# Patient Record
Sex: FEMALE | Race: WHITE | HISPANIC OR LATINO | Employment: UNEMPLOYED | URBAN - METROPOLITAN AREA
[De-identification: names, ages, dates, MRNs, and addresses within clinical notes are randomized per-mention and may not be internally consistent; named-entity substitution may affect disease eponyms.]

---

## 2022-01-16 ENCOUNTER — HOSPITAL ENCOUNTER (EMERGENCY)
Facility: HOSPITAL | Age: 32
Discharge: HOME | End: 2022-01-17
Attending: EMERGENCY MEDICINE
Payer: MEDICAID

## 2022-01-16 DIAGNOSIS — O26.899 ABDOMINAL PAIN AFFECTING PREGNANCY: ICD-10-CM

## 2022-01-16 DIAGNOSIS — R51.9 ACUTE NONINTRACTABLE HEADACHE, UNSPECIFIED HEADACHE TYPE: ICD-10-CM

## 2022-01-16 DIAGNOSIS — R55 SYNCOPE: ICD-10-CM

## 2022-01-16 DIAGNOSIS — R11.0 NAUSEA: ICD-10-CM

## 2022-01-16 DIAGNOSIS — R10.30 LOWER ABDOMINAL PAIN: ICD-10-CM

## 2022-01-16 DIAGNOSIS — R10.9 ABDOMINAL PAIN AFFECTING PREGNANCY: ICD-10-CM

## 2022-01-16 DIAGNOSIS — N30.00 ACUTE CYSTITIS WITHOUT HEMATURIA: Primary | ICD-10-CM

## 2022-01-16 LAB
ALBUMIN SERPL BCP-MCNC: 4.2 G/DL (ref 3.5–5.2)
ALP SERPL-CCNC: 74 U/L (ref 55–135)
ALT SERPL W/O P-5'-P-CCNC: 22 U/L (ref 10–44)
ANION GAP SERPL CALC-SCNC: 12 MMOL/L (ref 8–16)
AST SERPL-CCNC: 21 U/L (ref 10–40)
BACTERIA #/AREA URNS HPF: ABNORMAL /HPF
BASOPHILS # BLD AUTO: 0.06 K/UL (ref 0–0.2)
BASOPHILS NFR BLD: 0.3 % (ref 0–1.9)
BILIRUB SERPL-MCNC: 0.3 MG/DL (ref 0.1–1)
BILIRUB UR QL STRIP: NEGATIVE
BUN SERPL-MCNC: 9 MG/DL (ref 6–20)
CALCIUM SERPL-MCNC: 9.8 MG/DL (ref 8.7–10.5)
CHLORIDE SERPL-SCNC: 105 MMOL/L (ref 95–110)
CLARITY UR: ABNORMAL
CO2 SERPL-SCNC: 19 MMOL/L (ref 23–29)
COLOR UR: YELLOW
CREAT SERPL-MCNC: 0.7 MG/DL (ref 0.5–1.4)
DIFFERENTIAL METHOD: ABNORMAL
EOSINOPHIL # BLD AUTO: 0.1 K/UL (ref 0–0.5)
EOSINOPHIL NFR BLD: 0.7 % (ref 0–8)
ERYTHROCYTE [DISTWIDTH] IN BLOOD BY AUTOMATED COUNT: 14.4 % (ref 11.5–14.5)
EST. GFR  (AFRICAN AMERICAN): >60 ML/MIN/1.73 M^2
EST. GFR  (NON AFRICAN AMERICAN): >60 ML/MIN/1.73 M^2
GLUCOSE SERPL-MCNC: 111 MG/DL (ref 70–110)
GLUCOSE UR QL STRIP: NEGATIVE
HCT VFR BLD AUTO: 35.7 % (ref 37–48.5)
HGB BLD-MCNC: 12.3 G/DL (ref 12–16)
HGB UR QL STRIP: ABNORMAL
HYALINE CASTS #/AREA URNS LPF: 1 /LPF
IMM GRANULOCYTES # BLD AUTO: 0.05 K/UL (ref 0–0.04)
IMM GRANULOCYTES NFR BLD AUTO: 0.3 % (ref 0–0.5)
KETONES UR QL STRIP: NEGATIVE
LEUKOCYTE ESTERASE UR QL STRIP: ABNORMAL
LYMPHOCYTES # BLD AUTO: 2.5 K/UL (ref 1–4.8)
LYMPHOCYTES NFR BLD: 14.2 % (ref 18–48)
MCH RBC QN AUTO: 27 PG (ref 27–31)
MCHC RBC AUTO-ENTMCNC: 34.5 G/DL (ref 32–36)
MCV RBC AUTO: 78 FL (ref 82–98)
MICROSCOPIC COMMENT: ABNORMAL
MONOCYTES # BLD AUTO: 1 K/UL (ref 0.3–1)
MONOCYTES NFR BLD: 5.8 % (ref 4–15)
NEUTROPHILS # BLD AUTO: 13.8 K/UL (ref 1.8–7.7)
NEUTROPHILS NFR BLD: 78.7 % (ref 38–73)
NITRITE UR QL STRIP: NEGATIVE
NRBC BLD-RTO: 0 /100 WBC
PH UR STRIP: 6 [PH] (ref 5–8)
PLATELET # BLD AUTO: 388 K/UL (ref 150–450)
PMV BLD AUTO: 10.9 FL (ref 9.2–12.9)
POTASSIUM SERPL-SCNC: 4.2 MMOL/L (ref 3.5–5.1)
PROT SERPL-MCNC: 7.6 G/DL (ref 6–8.4)
PROT UR QL STRIP: NEGATIVE
RBC # BLD AUTO: 4.56 M/UL (ref 4–5.4)
RBC #/AREA URNS HPF: 24 /HPF (ref 0–4)
SODIUM SERPL-SCNC: 136 MMOL/L (ref 136–145)
SP GR UR STRIP: 1.01 (ref 1–1.03)
SQUAMOUS #/AREA URNS HPF: 20 /HPF
URN SPEC COLLECT METH UR: ABNORMAL
UROBILINOGEN UR STRIP-ACNC: NEGATIVE EU/DL
WBC # BLD AUTO: 17.52 K/UL (ref 3.9–12.7)
WBC #/AREA URNS HPF: 26 /HPF (ref 0–5)

## 2022-01-16 PROCEDURE — 80053 COMPREHEN METABOLIC PANEL: CPT | Performed by: EMERGENCY MEDICINE

## 2022-01-16 PROCEDURE — 96365 THER/PROPH/DIAG IV INF INIT: CPT

## 2022-01-16 PROCEDURE — 86901 BLOOD TYPING SEROLOGIC RH(D): CPT | Performed by: EMERGENCY MEDICINE

## 2022-01-16 PROCEDURE — 85025 COMPLETE CBC W/AUTO DIFF WBC: CPT | Performed by: EMERGENCY MEDICINE

## 2022-01-16 PROCEDURE — 81000 URINALYSIS NONAUTO W/SCOPE: CPT | Performed by: EMERGENCY MEDICINE

## 2022-01-16 PROCEDURE — 99285 EMERGENCY DEPT VISIT HI MDM: CPT | Mod: 25

## 2022-01-16 PROCEDURE — 87186 SC STD MICRODIL/AGAR DIL: CPT | Performed by: EMERGENCY MEDICINE

## 2022-01-16 PROCEDURE — 87077 CULTURE AEROBIC IDENTIFY: CPT | Performed by: EMERGENCY MEDICINE

## 2022-01-16 PROCEDURE — 96361 HYDRATE IV INFUSION ADD-ON: CPT

## 2022-01-16 PROCEDURE — 25000003 PHARM REV CODE 250: Performed by: EMERGENCY MEDICINE

## 2022-01-16 PROCEDURE — 87086 URINE CULTURE/COLONY COUNT: CPT | Performed by: EMERGENCY MEDICINE

## 2022-01-16 PROCEDURE — 63600175 PHARM REV CODE 636 W HCPCS: Performed by: EMERGENCY MEDICINE

## 2022-01-16 PROCEDURE — 84702 CHORIONIC GONADOTROPIN TEST: CPT | Performed by: EMERGENCY MEDICINE

## 2022-01-16 PROCEDURE — 87088 URINE BACTERIA CULTURE: CPT | Performed by: EMERGENCY MEDICINE

## 2022-01-16 RX ORDER — ONDANSETRON 4 MG/1
4 TABLET, ORALLY DISINTEGRATING ORAL EVERY 6 HOURS PRN
Qty: 10 TABLET | Refills: 0 | Status: ON HOLD | OUTPATIENT
Start: 2022-01-16 | End: 2022-09-04 | Stop reason: HOSPADM

## 2022-01-16 RX ORDER — CEPHALEXIN 500 MG/1
500 CAPSULE ORAL EVERY 12 HOURS
Qty: 14 CAPSULE | Refills: 0 | Status: SHIPPED | OUTPATIENT
Start: 2022-01-16 | End: 2022-01-23

## 2022-01-16 RX ADMIN — CEFTRIAXONE SODIUM 1 G: 1 INJECTION, POWDER, FOR SOLUTION INTRAMUSCULAR; INTRAVENOUS at 11:01

## 2022-01-16 RX ADMIN — SODIUM CHLORIDE 1000 ML: 0.9 INJECTION, SOLUTION INTRAVENOUS at 10:01

## 2022-01-17 VITALS
OXYGEN SATURATION: 100 % | HEART RATE: 77 BPM | SYSTOLIC BLOOD PRESSURE: 108 MMHG | TEMPERATURE: 99 F | DIASTOLIC BLOOD PRESSURE: 54 MMHG | WEIGHT: 200 LBS | RESPIRATION RATE: 22 BRPM

## 2022-01-17 LAB
ABO + RH BLD: NORMAL
HCG INTACT+B SERPL-ACNC: NORMAL MIU/ML

## 2022-01-17 NOTE — DISCHARGE INSTRUCTIONS
Asegúrese de beber muchos líquidos, coco Gatorade G2 o Powerade light. Recomiendo christiana Tylenol 650 mg cada 6 a 8 horas según sea necesario para el dolor de fang o el dolor. Asegúrese de christiana shabbir vitaminas prenatales todos los días. Solicite didier eldon de seguimiento con un obstetra y ginecólogo para didier evaluación y manejo adicionales. Regrese con cualquier síntoma nuevo o que empeore.

## 2022-01-17 NOTE — ED TRIAGE NOTES
used for assessment. Patient arrived to ED with complaints of dizziness and abdominal cramping and low back pain after a syncopal episode. + LOC, denies HH. States room is spinning. States she was upset about something, was sitting on a couch when she became dizzy and blacked out. LMP 21. . Awake, alert, oriented x 4, Denies CP and SOB. No increased WOB. Ambulatory with steady gait.  Family at bedside.

## 2022-01-17 NOTE — ED PROVIDER NOTES
Encounter Date: 2022       History     Chief Complaint   Patient presents with    Loss of Consciousness     Pt arrives via  EMS with c/o a syncopal episode witnessed by family, pt struck her head on wall, no injury noted. Pt has hx of diabetes, EMS reports . Pt is awake, alert at this time.      31-year-old female  at approximately 8 weeks gestational age by dates brought to the emergency department by EMS after a syncopal episode.  Patient states she had gotten upset and then stood up abruptly and felt very lightheaded, with brief loss of consciousness and a fall.  Denies striking her head.  On arrival states she no longer feels lightheaded.  Notes for the past few weeks she has had some nausea without emesis, particularly the morning, and has been having headaches.  Denies any dysuria, frequency, urgency, vaginal bleeding or discharge.  States she has has been having some cramping lower abdominal and low back pain last few days intermittently, lasting minutes at a time without eliciting, exacerbating, or alleviating factors.  States she missed an OB appointment last week for this pregnancy.  Describes generalized, throbbing headache that is worst in the morning, with some improvement with over-the-counter medications.  No other symptoms reported.  Denies any focal numbness or weakness, sore throat, cough, congestion, chest pain, shortness of breath.         Review of patient's allergies indicates:  No Known Allergies  History reviewed. No pertinent past medical history.  No past surgical history on file.  History reviewed. No pertinent family history.     Review of Systems   Constitutional: Negative for chills, fatigue and fever.   HENT: Negative for congestion and sore throat.    Eyes: Negative for photophobia and visual disturbance.   Respiratory: Negative for cough and shortness of breath.    Cardiovascular: Negative for chest pain and palpitations.   Gastrointestinal: Positive for abdominal  pain and nausea. Negative for vomiting.   Musculoskeletal: Positive for back pain. Negative for neck pain and neck stiffness.   Neurological: Positive for syncope, light-headedness and headaches. Negative for numbness.       Physical Exam     Initial Vitals [01/16/22 2138]   BP Pulse Resp Temp SpO2   126/75 78 19 98.7 °F (37.1 °C) 99 %      MAP       --         Physical Exam    Nursing note and vitals reviewed.  Constitutional: She appears well-developed and well-nourished. No distress.   HENT:   Head: Normocephalic and atraumatic.   Eyes: Conjunctivae and EOM are normal. Pupils are equal, round, and reactive to light.   Neck: Neck supple. No tracheal deviation present.   Normal range of motion.  Cardiovascular: Normal rate and intact distal pulses.   Pulmonary/Chest: No respiratory distress.   Abdominal: Abdomen is soft. She exhibits no distension. There is no abdominal tenderness.   Musculoskeletal:         General: No tenderness or edema. Normal range of motion.      Cervical back: Normal range of motion and neck supple.     Neurological: She is alert and oriented to person, place, and time. She has normal strength. No cranial nerve deficit. GCS score is 15. GCS eye subscore is 4. GCS verbal subscore is 5. GCS motor subscore is 6.   Skin: Skin is warm and dry.         ED Course   Procedures  Labs Reviewed   CBC W/ AUTO DIFFERENTIAL - Abnormal; Notable for the following components:       Result Value    WBC 17.52 (*)     Hematocrit 35.7 (*)     MCV 78 (*)     Gran # (ANC) 13.8 (*)     Immature Grans (Abs) 0.05 (*)     Gran % 78.7 (*)     Lymph % 14.2 (*)     All other components within normal limits    Narrative:     Release to patient->Immediate   COMPREHENSIVE METABOLIC PANEL - Abnormal; Notable for the following components:    CO2 19 (*)     Glucose 111 (*)     All other components within normal limits    Narrative:     Release to patient->Immediate   URINALYSIS - Abnormal; Notable for the following components:     Appearance, UA Hazy (*)     Occult Blood UA Trace (*)     Leukocytes, UA 3+ (*)     All other components within normal limits   URINALYSIS MICROSCOPIC - Abnormal; Notable for the following components:    RBC, UA 24 (*)     WBC, UA 26 (*)     Bacteria Many (*)     All other components within normal limits   CULTURE, URINE   CULTURE, URINE   HCG, QUANTITATIVE    Narrative:     Release to patient->Immediate   GROUP & RH     EKG Readings: (Independently Interpreted)   Initial Reading: No STEMI. Previous EKG Date: No prior available for comparison. Rhythm: Normal Sinus Rhythm. Heart Rate: 77. Ectopy: No Ectopy. Conduction: Normal. ST Segments: Normal ST Segments. T Waves: Normal. Axis: Normal.         X-Rays:   Independently Interpreted Readings:   Other Readings:  Imaging interpreted by radiologist and visualized by me:    Imaging Results          US OB <14 Wks TransAbd & TransVag, Single Gestation (XPD) (Final result)  Result time 01/16/22 23:40:54   Procedure changed from US OB <14 Wks, TransAbd, Single Gestation     Final result by Tim Calles MD (01/16/22 23:40:54)                 Impression:      Single live intrauterine pregnancy with estimated gestational age 6 weeks 2 days. and an AICHA of 09/09/2022 (by [facility]).      Electronically signed by: Tim Calles  Date:    01/16/2022  Time:    23:40             Narrative:    EXAMINATION:  US OB <14 WEEKS, TRANSABDOM & TRANSVAG, SINGLE GESTATION (XPD)    CLINICAL HISTORY:  abd pain; Other specified pregnancy related conditions, unspecified trimester    TECHNIQUE:  Transabdominal sonography of the pelvis was performed, followed by transvaginal sonography to better evaluate the uterus and ovaries.    COMPARISON:  None.    FINDINGS:  Uterus: Measures 10.7 x 5.8 x 6.2 cm    Intrauterine gestation(s): Single    Mean gestational sac diameter: 1.5 cm    Yolk sac: Identified, measuring 0.1 cm    Crown-rump length (CRL): 0.46 cm    Cardiac activity: 167 bpm.  Note that  evaluation of fetal heart rate is limited due to small size of the fetal pole and patient respiration.    Subchorionic hemorrhage: None.    Right ovary: Measures 3.3 x 1.2 x 3.1 cm.  Anechoic area measuring 1.4 x 0.8 x 1.4 cm compatible with cyst.    Left ovary: Measures 3.5 x 3 x 2 cm and is unremarkable in appearance.    Miscellaneous: No Free Fluid.                                Medications   cefTRIAXone (ROCEPHIN) 1 g/50 mL D5W IVPB (1 g Intravenous New Bag 22 6440)   sodium chloride 0.9% bolus 1,000 mL (1,000 mLs Intravenous New Bag 22 6356)     Medical Decision Making:   Initial Assessment:   31-year-old female  brought to the emergency department by EMS after a syncopal episode also complaining of headache and abdominal pain and nausea  Differential Diagnosis:   Dehydration, electrolyte dyscrasias, arrhythmia, anemia, vasovagal, migraine versus tension versus cluster versus sinus headache, diverticulitis, cholecystitis, pancreatitis, appendicitis, obstruction, constipation UTI, pyelonephritis, kidney stone, gastroenteritis, pregnancy, threatened versus inevitable versus missed miscarriage, ectopic  Independently Interpreted Test(s):   I have ordered and independently interpreted X-rays - see prior notes.  I have ordered and independently interpreted EKG Reading(s) - see prior notes  Clinical Tests:   Lab Tests: Reviewed       <> Summary of Lab: Concerning for UTI  ED Management:  Patient given IV fluid, Rocephin.  On re-evaluation, vital signs remained stable, patient reports improvement in symptoms, denies any headache or nausea at this time.  Denies any abdominal pain at this time.  Informed her of results as well as plan to discharge with prescriptions for Zofran and Keflex, referral sent for OBGYN follow-up, instructed on home management, instructed to make sure she is taking her prenatal vitamins daily, given strict return precautions.  Patient and family expressed good understanding and  are comfortable with discharge at this time.                       Clinical Impression:   Final diagnoses:  [R55] Syncope  [O26.899, R10.9] Abdominal pain affecting pregnancy  [N30.00] Acute cystitis without hematuria (Primary)  [R10.30] Lower abdominal pain  [R11.0] Nausea  [R51.9] Acute nonintractable headache, unspecified headache type          ED Disposition Condition    Discharge Stable        ED Prescriptions     Medication Sig Dispense Start Date End Date Auth. Provider    ondansetron (ZOFRAN-ODT) 4 MG TbDL Take 1 tablet (4 mg total) by mouth every 6 (six) hours as needed (Nausea). 10 tablet 1/16/2022  Leobardo Byrd MD    cephALEXin (KEFLEX) 500 MG capsule Take 1 capsule (500 mg total) by mouth every 12 (twelve) hours. for 7 days 14 capsule 1/16/2022 1/23/2022 Leobardo Byrd MD        Follow-up Information     Follow up With Specialties Details Why Contact Info    Barber Moya MD Obstetrics and Gynecology Schedule an appointment as soon as possible for a visit   [address] [facility]  [address] [place] [id number]  [phone]             Leobardo Byrd MD  01/17/22 0006

## 2022-01-18 ENCOUNTER — TELEPHONE (OUTPATIENT)
Dept: OBSTETRICS AND GYNECOLOGY | Facility: CLINIC | Age: 32
End: 2022-01-18
Payer: MEDICAID

## 2022-01-18 NOTE — TELEPHONE ENCOUNTER
----- Message from Bibi Alcala sent at 1/18/2022  2:00 PM CST -----  Contact: Erika [phone]  Type:  Sooner Appointment Request    Caller is requesting a sooner appointment.      Name of Caller: Erika   When is the first available appointment? 3/16  Symptoms: Abdominal pain affecting pregnancy [O26.899, R10.9]  Would the patient rather a call back or a response via [facility]?  Call back   Best Call Back Number: [phone]  Additional Information:  pt has referral marked urgent     Khmer speaking patient

## 2022-01-19 LAB — BACTERIA UR CULT: ABNORMAL

## 2022-02-07 ENCOUNTER — HOSPITAL ENCOUNTER (EMERGENCY)
Facility: HOSPITAL | Age: 32
Discharge: HOME | End: 2022-02-07
Attending: EMERGENCY MEDICINE
Payer: MEDICAID

## 2022-02-07 VITALS
HEIGHT: 63 IN | OXYGEN SATURATION: 98 % | TEMPERATURE: 98 F | SYSTOLIC BLOOD PRESSURE: 117 MMHG | HEART RATE: 83 BPM | DIASTOLIC BLOOD PRESSURE: 62 MMHG | BODY MASS INDEX: 35.43 KG/M2 | RESPIRATION RATE: 16 BRPM

## 2022-02-07 DIAGNOSIS — O20.0 THREATENED ABORTION: Primary | ICD-10-CM

## 2022-02-07 DIAGNOSIS — N93.9 VAGINAL BLEEDING: ICD-10-CM

## 2022-02-07 DIAGNOSIS — N30.90 CYSTITIS: ICD-10-CM

## 2022-02-07 LAB
ABO + RH BLD: NORMAL
ALBUMIN SERPL BCP-MCNC: 4.1 G/DL (ref 3.5–5.2)
ALP SERPL-CCNC: 70 U/L (ref 55–135)
ALT SERPL W/O P-5'-P-CCNC: 26 U/L (ref 10–44)
ANION GAP SERPL CALC-SCNC: 11 MMOL/L (ref 8–16)
AST SERPL-CCNC: 17 U/L (ref 10–40)
B-HCG UR QL: POSITIVE
BACTERIA #/AREA URNS HPF: ABNORMAL /HPF
BASOPHILS # BLD AUTO: 0.06 K/UL (ref 0–0.2)
BASOPHILS NFR BLD: 0.4 % (ref 0–1.9)
BILIRUB SERPL-MCNC: 0.3 MG/DL (ref 0.1–1)
BILIRUB UR QL STRIP: NEGATIVE
BUN SERPL-MCNC: 8 MG/DL (ref 6–20)
CALCIUM SERPL-MCNC: 9.5 MG/DL (ref 8.7–10.5)
CHLORIDE SERPL-SCNC: 104 MMOL/L (ref 95–110)
CLARITY UR: ABNORMAL
CO2 SERPL-SCNC: 19 MMOL/L (ref 23–29)
COLOR UR: YELLOW
CREAT SERPL-MCNC: 0.8 MG/DL (ref 0.5–1.4)
CTP QC/QA: YES
DIFFERENTIAL METHOD: ABNORMAL
EOSINOPHIL # BLD AUTO: 0.1 K/UL (ref 0–0.5)
EOSINOPHIL NFR BLD: 0.7 % (ref 0–8)
ERYTHROCYTE [DISTWIDTH] IN BLOOD BY AUTOMATED COUNT: 14 % (ref 11.5–14.5)
EST. GFR  (AFRICAN AMERICAN): >60 ML/MIN/1.73 M^2
EST. GFR  (NON AFRICAN AMERICAN): >60 ML/MIN/1.73 M^2
GLUCOSE SERPL-MCNC: 97 MG/DL (ref 70–110)
GLUCOSE UR QL STRIP: NEGATIVE
HCG INTACT+B SERPL-ACNC: NORMAL MIU/ML
HCT VFR BLD AUTO: 37.4 % (ref 37–48.5)
HGB BLD-MCNC: 13.1 G/DL (ref 12–16)
HGB UR QL STRIP: ABNORMAL
IMM GRANULOCYTES # BLD AUTO: 0.04 K/UL (ref 0–0.04)
IMM GRANULOCYTES NFR BLD AUTO: 0.2 % (ref 0–0.5)
KETONES UR QL STRIP: NEGATIVE
LEUKOCYTE ESTERASE UR QL STRIP: ABNORMAL
LYMPHOCYTES # BLD AUTO: 2.9 K/UL (ref 1–4.8)
LYMPHOCYTES NFR BLD: 17.7 % (ref 18–48)
MCH RBC QN AUTO: 27.3 PG (ref 27–31)
MCHC RBC AUTO-ENTMCNC: 35 G/DL (ref 32–36)
MCV RBC AUTO: 78 FL (ref 82–98)
MICROSCOPIC COMMENT: ABNORMAL
MONOCYTES # BLD AUTO: 0.9 K/UL (ref 0.3–1)
MONOCYTES NFR BLD: 5.4 % (ref 4–15)
NEUTROPHILS # BLD AUTO: 12.4 K/UL (ref 1.8–7.7)
NEUTROPHILS NFR BLD: 75.6 % (ref 38–73)
NITRITE UR QL STRIP: NEGATIVE
NRBC BLD-RTO: 0 /100 WBC
PH UR STRIP: 7 [PH] (ref 5–8)
PLATELET # BLD AUTO: 374 K/UL (ref 150–450)
PMV BLD AUTO: 10.3 FL (ref 9.2–12.9)
POTASSIUM SERPL-SCNC: 3.6 MMOL/L (ref 3.5–5.1)
PROT SERPL-MCNC: 7.7 G/DL (ref 6–8.4)
PROT UR QL STRIP: NEGATIVE
RBC # BLD AUTO: 4.79 M/UL (ref 4–5.4)
RBC #/AREA URNS HPF: 3 /HPF (ref 0–4)
SODIUM SERPL-SCNC: 134 MMOL/L (ref 136–145)
SP GR UR STRIP: 1.01 (ref 1–1.03)
SQUAMOUS #/AREA URNS HPF: 19 /HPF
URN SPEC COLLECT METH UR: ABNORMAL
UROBILINOGEN UR STRIP-ACNC: NEGATIVE EU/DL
WBC # BLD AUTO: 16.41 K/UL (ref 3.9–12.7)
WBC #/AREA URNS HPF: 9 /HPF (ref 0–5)

## 2022-02-07 PROCEDURE — 80053 COMPREHEN METABOLIC PANEL: CPT | Performed by: NURSE PRACTITIONER

## 2022-02-07 PROCEDURE — 85025 COMPLETE CBC W/AUTO DIFF WBC: CPT | Performed by: NURSE PRACTITIONER

## 2022-02-07 PROCEDURE — 86901 BLOOD TYPING SEROLOGIC RH(D): CPT | Performed by: NURSE PRACTITIONER

## 2022-02-07 PROCEDURE — 81025 URINE PREGNANCY TEST: CPT | Performed by: NURSE PRACTITIONER

## 2022-02-07 PROCEDURE — 84702 CHORIONIC GONADOTROPIN TEST: CPT | Performed by: NURSE PRACTITIONER

## 2022-02-07 PROCEDURE — 99284 EMERGENCY DEPT VISIT MOD MDM: CPT | Mod: 25

## 2022-02-07 PROCEDURE — 81000 URINALYSIS NONAUTO W/SCOPE: CPT | Performed by: NURSE PRACTITIONER

## 2022-02-07 PROCEDURE — 25000003 PHARM REV CODE 250: Performed by: EMERGENCY MEDICINE

## 2022-02-07 RX ORDER — ONDANSETRON 4 MG/1
4 TABLET, ORALLY DISINTEGRATING ORAL EVERY 8 HOURS PRN
Qty: 12 TABLET | Refills: 0 | Status: ON HOLD | OUTPATIENT
Start: 2022-02-07 | End: 2022-09-04 | Stop reason: HOSPADM

## 2022-02-07 RX ORDER — CEPHALEXIN 250 MG/1
500 CAPSULE ORAL EVERY 12 HOURS
Qty: 28 CAPSULE | Refills: 0 | Status: SHIPPED | OUTPATIENT
Start: 2022-02-07 | End: 2022-02-14

## 2022-02-07 RX ORDER — ACETAMINOPHEN 500 MG
500 TABLET ORAL
Status: COMPLETED | OUTPATIENT
Start: 2022-02-07 | End: 2022-02-07

## 2022-02-07 RX ADMIN — ACETAMINOPHEN 500 MG: 500 TABLET ORAL at 09:02

## 2022-02-08 NOTE — ED PROVIDER NOTES
Encounter Date: 2022       History     Chief Complaint   Patient presents with    Vaginal Bleeding     Pt states she is 9 weeks  6 days mths pregnant, last menstrual cycle was now 30th, pt complains of lower abd pain and light vaginal bleeding, G2      Erika Vasquez is a 31 y.o. female who  has no past medical history on file.    The patient presents to the ED due to vaginal bleeding onset this afternoon.  Patient is 9 weeks 6 days pregnant.  She is   2 para 1. She is pending follow-up with her OBGYN in 2 days Dr. Moya.  She states that with her vaginal bleeding today she had some cramping lower abdominal pain.  She reported light vaginal bleeding.  After her ultrasound she noticed a little bit more bleeding however is not soaking through pads.  She has no other symptoms today including shortness of breath, dishcarge, fever fatigue no vomiting today but she does report nausea.  She also reports some dysuria.  She was seen almost a month ago and was found to have UTI that was pansensitive on urine culture.  No other concerns.    The history is provided by the patient ( offered at no cost and declined.).     Review of patient's allergies indicates:  No Known Allergies  No past medical history on file.  No past surgical history on file.  No family history on file.     Review of Systems   Constitutional: Negative for chills and fever.   HENT: Negative for sore throat.    Respiratory: Negative for cough and shortness of breath.    Cardiovascular: Negative for chest pain.   Gastrointestinal: Positive for nausea. Negative for vomiting.   Genitourinary: Positive for dysuria, pelvic pain and vaginal bleeding. Negative for frequency, urgency and vaginal discharge.   Musculoskeletal: Negative for back pain, neck pain and neck stiffness.   Skin: Negative for rash and wound.   Neurological: Negative for syncope and weakness.   Hematological: Does not bruise/bleed easily.    Psychiatric/Behavioral: Negative for agitation, behavioral problems and confusion.       Physical Exam     Initial Vitals [02/07/22 1656]   BP Pulse Resp Temp SpO2   133/70 89 20 98.5 °F (36.9 °C) 100 %      MAP       --         Physical Exam    Nursing note and vitals reviewed.  Constitutional: She appears well-developed and well-nourished. She is not diaphoretic. No distress.   HENT:   Head: Normocephalic and atraumatic.   Mouth/Throat: Oropharynx is clear and moist.   Eyes: EOM are normal. Pupils are equal, round, and reactive to light.   Neck: No tracheal deviation present.   Cardiovascular: Normal rate, regular rhythm, normal heart sounds and intact distal pulses.   Pulmonary/Chest: Breath sounds normal. No stridor. No respiratory distress. She has no wheezes.   Abdominal: Abdomen is soft. Bowel sounds are normal. She exhibits no distension and no mass. There is no abdominal tenderness.   Genitourinary:    Genitourinary Comments: Patient declined pelvic exam     Musculoskeletal:         General: No edema. Normal range of motion.     Neurological: She is alert and oriented to person, place, and time. No cranial nerve deficit or sensory deficit.   Skin: Skin is warm and dry. Capillary refill takes less than 2 seconds. No rash noted.   Psychiatric: She has a normal mood and affect. Her behavior is normal. Thought content normal.         ED Course   Procedures  Labs Reviewed   CBC W/ AUTO DIFFERENTIAL - Abnormal; Notable for the following components:       Result Value    WBC 16.41 (*)     MCV 78 (*)     Gran # (ANC) 12.4 (*)     Gran % 75.6 (*)     Lymph % 17.7 (*)     All other components within normal limits   COMPREHENSIVE METABOLIC PANEL - Abnormal; Notable for the following components:    Sodium 134 (*)     CO2 19 (*)     All other components within normal limits   URINALYSIS, REFLEX TO URINE CULTURE - Abnormal; Notable for the following components:    Appearance, UA Hazy (*)     Occult Blood UA 3+ (*)      Leukocytes, UA 2+ (*)     All other components within normal limits    Narrative:     Specimen Source->Urine   URINALYSIS MICROSCOPIC - Abnormal; Notable for the following components:    WBC, UA 9 (*)     All other components within normal limits    Narrative:     Specimen Source->Urine   POCT URINE PREGNANCY - Abnormal; Notable for the following components:    POC Preg Test, Ur Positive (*)     All other components within normal limits   HCG, QUANTITATIVE   HCG, QUANTITATIVE   GROUP & RH          Imaging Results          US OB <14 Wks TransAbd & TransVag, Single Gestation (XPD) (Final result)  Result time 02/07/22 19:43:10   Procedure changed from US OB <14 Wks, TransAbd, Single Gestation     Final result by Aris Cortes MD (02/07/22 19:43:10)                 Impression:      Single living intrauterine gestation, crown-rump length correlates with an estimated gestational age of 9 weeks 3 days, cardiac activity documented at 179 beats per minute.  There is a small amount of fluid in the cervical canal, correlation is advised.      Electronically signed by: Aris Cortes MD  Date:    02/07/2022  Time:    19:43             Narrative:    EXAMINATION:  US OB <14 WEEKS, TRANSABDOM & TRANSVAG, SINGLE GESTATION (XPD)    CLINICAL HISTORY:  VB; Abnormal uterine and vaginal bleeding, unspecified    TECHNIQUE:  Transabdominal sonography of the pelvis was performed, followed by transvaginal sonography to better evaluate the uterus and ovaries.    COMPARISON:  01/16/2022    FINDINGS:  There is a single living intrauterine gestation, crown-rump length correlates with an estimated gestational age of 9 weeks 3 days, cardiac activity documented at 179 beats per minute.  There are cervical nabothian cysts and a mild amount of fluid in the cervical canal.    The ovaries are not identified.  No significant free fluid in the pelvis.                                 Medications   acetaminophen tablet 500 mg (500 mg Oral Given  2/7/22 2129)     Medical Decision Making:   Initial Assessment:   31-year-old female presenting with vaginal bleeding.  Vital signs are stable.  Will plan to obtain labs ultrasound and will reassess.  Differential Diagnosis:   Differential Diagnosis includes, but is not limited to:  Pregnancy complication (threatened AB, inevitable AB, incomplete Ab, missed AB, ectopic pregnancy, placenta previa) normal menses, STD, foreign body, trauma.    ED Management:  Labs with leukocytosis,  UA consistent with cystitis, will treat with antibiotics.  TVUS shows IUP with fetal heart tones, fluid in cervical canal - patient declined pelvic exam, she has follow up with her OB in 2 days  Requested refill of zofran  NAD on reassessment. She is stable for discharge and ready to go home.   Discussed return precautions for worsening bleeding, pain, new symptoms such as fever or any other concerns.    After taking into careful account the historical factors and physical exam findings of the patient's presentation today, in conjunction with the empirical and objective data obtained on ED workup, no acute emergent medical condition has been identified. The patient appears to be low risk for an emergent medical condition and I feel it is safe and appropriate at this time for the patient to be discharged to follow-up as detailed in their discharge instructions for reevaluation and possible continued outpatient workup and management. I have discussed the specifics of the workup with the patient and the patient has verbalized understanding of the details of the workup, the diagnosis, the treatment plan, and the need for outpatient follow-up.  Although the patient has no emergent etiology today this does not preclude the development of an emergent condition so in addition, I have advised the patient that they can return to the ED and/or activate EMS at any time with worsening of their symptoms, change of their symptoms, or with any other  medical complaint.  The patient remained comfortable and stable during their visit in the ED.  Discharge and follow-up instructions discussed with the patient who expressed understanding and willingness to comply with my recommendations.                        Clinical Impression:   Final diagnoses:  [N93.9] Vaginal bleeding  [O20.0] Threatened  (Primary)  [N30.90] Cystitis          ED Disposition Condition    Discharge Stable        ED Prescriptions     Medication Sig Dispense Start Date End Date Auth. Provider    cephALEXin (KEFLEX) 250 MG capsule Take 2 capsules (500 mg total) by mouth every 12 (twelve) hours. for 7 days 28 capsule 2022 Bari Toussaint Jr., MD    ondansetron (ZOFRAN-ODT) 4 MG TbDL Take 1 tablet (4 mg total) by mouth every 8 (eight) hours as needed (nausea). 12 tablet 2022  Bari Toussaint Jr., MD        Follow-up Information     Follow up With Specialties Details Why Contact Info    Barber Moya MD Obstetrics and Gynecology In 2 days  [address] [facility]  [address] [place] [id number] [phone]            Portions of this note were dictated using voice recognition software and may contain dictation related errors in spelling/grammar/syntax not found on text review       Bari Toussaint Jr., MD  22 [id number]

## 2022-02-10 ENCOUNTER — LAB VISIT (OUTPATIENT)
Dept: LAB | Facility: HOSPITAL | Age: 32
End: 2022-02-10
Attending: OBSTETRICS & GYNECOLOGY
Payer: MEDICAID

## 2022-02-10 ENCOUNTER — OFFICE VISIT (OUTPATIENT)
Dept: OBSTETRICS AND GYNECOLOGY | Facility: CLINIC | Age: 32
End: 2022-02-10
Payer: MEDICAID

## 2022-02-10 VITALS
WEIGHT: 228.81 LBS | SYSTOLIC BLOOD PRESSURE: 122 MMHG | BODY MASS INDEX: 40.54 KG/M2 | DIASTOLIC BLOOD PRESSURE: 70 MMHG

## 2022-02-10 DIAGNOSIS — O09.299 HX OF PREECLAMPSIA, PRIOR PREGNANCY, CURRENTLY PREGNANT: ICD-10-CM

## 2022-02-10 DIAGNOSIS — O26.899 ABDOMINAL PAIN AFFECTING PREGNANCY: ICD-10-CM

## 2022-02-10 DIAGNOSIS — Z98.891 PREVIOUS CESAREAN SECTION: ICD-10-CM

## 2022-02-10 DIAGNOSIS — N91.2 AMENORRHEA: Primary | ICD-10-CM

## 2022-02-10 DIAGNOSIS — R10.9 ABDOMINAL PAIN AFFECTING PREGNANCY: ICD-10-CM

## 2022-02-10 DIAGNOSIS — N91.2 AMENORRHEA: ICD-10-CM

## 2022-02-10 LAB
ABO + RH BLD: NORMAL
ANION GAP SERPL CALC-SCNC: 10 MMOL/L (ref 8–16)
BASOPHILS # BLD AUTO: 0.05 K/UL (ref 0–0.2)
BASOPHILS NFR BLD: 0.4 % (ref 0–1.9)
BLD GP AB SCN CELLS X3 SERPL QL: NORMAL
BUN SERPL-MCNC: 6 MG/DL (ref 6–20)
CALCIUM SERPL-MCNC: 9.5 MG/DL (ref 8.7–10.5)
CHLORIDE SERPL-SCNC: 104 MMOL/L (ref 95–110)
CO2 SERPL-SCNC: 21 MMOL/L (ref 23–29)
CREAT SERPL-MCNC: 0.7 MG/DL (ref 0.5–1.4)
DIFFERENTIAL METHOD: ABNORMAL
EOSINOPHIL # BLD AUTO: 0.1 K/UL (ref 0–0.5)
EOSINOPHIL NFR BLD: 1.1 % (ref 0–8)
ERYTHROCYTE [DISTWIDTH] IN BLOOD BY AUTOMATED COUNT: 13.8 % (ref 11.5–14.5)
EST. GFR  (AFRICAN AMERICAN): >60 ML/MIN/1.73 M^2
EST. GFR  (NON AFRICAN AMERICAN): >60 ML/MIN/1.73 M^2
ESTIMATED AVG GLUCOSE: 108 MG/DL (ref 68–131)
GLUCOSE SERPL-MCNC: 100 MG/DL (ref 70–110)
HBA1C MFR BLD: 5.4 % (ref 4–5.6)
HCT VFR BLD AUTO: 37.1 % (ref 37–48.5)
HGB BLD-MCNC: 12.8 G/DL (ref 12–16)
IMM GRANULOCYTES # BLD AUTO: 0.06 K/UL (ref 0–0.04)
IMM GRANULOCYTES NFR BLD AUTO: 0.5 % (ref 0–0.5)
LYMPHOCYTES # BLD AUTO: 2.5 K/UL (ref 1–4.8)
LYMPHOCYTES NFR BLD: 20.2 % (ref 18–48)
MCH RBC QN AUTO: 27.1 PG (ref 27–31)
MCHC RBC AUTO-ENTMCNC: 34.5 G/DL (ref 32–36)
MCV RBC AUTO: 79 FL (ref 82–98)
MONOCYTES # BLD AUTO: 0.7 K/UL (ref 0.3–1)
MONOCYTES NFR BLD: 5.3 % (ref 4–15)
NEUTROPHILS # BLD AUTO: 8.9 K/UL (ref 1.8–7.7)
NEUTROPHILS NFR BLD: 72.5 % (ref 38–73)
NRBC BLD-RTO: 0 /100 WBC
PLATELET # BLD AUTO: 391 K/UL (ref 150–450)
PMV BLD AUTO: 10.6 FL (ref 9.2–12.9)
POTASSIUM SERPL-SCNC: 3.6 MMOL/L (ref 3.5–5.1)
RBC # BLD AUTO: 4.72 M/UL (ref 4–5.4)
SODIUM SERPL-SCNC: 135 MMOL/L (ref 136–145)
WBC # BLD AUTO: 12.31 K/UL (ref 3.9–12.7)

## 2022-02-10 PROCEDURE — 87086 URINE CULTURE/COLONY COUNT: CPT | Performed by: OBSTETRICS & GYNECOLOGY

## 2022-02-10 PROCEDURE — 87624 HPV HI-RISK TYP POOLED RSLT: CPT | Mod: 91 | Performed by: OBSTETRICS & GYNECOLOGY

## 2022-02-10 PROCEDURE — 99204 PR OFFICE/OUTPT VISIT, NEW, LEVL IV, 45-59 MIN: ICD-10-PCS | Mod: S$PBB,TH,, | Performed by: OBSTETRICS & GYNECOLOGY

## 2022-02-10 PROCEDURE — 87491 CHLMYD TRACH DNA AMP PROBE: CPT | Mod: 59 | Performed by: OBSTETRICS & GYNECOLOGY

## 2022-02-10 PROCEDURE — 36415 COLL VENOUS BLD VENIPUNCTURE: CPT | Performed by: OBSTETRICS & GYNECOLOGY

## 2022-02-10 PROCEDURE — 3074F PR MOST RECENT SYSTOLIC BLOOD PRESSURE < 130 MM HG: ICD-10-PCS | Mod: CPTII,,, | Performed by: OBSTETRICS & GYNECOLOGY

## 2022-02-10 PROCEDURE — 3074F SYST BP LT 130 MM HG: CPT | Mod: CPTII,,, | Performed by: OBSTETRICS & GYNECOLOGY

## 2022-02-10 PROCEDURE — 83036 HEMOGLOBIN GLYCOSYLATED A1C: CPT | Performed by: OBSTETRICS & GYNECOLOGY

## 2022-02-10 PROCEDURE — 99999 PR PBB SHADOW E&M-EST. PATIENT-LVL III: ICD-10-PCS | Mod: PBBFAC,,, | Performed by: OBSTETRICS & GYNECOLOGY

## 2022-02-10 PROCEDURE — 87389 HIV-1 AG W/HIV-1&-2 AB AG IA: CPT | Performed by: OBSTETRICS & GYNECOLOGY

## 2022-02-10 PROCEDURE — 99213 OFFICE O/P EST LOW 20 MIN: CPT | Mod: PBBFAC,TH,PO | Performed by: OBSTETRICS & GYNECOLOGY

## 2022-02-10 PROCEDURE — 85025 COMPLETE CBC W/AUTO DIFF WBC: CPT | Performed by: OBSTETRICS & GYNECOLOGY

## 2022-02-10 PROCEDURE — 80074 ACUTE HEPATITIS PANEL: CPT | Performed by: OBSTETRICS & GYNECOLOGY

## 2022-02-10 PROCEDURE — 3008F BODY MASS INDEX DOCD: CPT | Mod: CPTII,,, | Performed by: OBSTETRICS & GYNECOLOGY

## 2022-02-10 PROCEDURE — 1160F RVW MEDS BY RX/DR IN RCRD: CPT | Mod: CPTII,,, | Performed by: OBSTETRICS & GYNECOLOGY

## 2022-02-10 PROCEDURE — 86901 BLOOD TYPING SEROLOGIC RH(D): CPT | Performed by: OBSTETRICS & GYNECOLOGY

## 2022-02-10 PROCEDURE — 81220 CFTR GENE COM VARIANTS: CPT | Performed by: OBSTETRICS & GYNECOLOGY

## 2022-02-10 PROCEDURE — 87624 HPV HI-RISK TYP POOLED RSLT: CPT | Performed by: OBSTETRICS & GYNECOLOGY

## 2022-02-10 PROCEDURE — 3078F DIAST BP <80 MM HG: CPT | Mod: CPTII,,, | Performed by: OBSTETRICS & GYNECOLOGY

## 2022-02-10 PROCEDURE — 88175 CYTOPATH C/V AUTO FLUID REDO: CPT | Performed by: OBSTETRICS & GYNECOLOGY

## 2022-02-10 PROCEDURE — 83020 HEMOGLOBIN ELECTROPHORESIS: CPT | Performed by: OBSTETRICS & GYNECOLOGY

## 2022-02-10 PROCEDURE — 87591 N.GONORRHOEAE DNA AMP PROB: CPT | Mod: 59 | Performed by: OBSTETRICS & GYNECOLOGY

## 2022-02-10 PROCEDURE — 99999 PR PBB SHADOW E&M-EST. PATIENT-LVL III: CPT | Mod: PBBFAC,,, | Performed by: OBSTETRICS & GYNECOLOGY

## 2022-02-10 PROCEDURE — 3008F PR BODY MASS INDEX (BMI) DOCUMENTED: ICD-10-PCS | Mod: CPTII,,, | Performed by: OBSTETRICS & GYNECOLOGY

## 2022-02-10 PROCEDURE — 87801 DETECT AGNT MULT DNA AMPLI: CPT | Performed by: OBSTETRICS & GYNECOLOGY

## 2022-02-10 PROCEDURE — 80048 BASIC METABOLIC PNL TOTAL CA: CPT | Performed by: OBSTETRICS & GYNECOLOGY

## 2022-02-10 PROCEDURE — 1159F MED LIST DOCD IN RCRD: CPT | Mod: CPTII,,, | Performed by: OBSTETRICS & GYNECOLOGY

## 2022-02-10 PROCEDURE — 86592 SYPHILIS TEST NON-TREP QUAL: CPT | Performed by: OBSTETRICS & GYNECOLOGY

## 2022-02-10 PROCEDURE — 99204 OFFICE O/P NEW MOD 45 MIN: CPT | Mod: S$PBB,TH,, | Performed by: OBSTETRICS & GYNECOLOGY

## 2022-02-10 PROCEDURE — 83020 HEMOGLOBIN ELECTROPHORESIS: CPT | Mod: 91 | Performed by: OBSTETRICS & GYNECOLOGY

## 2022-02-10 PROCEDURE — 87625 HPV TYPES 16 & 18 ONLY: CPT | Performed by: OBSTETRICS & GYNECOLOGY

## 2022-02-10 PROCEDURE — 3078F PR MOST RECENT DIASTOLIC BLOOD PRESSURE < 80 MM HG: ICD-10-PCS | Mod: CPTII,,, | Performed by: OBSTETRICS & GYNECOLOGY

## 2022-02-10 PROCEDURE — 87481 CANDIDA DNA AMP PROBE: CPT | Mod: 59 | Performed by: OBSTETRICS & GYNECOLOGY

## 2022-02-10 PROCEDURE — 86900 BLOOD TYPING SEROLOGIC ABO: CPT | Performed by: OBSTETRICS & GYNECOLOGY

## 2022-02-10 PROCEDURE — 86762 RUBELLA ANTIBODY: CPT | Performed by: OBSTETRICS & GYNECOLOGY

## 2022-02-10 PROCEDURE — 1159F PR MEDICATION LIST DOCUMENTED IN MEDICAL RECORD: ICD-10-PCS | Mod: CPTII,,, | Performed by: OBSTETRICS & GYNECOLOGY

## 2022-02-10 PROCEDURE — 1160F PR REVIEW ALL MEDS BY PRESCRIBER/CLIN PHARMACIST DOCUMENTED: ICD-10-PCS | Mod: CPTII,,, | Performed by: OBSTETRICS & GYNECOLOGY

## 2022-02-10 RX ORDER — ASPIRIN 81 MG/1
81 TABLET ORAL DAILY
Qty: 150 TABLET | Refills: 2 | Status: SHIPPED | OUTPATIENT
Start: 2022-02-10 | End: 2022-06-07 | Stop reason: SDUPTHER

## 2022-02-10 RX ORDER — TERCONAZOLE 4 MG/G
1 CREAM VAGINAL NIGHTLY
Qty: 1 EACH | Refills: 0 | Status: SHIPPED | OUTPATIENT
Start: 2022-02-10 | End: 2022-02-17

## 2022-02-10 RX ORDER — PROMETHAZINE HYDROCHLORIDE 25 MG/1
25 TABLET ORAL EVERY 6 HOURS PRN
Qty: 25 TABLET | Refills: 0 | Status: ON HOLD | OUTPATIENT
Start: 2022-02-10 | End: 2022-09-04 | Stop reason: HOSPADM

## 2022-02-10 RX ORDER — PYRIDOXINE HCL (VITAMIN B6) 100 MG
100 TABLET ORAL
Qty: 60 TABLET | Refills: 3 | COMMUNITY
Start: 2022-02-10 | End: 2022-03-29 | Stop reason: SDUPTHER

## 2022-02-10 NOTE — PROGRESS NOTES
OBSTETRICS /GYNECOLOGY     CC: Absence of menses / positive UPT at home     Erika Vasquez is a 31 y.o. female  presents with complaint of absence of menstruation.    She denies nausea/vomIting/abdominal pain/bleeding.  UPT is positive.     History reviewed. No pertinent past medical history.  Past Surgical History:   Procedure Laterality Date    GALLBLADDER SURGERY       Social History     Socioeconomic History    Marital status: Unknown   Tobacco Use    Smoking status: Never Smoker    Smokeless tobacco: Never Used   Substance and Sexual Activity    Alcohol use: Not Currently    Drug use: Never    Sexual activity: Yes     Partners: Male     Family History   Problem Relation Age of Onset    Hypertension Father     Diabetes Father      OB History    Para Term  AB Living   2 1 1     1   SAB IAB Ectopic Multiple Live Births           1      # Outcome Date GA Lbr Ken/2nd Weight Sex Delivery Anes PTL Lv   2 Current            1 Term  38w3d    CS-Unspec   BOBBY      Complications: Pre-eclampsia       /70   Wt 103.8 kg (228 lb 13.4 oz)   LMP 2021 (Approximate)   BMI 40.54 kg/m²     ROS:  GENERAL: Denies weight gain or weight loss. Feeling well overall.   SKIN: Denies rash or lesions.   HEAD: Denies head injury or headache.   NODES: Denies enlarged lymph nodes.   CHEST: Denies chest pain or shortness of breath.   CARDIOVASCULAR: Denies palpitations or left sided chest pain.   ABDOMEN: No abdominal pain, constipation, diarrhea, nausea, vomiting or rectal bleeding.   URINARY: No frequency, dysuria, hematuria, or burning on urination.  REPRODUCTIVE: See HPI.   BREASTS: The patient performs breast self-examination and denies pain, lumps, or nipple discharge.   HEMATOLOGIC: No easy bruisability or excessive bleeding.   MUSCULOSKELETAL: Denies joint pain or swelling.   NEUROLOGIC: Denies syncope or weakness.   PSYCHIATRIC: Denies depression, anxiety or mood swings.    PE:    APPEARANCE: Well nourished, well developed, in no acute distress.  AFFECT: WNL, alert and oriented x 3.  SKIN: No acne or hirsutism.  NECK: Neck symmetric without masses or thyromegaly.  NODES: No inguinal, cervical, axillary or femoral lymph node enlargement.  CHEST: Good respiratory effort.   ABDOMEN: Soft. No tenderness or masses. No hepatosplenomegaly. No hernias.  BREASTS: Symmetrical, no skin changes or visible lesions. No palpable masses, nipple discharge bilaterally.  PELVIC: Normal external female genitalia without lesions. Normal hair distribution. Adequate perineal body, normal urethral meatus. Vagina moist and well rugated without lesions or discharge. Cervix pink, without lesions, discharge or tenderness. No significant cystocele or rectocele. Bimanual exam shows uterus is 10 weeks, regular, mobile and nontender. Adnexa without masses or tenderness.  EXTREMITIES: No edema.          ASSESSMENT and PLAN:    1-Pregnancy Examination test , positive    3-Hx Preeclampsia previous pregnancy  3-Hx C/S x1   4-Obesity     Prenatal Labs  Dating US  GC/CT  Affirm   PAP    Rx for Terazol      Patient was counseled today on proper weight gain based on the [place] [employer]'s recommendations based on her pre-pregnancy weight. Discussed foods to avoid in pregnancy (i.e. sushi, fish that are high in mercury, deli meat, and unpasteurized cheeses). Discussed prenatal vitamin options (i.e. stool softener, DHA). Contingency screen offered - patient desires.  Discussed: Common complaints of pregnancy, HIV and other routine prenatal tests, Tobacco abuse, risk factors identified by prenatal history, Anticipated course of prenatal careNutrition and weight gain counseling,Toxoplasmosis precautions (Cats/Raw Meat) Exercise, Alcohol, Illicit/Recreational Drugs, Seat belt use, Childbirth classes/Hospital facilities.The counseling session lasted approximately 25 minutes, and all her questions were answered.    Discussed  nonpharmacological pain relief methods for labor, techniques and benefits of effective breastfeeding position and latch, and basic breastfeeding management. Encouraged patient to attend [facility]s Prenatal Breastfeeding Class and to download [facility] My[facility]  mobile aydee if she has not already done so. Patient verbalizes understanding.    Will review US and prenatal labs       Follow up in  4 weeks       I spent a total of 30 minutes on the day of the visit.This includes face to face time and non-face to face time preparing to see the patient (eg, review of tests), Obtaining and/or reviewing separately obtained history, Documenting clinical information in the electronic or other health record, Independently interpreting resultsand communicating results to the patient/family/caregiver, or Care coordination.      Barber Moya M.D.   OB/GYN    2/10/2022

## 2022-02-11 LAB
BACTERIA UR CULT: NORMAL
BACTERIA UR CULT: NORMAL
HAV IGM SERPL QL IA: NEGATIVE
HBV CORE IGM SERPL QL IA: NEGATIVE
HBV SURFACE AG SERPL QL IA: NEGATIVE
HCV AB SERPL QL IA: NEGATIVE
HIV 1+2 AB+HIV1 P24 AG SERPL QL IA: NEGATIVE
RPR SER QL: NORMAL
RUBV IGG SER-ACNC: 50.7 IU/ML
RUBV IGG SER-IMP: REACTIVE

## 2022-02-13 LAB
C TRACH DNA SPEC QL NAA+PROBE: NOT DETECTED
N GONORRHOEA DNA SPEC QL NAA+PROBE: NOT DETECTED

## 2022-02-14 ENCOUNTER — TELEPHONE (OUTPATIENT)
Dept: OBSTETRICS AND GYNECOLOGY | Facility: CLINIC | Age: 32
End: 2022-02-14
Payer: MEDICAID

## 2022-02-14 NOTE — TELEPHONE ENCOUNTER
----- Message from Barber Moya MD sent at 2/14/2022  8:45 AM CST -----  Gonorrhea / Chlamydia cultures are negative       Barber Moya M.D.   OB/GYN

## 2022-02-15 LAB
BACTERIAL VAGINOSIS DNA: POSITIVE
CANDIDA GLABRATA DNA: NEGATIVE
CANDIDA KRUSEI DNA: NEGATIVE
CANDIDA RRNA VAG QL PROBE: POSITIVE
HGB FRACT BLD ELPH PH6.0-IMP: NORMAL
T VAGINALIS RRNA GENITAL QL PROBE: NEGATIVE

## 2022-02-16 ENCOUNTER — TELEPHONE (OUTPATIENT)
Dept: OBSTETRICS AND GYNECOLOGY | Facility: CLINIC | Age: 32
End: 2022-02-16
Payer: MEDICAID

## 2022-02-16 RX ORDER — METRONIDAZOLE 500 MG/1
500 TABLET ORAL EVERY 12 HOURS
Qty: 14 TABLET | Refills: 0 | Status: SHIPPED | OUTPATIENT
Start: 2022-02-16 | End: 2022-02-23

## 2022-02-17 LAB
CLINICAL INFO: ABNORMAL
CYTO CVX: ABNORMAL
CYTOLOGIST CVX/VAG CYTO: ABNORMAL
CYTOLOGIST CVX/VAG CYTO: ABNORMAL
CYTOLOGY CMNT CVX/VAG CYTO-IMP: ABNORMAL
CYTOLOGY PAP THIN PREP EXPLANATION: ABNORMAL
DATE OF PREVIOUS PAP: ABNORMAL
DATE PREVIOUS BX: NO
GEN CATEG CVX/VAG CYTO-IMP: ABNORMAL
HPV E6+E7 MRNA CVX QL NAA+PROBE: DETECTED
HPV I/H RISK 4 DNA CVX QL NAA+PROBE: NORMAL
HPV16 DNA CVX QL NAA+PROBE: NOT DETECTED
HPV18+45 E6+E7 MRNA CVX QL NAA+PROBE: NOT DETECTED
LMP START DATE: ABNORMAL
MICROORGANISM CVX/VAG CYTO: ABNORMAL
PATHOLOGIST CVX/VAG CYTO: ABNORMAL
SERVICE CMNT-IMP: ABNORMAL
SPECIMEN SOURCE CVX/VAG CYTO: ABNORMAL
STAT OF ADQ CVX/VAG CYTO-IMP: ABNORMAL

## 2022-02-17 NOTE — TELEPHONE ENCOUNTER
Affirm resulted  Positive for BV and Yeast .  Rx for flagyl  sent to pharmacy on file  Please inform patient    Barber Moya M.D.   OB/GYN

## 2022-02-18 ENCOUNTER — TELEPHONE (OUTPATIENT)
Dept: OBSTETRICS AND GYNECOLOGY | Facility: CLINIC | Age: 32
End: 2022-02-18
Payer: MEDICAID

## 2022-02-18 LAB
HGB A2 MFR BLD HPLC: 3.2 % (ref 2.2–3.2)
HGB FRACT BLD ELPH-IMP: NORMAL
HGB FRACT BLD ELPH-IMP: NORMAL

## 2022-02-18 NOTE — TELEPHONE ENCOUNTER
PAP reviewed    ASCUS    HOV is positive     Needs colposcopy     Thanks     Barber Moya M.D.   OB/GYN    2/18/2022

## 2022-02-24 LAB
CFTR MUT ANL BLD/T: NEGATIVE
CFTR MUT ANL BLD/T: NORMAL
CFTR MUT TESTED BLD/T: NORMAL
GENETICIST REVIEW: NORMAL
REF LAB TEST METHOD: NORMAL

## 2022-03-06 ENCOUNTER — HOSPITAL ENCOUNTER (INPATIENT)
Facility: HOSPITAL | Age: 32
LOS: 3 days | Discharge: HOME | DRG: 833 | End: 2022-03-09
Attending: EMERGENCY MEDICINE | Admitting: OBSTETRICS & GYNECOLOGY
Payer: MEDICAID

## 2022-03-06 DIAGNOSIS — N12 PYELONEPHRITIS: Primary | ICD-10-CM

## 2022-03-06 DIAGNOSIS — Z3A.14 14 WEEKS GESTATION OF PREGNANCY: ICD-10-CM

## 2022-03-06 LAB
ALBUMIN SERPL BCP-MCNC: 3.9 G/DL (ref 3.5–5.2)
ALP SERPL-CCNC: 75 U/L (ref 55–135)
ALT SERPL W/O P-5'-P-CCNC: 9 U/L (ref 10–44)
ANION GAP SERPL CALC-SCNC: 13 MMOL/L (ref 8–16)
AST SERPL-CCNC: 13 U/L (ref 10–40)
BASOPHILS # BLD AUTO: 0.04 K/UL (ref 0–0.2)
BASOPHILS NFR BLD: 0.2 % (ref 0–1.9)
BILIRUB SERPL-MCNC: 0.3 MG/DL (ref 0.1–1)
BILIRUB UR QL STRIP: NEGATIVE
BUN SERPL-MCNC: 4 MG/DL (ref 6–20)
CALCIUM SERPL-MCNC: 10 MG/DL (ref 8.7–10.5)
CHLORIDE SERPL-SCNC: 103 MMOL/L (ref 95–110)
CLARITY UR: ABNORMAL
CO2 SERPL-SCNC: 20 MMOL/L (ref 23–29)
COLOR UR: YELLOW
CREAT SERPL-MCNC: 0.7 MG/DL (ref 0.5–1.4)
CTP QC/QA: YES
DIFFERENTIAL METHOD: ABNORMAL
EOSINOPHIL # BLD AUTO: 0.1 K/UL (ref 0–0.5)
EOSINOPHIL NFR BLD: 0.7 % (ref 0–8)
ERYTHROCYTE [DISTWIDTH] IN BLOOD BY AUTOMATED COUNT: 13.9 % (ref 11.5–14.5)
EST. GFR  (AFRICAN AMERICAN): >60 ML/MIN/1.73 M^2
EST. GFR  (NON AFRICAN AMERICAN): >60 ML/MIN/1.73 M^2
GLUCOSE SERPL-MCNC: 101 MG/DL (ref 70–110)
GLUCOSE UR QL STRIP: NEGATIVE
HCT VFR BLD AUTO: 37.1 % (ref 37–48.5)
HGB BLD-MCNC: 12.7 G/DL (ref 12–16)
HGB UR QL STRIP: ABNORMAL
IMM GRANULOCYTES # BLD AUTO: 0.06 K/UL (ref 0–0.04)
IMM GRANULOCYTES NFR BLD AUTO: 0.4 % (ref 0–0.5)
KETONES UR QL STRIP: NEGATIVE
LEUKOCYTE ESTERASE UR QL STRIP: ABNORMAL
LYMPHOCYTES # BLD AUTO: 2.2 K/UL (ref 1–4.8)
LYMPHOCYTES NFR BLD: 13.5 % (ref 18–48)
MCH RBC QN AUTO: 26.8 PG (ref 27–31)
MCHC RBC AUTO-ENTMCNC: 34.2 G/DL (ref 32–36)
MCV RBC AUTO: 78 FL (ref 82–98)
MICROSCOPIC COMMENT: ABNORMAL
MONOCYTES # BLD AUTO: 1.2 K/UL (ref 0.3–1)
MONOCYTES NFR BLD: 7.5 % (ref 4–15)
NEUTROPHILS # BLD AUTO: 12.6 K/UL (ref 1.8–7.7)
NEUTROPHILS NFR BLD: 77.7 % (ref 38–73)
NITRITE UR QL STRIP: NEGATIVE
NON-SQ EPI CELLS #/AREA URNS HPF: 1 /HPF
NRBC BLD-RTO: 0 /100 WBC
PH UR STRIP: 7 [PH] (ref 5–8)
PLATELET # BLD AUTO: 346 K/UL (ref 150–450)
PMV BLD AUTO: 10.4 FL (ref 9.2–12.9)
POTASSIUM SERPL-SCNC: 3.8 MMOL/L (ref 3.5–5.1)
PROT SERPL-MCNC: 7.7 G/DL (ref 6–8.4)
PROT UR QL STRIP: NEGATIVE
RBC # BLD AUTO: 4.73 M/UL (ref 4–5.4)
RBC #/AREA URNS HPF: 4 /HPF (ref 0–4)
SARS-COV-2 RDRP RESP QL NAA+PROBE: NEGATIVE
SODIUM SERPL-SCNC: 136 MMOL/L (ref 136–145)
SP GR UR STRIP: 1.01 (ref 1–1.03)
SQUAMOUS #/AREA URNS HPF: 4 /HPF
URN SPEC COLLECT METH UR: ABNORMAL
UROBILINOGEN UR STRIP-ACNC: NEGATIVE EU/DL
WBC # BLD AUTO: 16.22 K/UL (ref 3.9–12.7)
WBC #/AREA URNS HPF: 83 /HPF (ref 0–5)

## 2022-03-06 PROCEDURE — 96361 HYDRATE IV INFUSION ADD-ON: CPT

## 2022-03-06 PROCEDURE — 12000002 HC ACUTE/MED SURGE SEMI-PRIVATE ROOM

## 2022-03-06 PROCEDURE — 25000003 PHARM REV CODE 250: Performed by: NURSE PRACTITIONER

## 2022-03-06 PROCEDURE — G0378 HOSPITAL OBSERVATION PER HR: HCPCS

## 2022-03-06 PROCEDURE — 87077 CULTURE AEROBIC IDENTIFY: CPT | Performed by: NURSE PRACTITIONER

## 2022-03-06 PROCEDURE — 85025 COMPLETE CBC W/AUTO DIFF WBC: CPT | Performed by: NURSE PRACTITIONER

## 2022-03-06 PROCEDURE — 87086 URINE CULTURE/COLONY COUNT: CPT | Performed by: NURSE PRACTITIONER

## 2022-03-06 PROCEDURE — 87088 URINE BACTERIA CULTURE: CPT | Performed by: NURSE PRACTITIONER

## 2022-03-06 PROCEDURE — 87186 SC STD MICRODIL/AGAR DIL: CPT | Performed by: NURSE PRACTITIONER

## 2022-03-06 PROCEDURE — U0002 COVID-19 LAB TEST NON-CDC: HCPCS | Performed by: NURSE PRACTITIONER

## 2022-03-06 PROCEDURE — 63600175 PHARM REV CODE 636 W HCPCS: Performed by: NURSE PRACTITIONER

## 2022-03-06 PROCEDURE — 80053 COMPREHEN METABOLIC PANEL: CPT | Performed by: NURSE PRACTITIONER

## 2022-03-06 PROCEDURE — 81000 URINALYSIS NONAUTO W/SCOPE: CPT | Performed by: NURSE PRACTITIONER

## 2022-03-06 PROCEDURE — 96375 TX/PRO/DX INJ NEW DRUG ADDON: CPT

## 2022-03-06 PROCEDURE — 96365 THER/PROPH/DIAG IV INF INIT: CPT

## 2022-03-06 RX ORDER — ACETAMINOPHEN 325 MG/1
650 TABLET ORAL EVERY 8 HOURS PRN
Status: DISCONTINUED | OUTPATIENT
Start: 2022-03-06 | End: 2022-03-07

## 2022-03-06 RX ORDER — TALC
6 POWDER (GRAM) TOPICAL NIGHTLY PRN
Status: DISCONTINUED | OUTPATIENT
Start: 2022-03-06 | End: 2022-03-06

## 2022-03-06 RX ORDER — SODIUM CHLORIDE 0.9 % (FLUSH) 0.9 %
10 SYRINGE (ML) INJECTION
Status: DISCONTINUED | OUTPATIENT
Start: 2022-03-06 | End: 2022-03-09 | Stop reason: HOSPADM

## 2022-03-06 RX ORDER — ONDANSETRON 2 MG/ML
4 INJECTION INTRAMUSCULAR; INTRAVENOUS EVERY 8 HOURS PRN
Status: DISCONTINUED | OUTPATIENT
Start: 2022-03-06 | End: 2022-03-09 | Stop reason: HOSPADM

## 2022-03-06 RX ORDER — ONDANSETRON 2 MG/ML
4 INJECTION INTRAMUSCULAR; INTRAVENOUS
Status: COMPLETED | OUTPATIENT
Start: 2022-03-06 | End: 2022-03-06

## 2022-03-06 RX ADMIN — SODIUM CHLORIDE 1000 ML: 0.9 INJECTION, SOLUTION INTRAVENOUS at 08:03

## 2022-03-06 RX ADMIN — ACETAMINOPHEN 650 MG: 325 TABLET ORAL at 08:03

## 2022-03-06 RX ADMIN — ONDANSETRON 4 MG: 2 INJECTION INTRAMUSCULAR; INTRAVENOUS at 08:03

## 2022-03-06 RX ADMIN — CEFTRIAXONE SODIUM 2 G: 2 INJECTION, POWDER, FOR SOLUTION INTRAMUSCULAR; INTRAVENOUS at 06:03

## 2022-03-06 NOTE — Clinical Note
Diagnosis: Pyelonephritis [[id number]]   Future Attending Provider: TATUM LAKE [[id number]]   Admitting Provider:: TATUM LAKE [[id number]]

## 2022-03-06 NOTE — ED NOTES
Patient arrives for evaluation of dysuria and pyuria x 1 week with pain to suprapubic area and radiation of pain into right flank intermittently - mild nausea without emesis

## 2022-03-06 NOTE — ED PROVIDER NOTES
Encounter Date: 3/6/2022    SCRIBE #1 NOTE: I, Beckaadrian Crane and am scribing for, and in the presence of, Peg Bradford NP.       History     Chief Complaint   Patient presents with    Urinary Tract Infection     Pain, frequency and burning with urination, pain radiates to her right flank area. X 1 week. Fever x 2 days. No hematuria noted. No vomiting but is nauseated. Patient was given perscription for UTI but stated that the pharmacy did not give it to her.      Erika Vasquez is a 31 y.o. female who  has no past medical history on file.    The patient presents to the ED due to flank pain.  Patient presents 13-14 weeks pregnant and reports symptoms started 1 week ago, are located to her right flank, and have been present constantly. Described as severe pain. Associated symptoms include dysuria, frequency, subjective fevers, chills, shaking, and nausea. Patient denies rash, neck stiffness, chest pain, shortness of breath, vomiting, vaginal bleeding, or discharge. Patient has a past medical history of UTI's and notes she was last hospitalized 1 year ago. Her OBGYN is Dr. Moya.      The history is provided by the patient. A  was used.     Review of patient's allergies indicates:  No Known Allergies  No past medical history on file.  Past Surgical History:   Procedure Laterality Date    GALLBLADDER SURGERY       Family History   Problem Relation Age of Onset    Hypertension Father     Diabetes Father      Social History     Tobacco Use    Smoking status: Never Smoker    Smokeless tobacco: Never Used   Substance Use Topics    Alcohol use: Not Currently    Drug use: Never     Review of Systems   Constitutional: Positive for chills and fever.        Shaking    HENT: Negative for sore throat.    Respiratory: Negative for shortness of breath.    Cardiovascular: Negative for chest pain.   Gastrointestinal: Positive for nausea. Negative for vomiting.   Genitourinary:  Positive for dysuria, flank pain and frequency. Negative for vaginal bleeding and vaginal discharge.   Musculoskeletal: Negative for back pain and neck stiffness.   Skin: Negative for rash.   Neurological: Negative for weakness.   Hematological: Does not bruise/bleed easily.       Physical Exam     Initial Vitals [03/06/22 1621]   BP Pulse Resp Temp SpO2   127/79 110 20 98 °F (36.7 °C) 99 %      MAP       --         Physical Exam    Nursing note and vitals reviewed.  Constitutional: She appears well-developed and well-nourished. She is not diaphoretic.  Non-toxic appearance. She appears ill. No distress.   HENT:   Head: Normocephalic and atraumatic.   Mouth/Throat: Oropharynx is clear and moist.   Eyes: EOM are normal. Pupils are equal, round, and reactive to light.   Neck: Neck supple. No tracheal deviation present.   No erythema or edema.   Normal range of motion.  Cardiovascular: Normal rate, regular rhythm, normal heart sounds and intact distal pulses.   Pulmonary/Chest: Breath sounds normal. No stridor. No respiratory distress. She has no wheezes.   Abdominal: Abdomen is soft. Bowel sounds are normal. She exhibits no distension and no mass. There is no abdominal tenderness.   Musculoskeletal:         General: No edema. Normal range of motion.      Cervical back: Normal range of motion and neck supple.      Comments: Right CVA tenderness.     Neurological: She is alert and oriented to person, place, and time. She has normal strength. No cranial nerve deficit or sensory deficit.   Skin: Skin is warm and dry. Capillary refill takes less than 2 seconds. No pallor.   Psychiatric: She has a normal mood and affect. Her behavior is normal. Thought content normal.         ED Course   Procedures  Labs Reviewed   URINALYSIS, REFLEX TO URINE CULTURE - Abnormal; Notable for the following components:       Result Value    Appearance, UA Hazy (*)     Occult Blood UA Trace (*)     Leukocytes, UA 3+ (*)     All other components  within normal limits    Narrative:     Specimen Source->Urine   COMPREHENSIVE METABOLIC PANEL - Abnormal; Notable for the following components:    CO2 20 (*)     BUN 4 (*)     ALT 9 (*)     All other components within normal limits   CBC W/ AUTO DIFFERENTIAL - Abnormal; Notable for the following components:    WBC 16.22 (*)     MCV 78 (*)     MCH 26.8 (*)     Gran # (ANC) 12.6 (*)     Immature Grans (Abs) 0.06 (*)     Mono # 1.2 (*)     Gran % 77.7 (*)     Lymph % 13.5 (*)     All other components within normal limits   URINALYSIS MICROSCOPIC - Abnormal; Notable for the following components:    WBC, UA 83 (*)     Non-Squam Epith 1 (*)     All other components within normal limits    Narrative:     Specimen Source->Urine   CULTURE, URINE   SARS-COV-2 RDRP GENE          Imaging Results          US Kidney (Final result)  Result time 03/06/22 19:05:29    Final result by Aris Cortes MD (03/06/22 19:05:29)                 Impression:      No acute sonographic abnormalities of the kidneys or bladder.    Possible hepatic steatosis, correlation with LFTs as warranted.      Electronically signed by: Aris Cortes MD  Date:    03/06/2022  Time:    19:05             Narrative:    EXAMINATION:  US KIDNEY    CLINICAL HISTORY:  right flank pain, ? kidney stone. Pt is 13 weeks pregnant;    TECHNIQUE:  Ultrasound of the kidneys was performed including color flow and Doppler evaluation of the kidneys.    COMPARISON:  None.    FINDINGS:  Right kidney: The right kidney measures 12.7 cm. No cortical thinning. No loss of corticomedullary distinction. Resistive index measures 0.62.  No mass. No renal stone. No hydronephrosis.    Left kidney: The left kidney measures 12.8 cm. No cortical thinning. No loss of corticomedullary distinction. Resistive index measures 0.5.  No mass. No renal stone. No hydronephrosis.    The hepatic parenchyma is somewhat echogenic, possibly reflecting steatosis.                                  Medications   sodium chloride 0.9% flush 10 mL (has no administration in time range)   cefTRIAXone (ROCEPHIN) 1 g/50 mL D5W IVPB (has no administration in time range)   ondansetron injection 4 mg (has no administration in time range)   acetaminophen tablet 650 mg (650 mg Oral Given 3/6/22 2031)   cefTRIAXone (ROCEPHIN) 2 g/50 mL D5W IVPB (0 g Intravenous Stopped 3/6/22 1910)   sodium chloride 0.9% bolus 1,000 mL (0 mLs Intravenous Stopped 3/6/22 2129)   ondansetron injection 4 mg (4 mg Intravenous Given 3/6/22 2031)     Medical Decision Making:   Initial Assessment:   The patient presents to the ED due to flank pain. Physical exam noted for no neck rigidity, no neck erythema, no neck decreased range of motion, no neck edema, and right CVA tenderness.    Differential Diagnosis:   Ddx includes but is not limited to kidney stone, UTI, renal colic, renal mass, renal cyst, considered AAA however lower likelihood at this time.     Clinical Tests:   Lab Tests: Ordered and Reviewed  Radiological Study: Ordered and Reviewed          Scribe Attestation:   Scribe #1: I performed the above scribed service and the documentation accurately describes the services I performed. I attest to the accuracy of the note.        ED Course as of 03/06/22 2208   Sun Mar 06, 2022   1935 Spoke with Dr Sen concerning admission. She states she will place orders on the pt at this time.  [DT]   1945 I have spoken with the pt concerning the need for admission and the plan. Verbalized understanding and is stable for admission.  [DT]      ED Course User Index  [DT] Peg Bradford NP             Clinical Impression:   Final diagnoses:  [N12] Pyelonephritis (Primary)          ED Disposition Condition    Observation             I, Peg Bradford NP, personally performed the services described in this documentation.All medical record entries made by the scribe were at my direction and in my presence.I have reviewed the chart and agree that the  record reflects my personal performance and is accurate and complete.        Peg Bradford, MAIRA  03/06/22 1937       Peg Bradford, MAIRA  03/06/22 1833

## 2022-03-07 LAB
ALBUMIN SERPL BCP-MCNC: 3.3 G/DL (ref 3.5–5.2)
ALP SERPL-CCNC: 68 U/L (ref 55–135)
ALT SERPL W/O P-5'-P-CCNC: 11 U/L (ref 10–44)
ANION GAP SERPL CALC-SCNC: 10 MMOL/L (ref 8–16)
AST SERPL-CCNC: 10 U/L (ref 10–40)
BASOPHILS # BLD AUTO: 0.04 K/UL (ref 0–0.2)
BASOPHILS NFR BLD: 0.3 % (ref 0–1.9)
BILIRUB SERPL-MCNC: 0.4 MG/DL (ref 0.1–1)
BUN SERPL-MCNC: 6 MG/DL (ref 6–20)
CALCIUM SERPL-MCNC: 8.9 MG/DL (ref 8.7–10.5)
CHLORIDE SERPL-SCNC: 106 MMOL/L (ref 95–110)
CO2 SERPL-SCNC: 22 MMOL/L (ref 23–29)
CREAT SERPL-MCNC: 0.7 MG/DL (ref 0.5–1.4)
DIFFERENTIAL METHOD: ABNORMAL
EOSINOPHIL # BLD AUTO: 0.1 K/UL (ref 0–0.5)
EOSINOPHIL NFR BLD: 1 % (ref 0–8)
ERYTHROCYTE [DISTWIDTH] IN BLOOD BY AUTOMATED COUNT: 14 % (ref 11.5–14.5)
EST. GFR  (AFRICAN AMERICAN): >60 ML/MIN/1.73 M^2
EST. GFR  (NON AFRICAN AMERICAN): >60 ML/MIN/1.73 M^2
GLUCOSE SERPL-MCNC: 81 MG/DL (ref 70–110)
HCT VFR BLD AUTO: 33.2 % (ref 37–48.5)
HGB BLD-MCNC: 11.2 G/DL (ref 12–16)
IMM GRANULOCYTES # BLD AUTO: 0.05 K/UL (ref 0–0.04)
IMM GRANULOCYTES NFR BLD AUTO: 0.4 % (ref 0–0.5)
LYMPHOCYTES # BLD AUTO: 2.4 K/UL (ref 1–4.8)
LYMPHOCYTES NFR BLD: 17.7 % (ref 18–48)
MCH RBC QN AUTO: 27.1 PG (ref 27–31)
MCHC RBC AUTO-ENTMCNC: 33.7 G/DL (ref 32–36)
MCV RBC AUTO: 80 FL (ref 82–98)
MONOCYTES # BLD AUTO: 1.1 K/UL (ref 0.3–1)
MONOCYTES NFR BLD: 8 % (ref 4–15)
NEUTROPHILS # BLD AUTO: 9.9 K/UL (ref 1.8–7.7)
NEUTROPHILS NFR BLD: 72.6 % (ref 38–73)
NRBC BLD-RTO: 0 /100 WBC
PLATELET # BLD AUTO: 289 K/UL (ref 150–450)
PMV BLD AUTO: 10.9 FL (ref 9.2–12.9)
POTASSIUM SERPL-SCNC: 3.9 MMOL/L (ref 3.5–5.1)
PROT SERPL-MCNC: 6.7 G/DL (ref 6–8.4)
RBC # BLD AUTO: 4.14 M/UL (ref 4–5.4)
SODIUM SERPL-SCNC: 138 MMOL/L (ref 136–145)
WBC # BLD AUTO: 13.64 K/UL (ref 3.9–12.7)

## 2022-03-07 PROCEDURE — 99222 1ST HOSP IP/OBS MODERATE 55: CPT | Mod: ,,, | Performed by: OBSTETRICS & GYNECOLOGY

## 2022-03-07 PROCEDURE — 99222 PR INITIAL HOSPITAL CARE,LEVL II: ICD-10-PCS | Mod: ,,, | Performed by: OBSTETRICS & GYNECOLOGY

## 2022-03-07 PROCEDURE — 25000003 PHARM REV CODE 250: Performed by: NURSE PRACTITIONER

## 2022-03-07 PROCEDURE — 85025 COMPLETE CBC W/AUTO DIFF WBC: CPT | Performed by: NURSE PRACTITIONER

## 2022-03-07 PROCEDURE — 63600175 PHARM REV CODE 636 W HCPCS: Performed by: NURSE PRACTITIONER

## 2022-03-07 PROCEDURE — 80053 COMPREHEN METABOLIC PANEL: CPT | Performed by: NURSE PRACTITIONER

## 2022-03-07 PROCEDURE — 11000001 HC ACUTE MED/SURG PRIVATE ROOM

## 2022-03-07 PROCEDURE — 99285 EMERGENCY DEPT VISIT HI MDM: CPT | Mod: 25

## 2022-03-07 PROCEDURE — 25000003 PHARM REV CODE 250: Performed by: OBSTETRICS & GYNECOLOGY

## 2022-03-07 PROCEDURE — 96365 THER/PROPH/DIAG IV INF INIT: CPT | Mod: 59

## 2022-03-07 PROCEDURE — 94761 N-INVAS EAR/PLS OXIMETRY MLT: CPT

## 2022-03-07 RX ORDER — ACETAMINOPHEN 325 MG/1
650 TABLET ORAL EVERY 8 HOURS PRN
Status: DISCONTINUED | OUTPATIENT
Start: 2022-03-07 | End: 2022-03-09 | Stop reason: HOSPADM

## 2022-03-07 RX ORDER — PROMETHAZINE HYDROCHLORIDE 25 MG/ML
12.5 INJECTION, SOLUTION INTRAMUSCULAR; INTRAVENOUS EVERY 6 HOURS PRN
Status: DISCONTINUED | OUTPATIENT
Start: 2022-03-07 | End: 2022-03-09 | Stop reason: HOSPADM

## 2022-03-07 RX ORDER — OXYCODONE AND ACETAMINOPHEN 5; 325 MG/1; MG/1
1 TABLET ORAL EVERY 6 HOURS PRN
Status: DISCONTINUED | OUTPATIENT
Start: 2022-03-07 | End: 2022-03-09 | Stop reason: HOSPADM

## 2022-03-07 RX ORDER — PRENATAL WITH FERROUS FUM AND FOLIC ACID 3080; 920; 120; 400; 22; 1.84; 3; 20; 10; 1; 12; 200; 27; 25; 2 [IU]/1; [IU]/1; MG/1; [IU]/1; MG/1; MG/1; MG/1; MG/1; MG/1; MG/1; UG/1; MG/1; MG/1; MG/1; MG/1
1 TABLET ORAL DAILY
Status: DISCONTINUED | OUTPATIENT
Start: 2022-03-08 | End: 2022-03-09 | Stop reason: HOSPADM

## 2022-03-07 RX ORDER — HYDROMORPHONE HYDROCHLORIDE 1 MG/ML
1 INJECTION, SOLUTION INTRAMUSCULAR; INTRAVENOUS; SUBCUTANEOUS EVERY 6 HOURS PRN
Status: DISCONTINUED | OUTPATIENT
Start: 2022-03-07 | End: 2022-03-09 | Stop reason: HOSPADM

## 2022-03-07 RX ADMIN — CEFTRIAXONE 1 G: 1 INJECTION, SOLUTION INTRAVENOUS at 08:03

## 2022-03-07 RX ADMIN — OXYCODONE HYDROCHLORIDE AND ACETAMINOPHEN 1 TABLET: 5; 325 TABLET ORAL at 03:03

## 2022-03-07 RX ADMIN — OXYCODONE HYDROCHLORIDE AND ACETAMINOPHEN 1 TABLET: 5; 325 TABLET ORAL at 10:03

## 2022-03-07 RX ADMIN — ACETAMINOPHEN 650 MG: 325 TABLET ORAL at 08:03

## 2022-03-07 NOTE — ED NOTES
Pt lying R lateral recumbent, resting w/ eyes closed and mild snorous respirations. Pt is in no respiratory distress, no nasal flaring and no use of accessory muscles. Skin is warm, dry and pink. VS. Will not disturb. Will continue to monitor closely.

## 2022-03-07 NOTE — ED NOTES
Call placed to Dr. Moya. Informed pt is C/O 10/10 R flank pain and is diagnosed with Pyelonephritis. Verbal orders for pain medication and am labs noted. Dr. Moya also states to request that pt be placed on mother baby for 3rd floor. Informed charge nurse. Informed pt that I did speak to Dr. Moya and that pain medication has been ordered. Translation per Magali PCT. Pt verbalizes understanding.

## 2022-03-07 NOTE — ED NOTES
Patient complained of HA, medicaiton administered. Patient changed into gown. Bed rails up x2, bed in low position, call light within reach.

## 2022-03-07 NOTE — ED NOTES
used for communication for pt. Pt states that 6 days ago she started having right flank pain and on Wednesday started having intermittent fevers. Pt also complains of nausea. Pt is alert and oriented x4. Continuous cardaic monitor applied to pt and bed in lowest, locked position with side rails up and call light within reach. Will continue to monitor.

## 2022-03-07 NOTE — ED NOTES
Pt aaox3 with iv antbiotics infusing. Pt talking on the cell phone. C/O 5/10 discomfort to R flank area.

## 2022-03-07 NOTE — ED NOTES
Pt lying L lateral recumbent, A & O x 3, NAD. Respirations are even and unlabored. Skin is warm, dry and pink. VSS. Will not disturb. Will continue to monitor closely.

## 2022-03-07 NOTE — ED NOTES
Pt resting w/ eyes closed and in no obvious distress. Respirations are even and unlabored. Skin is warm, dry and pink. VSS. = rise and fall of chest wall noted. Will not disturb. Will continue to monitor closely.

## 2022-03-07 NOTE — ED NOTES
Rec'd report from JASMIN Weber RN. Pt is in semi-fowlers position in bed w/o complaint at this time. Pt denies pain/discomfort. Pt is A & O x 3, denies SOB, respiratory distress and respirations are even and unlabored. Skin is warm and dry w/ pink mucosa. VS. Bed is locked and in the low position w/ the side rails up and locked for safety. Call bell @ BS. Will continue to monitor closely.

## 2022-03-08 LAB
BASOPHILS # BLD AUTO: 0.03 K/UL (ref 0–0.2)
BASOPHILS NFR BLD: 0.2 % (ref 0–1.9)
DIFFERENTIAL METHOD: ABNORMAL
EOSINOPHIL # BLD AUTO: 0.1 K/UL (ref 0–0.5)
EOSINOPHIL NFR BLD: 0.9 % (ref 0–8)
ERYTHROCYTE [DISTWIDTH] IN BLOOD BY AUTOMATED COUNT: 13.7 % (ref 11.5–14.5)
HCT VFR BLD AUTO: 32.2 % (ref 37–48.5)
HGB BLD-MCNC: 11 G/DL (ref 12–16)
IMM GRANULOCYTES # BLD AUTO: 0.05 K/UL (ref 0–0.04)
IMM GRANULOCYTES NFR BLD AUTO: 0.4 % (ref 0–0.5)
LYMPHOCYTES # BLD AUTO: 2.1 K/UL (ref 1–4.8)
LYMPHOCYTES NFR BLD: 16.5 % (ref 18–48)
MCH RBC QN AUTO: 27 PG (ref 27–31)
MCHC RBC AUTO-ENTMCNC: 34.2 G/DL (ref 32–36)
MCV RBC AUTO: 79 FL (ref 82–98)
MONOCYTES # BLD AUTO: 1.2 K/UL (ref 0.3–1)
MONOCYTES NFR BLD: 9.1 % (ref 4–15)
NEUTROPHILS # BLD AUTO: 9.2 K/UL (ref 1.8–7.7)
NEUTROPHILS NFR BLD: 72.9 % (ref 38–73)
NRBC BLD-RTO: 0 /100 WBC
PLATELET # BLD AUTO: 284 K/UL (ref 150–450)
PMV BLD AUTO: 10.4 FL (ref 9.2–12.9)
RBC # BLD AUTO: 4.08 M/UL (ref 4–5.4)
WBC # BLD AUTO: 12.7 K/UL (ref 3.9–12.7)

## 2022-03-08 PROCEDURE — 99232 PR SUBSEQUENT HOSPITAL CARE,LEVL II: ICD-10-PCS | Mod: ,,, | Performed by: OBSTETRICS & GYNECOLOGY

## 2022-03-08 PROCEDURE — 99232 SBSQ HOSP IP/OBS MODERATE 35: CPT | Mod: ,,, | Performed by: OBSTETRICS & GYNECOLOGY

## 2022-03-08 PROCEDURE — 25000003 PHARM REV CODE 250: Performed by: OBSTETRICS & GYNECOLOGY

## 2022-03-08 PROCEDURE — 11000001 HC ACUTE MED/SURG PRIVATE ROOM

## 2022-03-08 PROCEDURE — 63600175 PHARM REV CODE 636 W HCPCS: Performed by: NURSE PRACTITIONER

## 2022-03-08 PROCEDURE — 85025 COMPLETE CBC W/AUTO DIFF WBC: CPT | Performed by: OBSTETRICS & GYNECOLOGY

## 2022-03-08 PROCEDURE — 94761 N-INVAS EAR/PLS OXIMETRY MLT: CPT

## 2022-03-08 PROCEDURE — 36415 COLL VENOUS BLD VENIPUNCTURE: CPT | Performed by: OBSTETRICS & GYNECOLOGY

## 2022-03-08 RX ADMIN — OXYCODONE HYDROCHLORIDE AND ACETAMINOPHEN 1 TABLET: 5; 325 TABLET ORAL at 11:03

## 2022-03-08 RX ADMIN — PRENATAL VIT W/ FE FUMARATE-FA TAB 27-0.8 MG 1 TABLET: 27-0.8 TAB at 09:03

## 2022-03-08 RX ADMIN — CEFTRIAXONE 1 G: 1 INJECTION, SOLUTION INTRAVENOUS at 09:03

## 2022-03-08 RX ADMIN — OXYCODONE HYDROCHLORIDE AND ACETAMINOPHEN 1 TABLET: 5; 325 TABLET ORAL at 10:03

## 2022-03-08 NOTE — H&P
HISTORY AND PHYSICAL  ANTEPARTUM                                                       Subjective:       Erika Vasquez is a 31 y.o.  female with IUP at 13w3d measured by ultrasound  who is being admitted for acute pyelonephritis   Back pain, dysuria , fever  On ED evaluation was found with (+) right CVA tenderness   Also elevated WBC   Started on IV antibiotics on admission ( ceftriaxone  1 gra IV q 24 hs)  Admitted for treatment with IV antibiotics and pain control   Denies vaginal bleeding , reports nausea and vomiting         PMHx: No past medical history on file.     PSHx:         Past Surgical History:   Procedure Laterality Date    GALLBLADDER SURGERY             All: Review of patient's allergies indicates:  No Known Allergies     Meds:   Prescriptions Prior to Admission           Medications Prior to Admission   Medication Sig Dispense Refill Last Dose    aspirin (ECOTRIN) 81 MG EC tablet Take 1 tablet (81 mg total) by mouth once daily. 150 tablet 2      ondansetron (ZOFRAN-ODT) 4 MG TbDL Take 1 tablet (4 mg total) by mouth every 6 (six) hours as needed (Nausea). 10 tablet 0      ondansetron (ZOFRAN-ODT) 4 MG TbDL Take 1 tablet (4 mg total) by mouth every 8 (eight) hours as needed (nausea). 12 tablet 0      prenatal vit,giorgi 74-iron-folic 27 mg iron- 1 mg Tab Take 1 tablet by mouth once daily. 90 tablet 3      promethazine (PHENERGAN) 25 MG tablet Take 1 tablet (25 mg total) by mouth every 6 (six) hours as needed for Nausea. 25 tablet 0      pyridoxine, vitamin B6, (B-6) 100 MG Tab Take 1 tablet (100 mg total) by mouth 3 (three) times daily before meals. 60 tablet 3              SH:   Social History               Socioeconomic History    Marital status: Unknown   Tobacco Use    Smoking status: Never Smoker    Smokeless tobacco: Never Used   Substance and Sexual Activity    Alcohol use: Not Currently    Drug use: Never    Sexual activity: Yes       Partners: Male             FH:         Family History   Problem Relation Age of Onset    Hypertension Father      Diabetes Father           OBHx:                    OB History    Para Term  AB Living   2 1 1 0 0 1   SAB IAB Ectopic Multiple Live Births      0 0 0 0 1          # Outcome Date GA Lbr Ken/2nd Weight Sex Delivery Anes PTL Lv   2 Current                     1 Term   38w3d       CS-Unspec     BOBBY      Complications: Pre-eclampsia         Objective:       /60 (BP Location: Left arm, Patient Position: Lying)   Pulse 85   Temp 98.1 °F (36.7 °C) (Oral)   Resp 16   LMP 2021 (Approximate)   SpO2 100%      Vitals          Vitals:     22 1657 22 1757 22   BP: (!) 101/59 (!) 115/57   112/60   BP Location: Right arm Left arm   Left arm   Patient Position: Sitting Lying   Lying   Pulse:   85   85   Resp:   16   16   Temp:   98.2 °F (36.8 °C)   98.1 °F (36.7 °C)   TempSrc:   Oral   Oral   SpO2:   97% 100% 100%                   General:   alert, appears stated age, cooperative and no distress    Lungs:   clear to auscultation bilaterally    Heart:   regular rate and rhythm, S1, S2 normal, no murmur, click, rub or gallop    Abdomen:  soft, non-tender; bowel sounds normal; no masses,  no organomegaly    Extremities negative edema, negative erythema                                                                      Lab Review  Blood Type O POS         Lab Results   Component Value Date     WBC 13.64 (H) 2022     HGB 11.2 (L) 2022     HCT 33.2 (L) 2022     MCV 80 (L) 2022      2022      Renal US 3/6/22)\  FINDINGS:  Right kidney: The right kidney measures 12.7 cm. No cortical thinning. No loss of corticomedullary distinction. Resistive index measures 0.62.  No mass. No renal stone. No hydronephrosis.     Left kidney: The left kidney measures 12.8 cm. No cortical thinning. No loss of corticomedullary distinction. Resistive index  measures 0.5.  No mass. No renal stone. No hydronephrosis.     The hepatic parenchyma is somewhat echogenic, possibly reflecting steatosis.     Impression:     No acute sonographic abnormalities of the kidneys or bladder.     Urine culture preliminary:  Gram Negative rods     Sensitivities pending      Assessment:        13w3d weeks gestation presents for acute pyelonephritis               Plan:       Risks, benefits, alternatives and possible complications have been discussed in detail with the patient.   - Consents signed and to chart  - Admit to  antefloor  Ceftriaxone 1 grm IV q 24 hs  Tylenol PRN fever  Percocet PRN pain   PNV vitamins q day   CBC in the  am                Barber Moya M.D.   OB/GYN    3/7/2022

## 2022-03-08 NOTE — H&P
HISTORY AND PHYSICAL  ANTEPARTUM          Subjective:       Erika Vasquez is a 31 y.o.  female with IUP at 13w3d measured by ultrasound  who is being admitted for acute pyelonephritis   Back pain, dysuria , fever  On ED evaluation was found with (+) right CVA tenderness   Also elevated WBC   Started on IV antibiotics on admission ( ceftriaxone  1 gra IV q 24 hs)  Admitted for treatment with IV antibiotics and pain control   Denies vaginal bleeding , reports nausea and vomiting       PMHx: No past medical history on file.    PSHx:   Past Surgical History:   Procedure Laterality Date    GALLBLADDER SURGERY         All: Review of patient's allergies indicates:  No Known Allergies    Meds:   Medications Prior to Admission   Medication Sig Dispense Refill Last Dose    aspirin (ECOTRIN) 81 MG EC tablet Take 1 tablet (81 mg total) by mouth once daily. 150 tablet 2     ondansetron (ZOFRAN-ODT) 4 MG TbDL Take 1 tablet (4 mg total) by mouth every 6 (six) hours as needed (Nausea). 10 tablet 0     ondansetron (ZOFRAN-ODT) 4 MG TbDL Take 1 tablet (4 mg total) by mouth every 8 (eight) hours as needed (nausea). 12 tablet 0     prenatal vit,giorgi 74-iron-folic 27 mg iron- 1 mg Tab Take 1 tablet by mouth once daily. 90 tablet 3     promethazine (PHENERGAN) 25 MG tablet Take 1 tablet (25 mg total) by mouth every 6 (six) hours as needed for Nausea. 25 tablet 0     pyridoxine, vitamin B6, (B-6) 100 MG Tab Take 1 tablet (100 mg total) by mouth 3 (three) times daily before meals. 60 tablet 3        SH:   Social History     Socioeconomic History    Marital status: Unknown   Tobacco Use    Smoking status: Never Smoker    Smokeless tobacco: Never Used   Substance and Sexual Activity    Alcohol use: Not Currently    Drug use: Never    Sexual activity: Yes     Partners: Male       FH:   Family History   Problem Relation Age of Onset    Hypertension Father     Diabetes Father        OBHx:   OB History     Para Term  AB Living   2 1 1 0 0 1   SAB IAB Ectopic Multiple Live Births   0 0 0 0 1      # Outcome Date GA Lbr Ken/2nd Weight Sex Delivery Anes PTL Lv   2 Current            1 Term  38w3d    CS-Unspec   BOBBY      Complications: Pre-eclampsia       Objective:       /60 (BP Location: Left arm, Patient Position: Lying)   Pulse 85   Temp 98.1 °F (36.7 °C) (Oral)   Resp 16   LMP 2021 (Approximate)   SpO2 100%     Vitals:    22 1657 22 1757 22   BP: (!) 101/59 (!) 115/57  112/60   BP Location: Right arm Left arm  Left arm   Patient Position: Sitting Lying  Lying   Pulse:  85  85   Resp:  16  16   Temp:  98.2 °F (36.8 °C)  98.1 °F (36.7 °C)   TempSrc:  Oral  Oral   SpO2:  97% 100% 100%       General:   alert, appears stated age, cooperative and no distress   Lungs:   clear to auscultation bilaterally   Heart:   regular rate and rhythm, S1, S2 normal, no murmur, click, rub or gallop   Abdomen:  soft, non-tender; bowel sounds normal; no masses,  no organomegaly   Extremities negative edema, negative erythema                                              Lab Review  Blood Type O POS   Lab Results   Component Value Date    WBC 13.64 (H) 2022    HGB 11.2 (L) 2022    HCT 33.2 (L) 2022    MCV 80 (L) 2022     2022     Renal US 3/6/22)\  FINDINGS:  Right kidney: The right kidney measures 12.7 cm. No cortical thinning. No loss of corticomedullary distinction. Resistive index measures 0.62.  No mass. No renal stone. No hydronephrosis.     Left kidney: The left kidney measures 12.8 cm. No cortical thinning. No loss of corticomedullary distinction. Resistive index measures 0.5.  No mass. No renal stone. No hydronephrosis.     The hepatic parenchyma is somewhat echogenic, possibly reflecting steatosis.     Impression:     No acute sonographic abnormalities of the kidneys or bladder.    Urine culture preliminary:  Gram Negative  rods     Sensitivities pending     Assessment:       13w3d weeks gestation presents for acute pyelonephritis            Plan:      Risks, benefits, alternatives and possible complications have been discussed in detail with the patient.   - Consents signed and to chart  - Admit to  antefloor  Ceftriaxone 1 grm IV q 24 hs  Tylenol PRN fever  Percocet PRN pain   PNV vitamins q day   CBC in the               Barber Moya M.D.   OB/GYN    3/7/2022

## 2022-03-08 NOTE — PROGRESS NOTES
OBSTERICS & GYNECOLOGY   ANTEPARTUM    Admit Date: 3/6/2022   LOS: 3days     Reason for Admission:  14 weeks gestation of pregnancy    SUBJECTIVE:     Erika Vasquez is a 31 y.o. female at 13w4d who is here for Acute pyelonephritis    -  Pain significantly improved   Tolerating PO< no nausea or vomiting  no fever since admission       Scheduled Meds:   cefTRIAXone (ROCEPHIN) IVPB  1 g Intravenous Q24H    prenatal vitamin  1 tablet Oral Daily     Continuous Infusions:  PRN Meds:acetaminophen, HYDROmorphone, ondansetron, oxyCODONE-acetaminophen, promethazine, sodium chloride 0.9%    Review of patient's allergies indicates:  No Known Allergies    OBJECTIVE:     Vital Signs (Most Recent)  Temp: 98.1 °F (36.7 °C) (03/09/22 0800)  Pulse: 78 (03/09/22 0800)  Resp: 18 (03/09/22 0800)  BP: 120/72 (03/09/22 0800)  SpO2: 97 % (03/09/22 0502)    Temperature Range Min/Max (Last 24H):  Temp:  [98.1 °F (36.7 °C)-98.8 °F (37.1 °C)]     Vital Signs Range (Last 24H):  Temp:  [98.1 °F (36.7 °C)-98.8 °F (37.1 °C)]   Pulse:  [65-78]   Resp:  [16-20]   BP: ()/(54-72)   SpO2:  [97 %-100 %]     I & O (Last 24H):No intake or output data in the 24 hours ending 03/09/22 1335    Physical Exam:  General: well developed, well nourished, no distress  Lungs:  clear to auscultation bilaterally and normal respiratory effort  Heart: regular rate and rhythm, S1, S2 normal, no murmur, click, rub or gallop  Abdomen: soft, non-tender non-distented; bowel sounds normal; no masses,  no organomegaly Positive   NO CVA tenderness noted today     Extremities: no cyanosis or edema, or clubbing              Laboratory:  CBC:   Recent Labs   Lab 03/08/22  0120   WBC 12.70   RBC 4.08   HGB 11.0*   HCT 32.2*      MCV 79*   MCH 27.0   MCHC 34.2     Urine culture+ E coli , sensitive to cephalosporins      ASSESSMENT/PLAN:     Active Hospital Problems    Diagnosis  POA    *14 weeks gestation of pregnancy [Z3A.14]  Not Applicable       Resolved Hospital Problems   No resolved problems to display.       Assessment:   31 y.o.female  at 13w4d HD#3 for acute pyelonephritis     condition improving   No fever [id number]  WBC is normal     Plan:  Discharge patient home today on PO  Keflex for 10 days   Will need Macrodantin until until delivery   Prescriptions sent to pharmacy     Follow up in one week         Barber Moya M.D.   OB/GYN    3/9/2022

## 2022-03-08 NOTE — PROGRESS NOTES
MD ordered for fetal heart tones to be check once a day while patient is admitted. HEMANT Vergara from L&D at the bedside with portable doppler and states FHTs are 160 bpm at this time. Will continue to monitor.

## 2022-03-08 NOTE — PLAN OF CARE
Plan of care reviewed with patient. Patient verbalized understanding. Pt awake and oriented x3. Tolerating regular diet. Pain management discussed and maintained; patient reports pain controlled with ordered medications. Bed in low position, side rails up x2, call light within reach, patient safety maintained.  VSS. NAD noted. Will continue to monitor.

## 2022-03-08 NOTE — PLAN OF CARE
SW completed discharge planning assessment with patient telephonically. Pt was alert and oriented during time of call. Pt resides in the home with family and family will provide assistance as needed following discharge. Pt reported difficulties with transportation and at time of assessment reported she is unsure of transportation at discharge. Pt is not having difficulties affording medication and no needs for community resources were reported. Pt was encouraged to call SW with any questions or concerns. Pt verbalized understanding. SW will continue to follow pt's transitions of care.          03/08/22 1241   Discharge Assessment   Assessment Type Discharge Planning Assessment   Confirmed/corrected address, phone number and insurance Yes   Confirmed Demographics Correct on Facesheet   Source of Information patient; utilized  (Sheri ID# [id number])   When was your last doctors appointment? 02/10/22   Communicated AICHA with patient/caregiver Date not available/Unable to determine   Reason For Admission Pyelonephritis   Lives With   (pt reported living with family)   Facility Arrived From: Home   Do you expect to return to your current living situation? Yes   Do you have help at home or someone to help you manage your care at home? Yes   Who are your caregiver(s) and their phone number(s)?   (Pt reported family will provide assistance if needed)   Prior to hospitilization cognitive status: Alert/Oriented   Current cognitive status: Alert/Oriented   Walking or Climbing Stairs Difficulty none   Dressing/Bathing Difficulty none   Home Accessibility wheelchair accessible   Home Layout Able to live on 1st floor   Equipment Currently Used at Home none   Do you take prescription medications? Yes   Do you have prescription coverage? Yes   Coverage [employer] [facility] ( Medicaid)   Do you have any problems affording any of your prescribed medications? No  (Pt reported obtaining RX affordably at [facility]  pharmacy)   Is the patient taking medications as prescribed? yes   Are you on dialysis? No   Do you take coumadin? No   Discharge Plan A Home with family   Discharge Barriers Identified None

## 2022-03-08 NOTE — PROGRESS NOTES
OBSTERICS & GYNECOLOGY   ANTEPARTUM    Admit Date: 3/6/2022   LOS: 1 day     Reason for Admission:  <principal problem not specified>    SUBJECTIVE:     Erika RivasHodan is a 31 y.o. female at 13w4d who is here for Acute pyelonephritis    -  Pain is improving but still needs PO pain meds   Tolerating PO< no nausea or vomiting  Refers no fever since admission       Scheduled Meds:   cefTRIAXone (ROCEPHIN) IVPB  1 g Intravenous Q24H    prenatal vitamin  1 tablet Oral Daily     Continuous Infusions:  PRN Meds:acetaminophen, HYDROmorphone, ondansetron, oxyCODONE-acetaminophen, promethazine, sodium chloride 0.9%    Review of patient's allergies indicates:  No Known Allergies    OBJECTIVE:     Vital Signs (Most Recent)  Temp: 98.3 °F (36.8 °C) (22)  Pulse: 96 (22)  Resp: 17 (22)  BP: 111/67 (22)  SpO2: 99 % (22)    Temperature Range Min/Max (Last 24H):  Temp:  [97.8 °F (36.6 °C)-98.3 °F (36.8 °C)]     Vital Signs Range (Last 24H):  Temp:  [97.8 °F (36.6 °C)-98.3 °F (36.8 °C)]   Pulse:  [81-98]   Resp:  [16-20]   BP: ()/(53-67)   SpO2:  [97 %-100 %]     I & O (Last 24H):No intake or output data in the 24 hours ending 22 1152    Physical Exam:  General: well developed, well nourished, no distress  Lungs:  clear to auscultation bilaterally and normal respiratory effort  Heart: regular rate and rhythm, S1, S2 normal, no murmur, click, rub or gallop  Abdomen: soft, non-tender non-distented; bowel sounds normal; no masses,  no organomegaly Positive   CVA tenderness on the right side     Extremities: no cyanosis or edema, or clubbing              Laboratory:  CBC:   Recent Labs   Lab 22  0120   WBC 12.70   RBC 4.08   HGB 11.0*   HCT 32.2*      MCV 79*   MCH 27.0   MCHC 34.2         ASSESSMENT/PLAN:     There are no hospital problems to display for this patient.      Assessment:   31 y.o.female  at 13w4d HD#2 for acute  pyelonephritis     condition improving   No fever last 24 hs  WBC is trending down       Plan:  Continue IV antibiotics  (cefriaxone 1 gr IV /24h)   Pain control PNV   Fetal heart tones q day           Barber Moya M.D.   OB/GYN    3/8/2022

## 2022-03-09 VITALS
BODY MASS INDEX: 40.55 KG/M2 | HEIGHT: 63 IN | DIASTOLIC BLOOD PRESSURE: 72 MMHG | OXYGEN SATURATION: 97 % | RESPIRATION RATE: 18 BRPM | TEMPERATURE: 98 F | HEART RATE: 78 BPM | SYSTOLIC BLOOD PRESSURE: 120 MMHG

## 2022-03-09 PROBLEM — Z3A.14 14 WEEKS GESTATION OF PREGNANCY: Status: ACTIVE | Noted: 2022-03-09

## 2022-03-09 LAB — BACTERIA UR CULT: ABNORMAL

## 2022-03-09 PROCEDURE — 63600175 PHARM REV CODE 636 W HCPCS: Performed by: NURSE PRACTITIONER

## 2022-03-09 PROCEDURE — 99232 SBSQ HOSP IP/OBS MODERATE 35: CPT | Mod: ,,, | Performed by: OBSTETRICS & GYNECOLOGY

## 2022-03-09 PROCEDURE — 99232 PR SUBSEQUENT HOSPITAL CARE,LEVL II: ICD-10-PCS | Mod: ,,, | Performed by: OBSTETRICS & GYNECOLOGY

## 2022-03-09 PROCEDURE — 25000003 PHARM REV CODE 250: Performed by: OBSTETRICS & GYNECOLOGY

## 2022-03-09 RX ORDER — CEPHALEXIN 250 MG/1
250 CAPSULE ORAL 4 TIMES DAILY
Qty: 28 CAPSULE | Refills: 0 | Status: SHIPPED | OUTPATIENT
Start: 2022-03-09 | End: 2022-03-16

## 2022-03-09 RX ADMIN — PRENATAL VIT W/ FE FUMARATE-FA TAB 27-0.8 MG 1 TABLET: 27-0.8 TAB at 09:03

## 2022-03-09 RX ADMIN — CEFTRIAXONE 1 G: 1 INJECTION, SOLUTION INTRAVENOUS at 09:03

## 2022-03-09 NOTE — PROGRESS NOTES
[facility]  Discharge Summary  Obstetrics - Antepartum      Admit Date: 3/6/2022    Discharge Date and Time:  03/09/2022 1:41 PM    Discharge Attending Physician: No att. providers found     Discharge Provider: Barber Moya    Reason for Admission: Pyelonephritis    Procedures Performed: * No surgery found *    Hospital Course   Admitted for IV antibiotics and pain control  Renal US normal   Heart tones verified  Daily   Discharged from hospital once pain improved, after 48 hs afebrile  On PO abx for 10 drays    FU @ the clinic in 1 week     Consults: none    Significant Diagnostic Studies: Labs:   CMP No results for input(s): NA, K, CL, CO2, GLU, BUN, CREATININE, CALCIUM, PROT, ALBUMIN, BILITOT, ALKPHOS, AST, ALT, ANIONGAP, ESTGFRAFRICA, EGFRNONAA in the last 48 hours. and CBC   Recent Labs   Lab 03/08/22  0120   WBC 12.70   HGB 11.0*   HCT 32.2*          Final Diagnoses:   Principal Problem: 14 weeks gestation of pregnancy   Secondary Diagnoses:   Active Hospital Problems    Diagnosis  POA    *14 weeks gestation of pregnancy [Z3A.14]  Not Applicable      Resolved Hospital Problems   No resolved problems to display.       Discharged Condition: good    Disposition: Home or Self Care    Follow Up/Patient Instructions:     Medications:  Reconciled Home Medications:      Medication List      START taking these medications    cephALEXin 250 MG capsule  Commonly known as: KEFLEX  [place] 1 cápsula (250 mg en total) por vía oral 4 (cuatro) veces al día por 7 días  (Take 1 capsule (250 mg total) by mouth 4 (four) times daily. for 7 days)        ASK your doctor about these medications    aspirin 81 MG EC tablet  Commonly known as: ECOTRIN  Take 1 tablet (81 mg total) by mouth once daily.     * ondansetron 4 MG Tbdl  Commonly known as: ZOFRAN-ODT  Take 1 tablet (4 mg total) by mouth every 6 (six) hours as needed (Nausea).     * ondansetron 4 MG Tbdl  Commonly known as: ZOFRAN-ODT  Take 1 tablet (4 mg  total) by mouth every 8 (eight) hours as needed (nausea).     prenatal vit,giorgi 74-iron-folic 27 mg iron- 1 mg Tab  Take 1 tablet by mouth once daily.     promethazine 25 MG tablet  Commonly known as: PHENERGAN  Take 1 tablet (25 mg total) by mouth every 6 (six) hours as needed for Nausea.     pyridoxine (vitamin B6) 100 MG Tab  Commonly known as: B-6  Take 1 tablet (100 mg total) by mouth 3 (three) times daily before meals.         * This list has 2 medication(s) that are the same as other medications prescribed for you. Read the directions carefully, and ask your doctor or other care provider to review them with you.              Discharge Procedure Orders   Diet Adult Regular     Notify your health care provider if you experience any of the following:  temperature >100.4     Notify your health care provider if you experience any of the following:  persistent nausea and vomiting or diarrhea     Notify your health care provider if you experience any of the following:  severe uncontrolled pain     Activity as tolerated      Follow-up Information     [facility] Emergency Dept Follow up in 1 week(s).    Specialty: Emergency Medicine  Contact information:  [address]  [facility] [address] [phone]                       Total time spent discharging patient: 25 Minutes  Barber Moya M.D.   OB/GYN    3/9/2022

## 2022-03-09 NOTE — NURSING
Discharge instructions given to patient verbally via Language Line , Lavinia # [id number],and in writing. Verbalized understanding.  Prescriptions given with explanation for use. Verbalized understanding.

## 2022-03-09 NOTE — NURSING
Attempted to get fetal heart tones via Doppler. Unable to find. Called HEMANT Seymour, from L&D- also unable to find. Dr. Moya entered room at this time and asked for ultrasound machine. Fetus observed by Dr. Moya moving within uterus. NAD observed.

## 2022-03-09 NOTE — DISCHARGE SUMMARY
[facility]  Discharge Summary  Obstetrics - Antepartum        Admit Date: 3/6/2022     Discharge Date and Time:  03/09/2022 1:41 PM     Discharge Attending Physician: No att. providers found      Discharge Provider: Barber Moya     Reason for Admission: Pyelonephritis     Procedures Performed: * No surgery found *     Hospital Course   Admitted for IV antibiotics and pain control  Renal US normal   Heart tones verified  Daily   Discharged from hospital once pain improved, after 48 hs afebrile  On PO abx for 10 drays     FU @ the clinic in 1 week      Consults: none     Significant Diagnostic Studies: Labs:   CMP No results for input(s): NA, K, CL, CO2, GLU, BUN, CREATININE, CALCIUM, PROT, ALBUMIN, BILITOT, ALKPHOS, AST, ALT, ANIONGAP, ESTGFRAFRICA, EGFRNONAA in the last 48 hours. and CBC       Recent Labs   Lab 03/08/22  0120   WBC 12.70   HGB 11.0*   HCT 32.2*            Final Diagnoses:              Principal Problem: 14 weeks gestation of pregnancy              Secondary Diagnoses:         Active Hospital Problems     Diagnosis   POA    *14 weeks gestation of pregnancy [Z3A.14]   Not Applicable       Resolved Hospital Problems   No resolved problems to display.         Discharged Condition: good     Disposition: Home or Self Care     Follow Up/Patient Instructions:      Medications:  Reconciled Home Medications:       Medication List       START taking these medications    cephALEXin 250 MG capsule  Commonly known as: KEFLEX  [place] 1 cápsula (250 mg en total) por vía oral 4 (cuatro) veces al día por 7 días  (Take 1 capsule (250 mg total) by mouth 4 (four) times daily. for 7 days)          ASK your doctor about these medications    aspirin 81 MG EC tablet  Commonly known as: ECOTRIN  Take 1 tablet (81 mg total) by mouth once daily.      * ondansetron 4 MG Tbdl  Commonly known as: ZOFRAN-ODT  Take 1 tablet (4 mg total) by mouth every 6 (six) hours as needed (Nausea).      * ondansetron 4 MG  Tbdl  Commonly known as: ZOFRAN-ODT  Take 1 tablet (4 mg total) by mouth every 8 (eight) hours as needed (nausea).      prenatal vit,giorgi 74-iron-folic 27 mg iron- 1 mg Tab  Take 1 tablet by mouth once daily.      promethazine 25 MG tablet  Commonly known as: PHENERGAN  Take 1 tablet (25 mg total) by mouth every 6 (six) hours as needed for Nausea.      pyridoxine (vitamin B6) 100 MG Tab  Commonly known as: B-6  Take 1 tablet (100 mg total) by mouth 3 (three) times daily before meals.           * This list has 2 medication(s) that are the same as other medications prescribed for you. Read the directions carefully, and ask your doctor or other care provider to review them with you.                 Discharge Procedure Orders   Diet Adult Regular      Notify your health care provider if you experience any of the following:  temperature >100.4      Notify your health care provider if you experience any of the following:  persistent nausea and vomiting or diarrhea      Notify your health care provider if you experience any of the following:  severe uncontrolled pain      Activity as tolerated             Follow-up Information           [facility] - Emergency Dept Follow up in 1 week(s).    Specialty: Emergency Medicine  Contact information:  [address]  [facility] [address] [phone]                              Total time spent discharging patient: 25 Minutes  Barber Moya M.D.   OB/GYN    3/9/2022

## 2022-03-31 RX ORDER — PYRIDOXINE HCL (VITAMIN B6) 100 MG
100 TABLET ORAL
Qty: 60 TABLET | Refills: 3 | COMMUNITY
Start: 2022-03-31 | End: 2022-06-07 | Stop reason: SDUPTHER

## 2022-04-14 ENCOUNTER — ROUTINE PRENATAL (OUTPATIENT)
Dept: OBSTETRICS AND GYNECOLOGY | Facility: CLINIC | Age: 32
End: 2022-04-14
Payer: MEDICAID

## 2022-04-14 VITALS
DIASTOLIC BLOOD PRESSURE: 68 MMHG | SYSTOLIC BLOOD PRESSURE: 112 MMHG | BODY MASS INDEX: 40.23 KG/M2 | WEIGHT: 227.06 LBS

## 2022-04-14 DIAGNOSIS — Z3A.18 18 WEEKS GESTATION OF PREGNANCY: ICD-10-CM

## 2022-04-14 DIAGNOSIS — B37.9 YEAST INFECTION: Primary | ICD-10-CM

## 2022-04-14 DIAGNOSIS — Z87.448 HX OF ACUTE PYELONEPHRITIS: ICD-10-CM

## 2022-04-14 DIAGNOSIS — O09.299 HX OF PREECLAMPSIA, PRIOR PREGNANCY, CURRENTLY PREGNANT: ICD-10-CM

## 2022-04-14 PROCEDURE — 99999 PR PBB SHADOW E&M-EST. PATIENT-LVL III: CPT | Mod: PBBFAC,,, | Performed by: OBSTETRICS & GYNECOLOGY

## 2022-04-14 PROCEDURE — 99214 OFFICE O/P EST MOD 30 MIN: CPT | Mod: S$PBB,TH,, | Performed by: OBSTETRICS & GYNECOLOGY

## 2022-04-14 PROCEDURE — 99999 PR PBB SHADOW E&M-EST. PATIENT-LVL III: ICD-10-PCS | Mod: PBBFAC,,, | Performed by: OBSTETRICS & GYNECOLOGY

## 2022-04-14 PROCEDURE — 99213 OFFICE O/P EST LOW 20 MIN: CPT | Mod: PBBFAC,TH,PO | Performed by: OBSTETRICS & GYNECOLOGY

## 2022-04-14 PROCEDURE — 99214 PR OFFICE/OUTPT VISIT, EST, LEVL IV, 30-39 MIN: ICD-10-PCS | Mod: S$PBB,TH,, | Performed by: OBSTETRICS & GYNECOLOGY

## 2022-04-14 RX ORDER — FLUCONAZOLE 150 MG
TABLET ORAL
Qty: 2 TABLET | Refills: 0 | Status: ON HOLD | OUTPATIENT
Start: 2022-04-14 | End: 2022-09-04 | Stop reason: HOSPADM

## 2022-04-14 RX ORDER — TERCONAZOLE 4 MG/G
1 CREAM VAGINAL NIGHTLY
Qty: 1 EACH | Refills: 0 | Status: SHIPPED | OUTPATIENT
Start: 2022-04-14 | End: 2022-04-21

## 2022-04-14 RX ORDER — NITROFURANTOIN 25; 75 MG/1; MG/1
100 CAPSULE ORAL 2 TIMES DAILY
Qty: 14 CAPSULE | Refills: 0 | Status: SHIPPED | OUTPATIENT
Start: 2022-04-14 | End: 2022-04-14

## 2022-04-14 RX ORDER — NITROFURANTOIN (MACROCRYSTALS) 100 MG/1
100 CAPSULE ORAL NIGHTLY
Qty: 60 CAPSULE | Refills: 3 | Status: SHIPPED | OUTPATIENT
Start: 2022-04-14 | End: 2022-06-07 | Stop reason: SDUPTHER

## 2022-04-14 NOTE — PROGRESS NOTES
No complaints today   No nausea or vomiting   Denies vaginal bleeding or cramping     /68   Wt 103 kg (227 lb 1.2 oz)   LMP 12/03/2021 (Approximate)   BMI 40.23 kg/m²     Prenatal labs reviewed  Aneuploidy screen  : offered  Quadscreen = )    Anatomy US at 20 weeks   Consents=  Diabetes screen=  TdaP=  GBBS @34-35W=   Growth US @ 36W  BC=       General Pregnancy  recommendations given  PNV + H2O intake    FU in 2 (-)  4  (x)  weeks     Discussed nonpharmacological pain relief methods for labor, techniques and benefits of effective breastfeeding position and latch, and basic breastfeeding management. Encouraged patient to attend [facility]s Prenatal Breastfeeding Class and to download [facility] My Mychart  mobile aydee if she has not already done so. Patient verbalizes understanding.     I spent a total of 30 minutes on the day of the visit.This includes face to face time and non-face to face time preparing to see the patient (eg, review of tests), Obtaining and/or reviewing separately obtained history, Documenting clinical information in the electronic or other health record, Independently interpreting resultsand communicating results to the patient/family/caregiver, or Care coordination.          Barber Moya M.D.   OB/GYN

## 2022-04-16 ENCOUNTER — LAB VISIT (OUTPATIENT)
Dept: LAB | Facility: HOSPITAL | Age: 32
End: 2022-04-16
Attending: OBSTETRICS & GYNECOLOGY
Payer: MEDICAID

## 2022-04-16 DIAGNOSIS — B37.9 YEAST INFECTION: ICD-10-CM

## 2022-04-16 DIAGNOSIS — Z3A.18 18 WEEKS GESTATION OF PREGNANCY: ICD-10-CM

## 2022-04-16 PROCEDURE — 81511 FTL CGEN ABNOR FOUR ANAL: CPT | Performed by: OBSTETRICS & GYNECOLOGY

## 2022-04-16 PROCEDURE — 36415 COLL VENOUS BLD VENIPUNCTURE: CPT | Performed by: OBSTETRICS & GYNECOLOGY

## 2022-04-22 ENCOUNTER — PROCEDURE VISIT (OUTPATIENT)
Dept: OBSTETRICS AND GYNECOLOGY | Facility: CLINIC | Age: 32
End: 2022-04-22
Payer: MEDICAID

## 2022-04-22 DIAGNOSIS — N91.2 AMENORRHEA: ICD-10-CM

## 2022-04-22 PROCEDURE — 76811 US OB/GYN PROCEDURE (VIEWPOINT): ICD-10-PCS | Mod: 26,S$PBB,, | Performed by: OBSTETRICS & GYNECOLOGY

## 2022-04-22 PROCEDURE — 76811 OB US DETAILED SNGL FETUS: CPT | Mod: PBBFAC,PO | Performed by: OBSTETRICS & GYNECOLOGY

## 2022-04-27 LAB
# FETUSES US: NORMAL
2ND TRIMESTER 4 SCREEN PNL SERPL: NEGATIVE
2ND TRIMESTER 4 SCREEN SERPL-IMP: NORMAL
AFP MOM SERPL: 1.03
AFP SERPL-MCNC: 39.4 NG/ML
AGE AT DELIVERY: 31
B-HCG MOM SERPL: 0.69
B-HCG SERPL-ACNC: 13.3 IU/ML
FET TS 21 RISK FROM MAT AGE: NORMAL
GA (DAYS): 1 D
GA (WEEKS): 19 WK
GA METHOD: NORMAL
IDDM PATIENT QL: NORMAL
INHIBIN A MOM SERPL: 0.8
INHIBIN A SERPL-MCNC: 105.5 PG/ML
SMOKING STATUS FTND: NORMAL
TS 18 RISK FETUS: NORMAL
TS 21 RISK FETUS: NORMAL
U ESTRIOL MOM SERPL: 1.07
U ESTRIOL SERPL-MCNC: 2.02 NG/ML

## 2022-04-29 NOTE — PROGRESS NOTES
Quad Screen resulted:  SCREEN NEGATIVE FOR DOWN SYNDROME, NEURAL TUBE DEFECTS   AND TRISOMY 18     Barber Moya M.D.   OB/GYN

## 2022-05-16 ENCOUNTER — HOSPITAL ENCOUNTER (OUTPATIENT)
Facility: HOSPITAL | Age: 32
Discharge: HOME | End: 2022-05-16
Attending: OBSTETRICS & GYNECOLOGY | Admitting: OBSTETRICS & GYNECOLOGY
Payer: MEDICAID

## 2022-05-16 VITALS
SYSTOLIC BLOOD PRESSURE: 132 MMHG | HEIGHT: 63 IN | WEIGHT: 227 LBS | TEMPERATURE: 98 F | HEART RATE: 74 BPM | BODY MASS INDEX: 40.22 KG/M2 | OXYGEN SATURATION: 98 % | DIASTOLIC BLOOD PRESSURE: 73 MMHG | RESPIRATION RATE: 20 BRPM

## 2022-05-16 DIAGNOSIS — R55 SYNCOPE: ICD-10-CM

## 2022-05-16 DIAGNOSIS — M54.6 ACUTE RIGHT-SIDED THORACIC BACK PAIN: Primary | ICD-10-CM
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
## 2022-05-16 LAB
ALBUMIN SERPL BCP-MCNC: 3.2 G/DL (ref 3.5–5.2)
ALP SERPL-CCNC: 63 U/L (ref 55–135)
ALT SERPL W/O P-5'-P-CCNC: 11 U/L (ref 10–44)
ANION GAP SERPL CALC-SCNC: 11 MMOL/L (ref 8–16)
AST SERPL-CCNC: 19 U/L (ref 10–40)
BASOPHILS # BLD AUTO: 0.05 K/UL (ref 0–0.2)
BASOPHILS NFR BLD: 0.4 % (ref 0–1.9)
BILIRUB SERPL-MCNC: 0.3 MG/DL (ref 0.1–1)
BILIRUB UR QL STRIP: NEGATIVE
BUN SERPL-MCNC: 5 MG/DL (ref 6–20)
CALCIUM SERPL-MCNC: 9.5 MG/DL (ref 8.7–10.5)
CHLORIDE SERPL-SCNC: 107 MMOL/L (ref 95–110)
CLARITY UR: CLEAR
CO2 SERPL-SCNC: 21 MMOL/L (ref 23–29)
COLOR UR: COLORLESS
CREAT SERPL-MCNC: 0.7 MG/DL (ref 0.5–1.4)
DIFFERENTIAL METHOD: ABNORMAL
EOSINOPHIL # BLD AUTO: 0.2 K/UL (ref 0–0.5)
EOSINOPHIL NFR BLD: 1.6 % (ref 0–8)
ERYTHROCYTE [DISTWIDTH] IN BLOOD BY AUTOMATED COUNT: 13.3 % (ref 11.5–14.5)
EST. GFR  (AFRICAN AMERICAN): >60 ML/MIN/1.73 M^2
EST. GFR  (NON AFRICAN AMERICAN): >60 ML/MIN/1.73 M^2
GLUCOSE SERPL-MCNC: 98 MG/DL (ref 70–110)
GLUCOSE UR QL STRIP: NEGATIVE
HCT VFR BLD AUTO: 33 % (ref 37–48.5)
HGB BLD-MCNC: 11.2 G/DL (ref 12–16)
HGB UR QL STRIP: NEGATIVE
IMM GRANULOCYTES # BLD AUTO: 0.05 K/UL (ref 0–0.04)
IMM GRANULOCYTES NFR BLD AUTO: 0.4 % (ref 0–0.5)
KETONES UR QL STRIP: NEGATIVE
LEUKOCYTE ESTERASE UR QL STRIP: NEGATIVE
LYMPHOCYTES # BLD AUTO: 3.3 K/UL (ref 1–4.8)
LYMPHOCYTES NFR BLD: 23.8 % (ref 18–48)
MCH RBC QN AUTO: 27.1 PG (ref 27–31)
MCHC RBC AUTO-ENTMCNC: 33.9 G/DL (ref 32–36)
MCV RBC AUTO: 80 FL (ref 82–98)
MONOCYTES # BLD AUTO: 0.9 K/UL (ref 0.3–1)
MONOCYTES NFR BLD: 6.7 % (ref 4–15)
NEUTROPHILS # BLD AUTO: 9.4 K/UL (ref 1.8–7.7)
NEUTROPHILS NFR BLD: 67.1 % (ref 38–73)
NITRITE UR QL STRIP: NEGATIVE
NRBC BLD-RTO: 0 /100 WBC
PH UR STRIP: 7 [PH] (ref 5–8)
PLATELET # BLD AUTO: 302 K/UL (ref 150–450)
PMV BLD AUTO: 11.1 FL (ref 9.2–12.9)
POCT GLUCOSE: 100 MG/DL (ref 70–110)
POTASSIUM SERPL-SCNC: 3.6 MMOL/L (ref 3.5–5.1)
PROT SERPL-MCNC: 7.1 G/DL (ref 6–8.4)
PROT UR QL STRIP: NEGATIVE
RBC # BLD AUTO: 4.13 M/UL (ref 4–5.4)
SODIUM SERPL-SCNC: 139 MMOL/L (ref 136–145)
SP GR UR STRIP: 1.01 (ref 1–1.03)
TROPONIN I SERPL DL<=0.01 NG/ML-MCNC: <0.006 NG/ML (ref 0–0.03)
URN SPEC COLLECT METH UR: ABNORMAL
UROBILINOGEN UR STRIP-ACNC: NEGATIVE EU/DL
WBC # BLD AUTO: 13.93 K/UL (ref 3.9–12.7)

## 2022-05-16 PROCEDURE — 93005 ELECTROCARDIOGRAM TRACING: CPT

## 2022-05-16 PROCEDURE — 93010 EKG 12-LEAD: ICD-10-PCS | Mod: ,,, | Performed by: INTERNAL MEDICINE

## 2022-05-16 PROCEDURE — 81003 URINALYSIS AUTO W/O SCOPE: CPT | Performed by: EMERGENCY MEDICINE

## 2022-05-16 PROCEDURE — 80053 COMPREHEN METABOLIC PANEL: CPT | Performed by: EMERGENCY MEDICINE

## 2022-05-16 PROCEDURE — 82962 GLUCOSE BLOOD TEST: CPT

## 2022-05-16 PROCEDURE — 99285 EMERGENCY DEPT VISIT HI MDM: CPT | Mod: 25,27

## 2022-05-16 PROCEDURE — 93010 ELECTROCARDIOGRAM REPORT: CPT | Mod: ,,, | Performed by: INTERNAL MEDICINE

## 2022-05-16 PROCEDURE — 25000003 PHARM REV CODE 250: Performed by: EMERGENCY MEDICINE

## 2022-05-16 PROCEDURE — 84484 ASSAY OF TROPONIN QUANT: CPT | Performed by: EMERGENCY MEDICINE

## 2022-05-16 PROCEDURE — 63600175 PHARM REV CODE 636 W HCPCS: Performed by: EMERGENCY MEDICINE

## 2022-05-16 PROCEDURE — 85025 COMPLETE CBC W/AUTO DIFF WBC: CPT | Performed by: EMERGENCY MEDICINE

## 2022-05-16 PROCEDURE — 96374 THER/PROPH/DIAG INJ IV PUSH: CPT

## 2022-05-16 PROCEDURE — 99211 OFF/OP EST MAY X REQ PHY/QHP: CPT

## 2022-05-16 RX ORDER — ONDANSETRON 8 MG/1
8 TABLET, ORALLY DISINTEGRATING ORAL EVERY 8 HOURS PRN
Status: DISCONTINUED | OUTPATIENT
Start: 2022-05-16 | End: 2022-05-16 | Stop reason: HOSPADM

## 2022-05-16 RX ORDER — METOCLOPRAMIDE HYDROCHLORIDE 5 MG/ML
10 INJECTION INTRAMUSCULAR; INTRAVENOUS
Status: COMPLETED | OUTPATIENT
Start: 2022-05-16 | End: 2022-05-16

## 2022-05-16 RX ORDER — ACETAMINOPHEN 325 MG/1
650 TABLET ORAL
Status: COMPLETED | OUTPATIENT
Start: 2022-05-16 | End: 2022-05-16

## 2022-05-16 RX ORDER — ACETAMINOPHEN 500 MG
500 TABLET ORAL EVERY 6 HOURS PRN
Status: DISCONTINUED | OUTPATIENT
Start: 2022-05-16 | End: 2022-05-16 | Stop reason: HOSPADM

## 2022-05-16 RX ORDER — SODIUM CHLORIDE, SODIUM LACTATE, POTASSIUM CHLORIDE, CALCIUM CHLORIDE 600; 310; 30; 20 MG/100ML; MG/100ML; MG/100ML; MG/100ML
INJECTION, SOLUTION INTRAVENOUS CONTINUOUS
Status: DISCONTINUED | OUTPATIENT
Start: 2022-05-16 | End: 2022-05-16 | Stop reason: HOSPADM

## 2022-05-16 RX ORDER — PROCHLORPERAZINE EDISYLATE 5 MG/ML
5 INJECTION INTRAMUSCULAR; INTRAVENOUS EVERY 6 HOURS PRN
Status: DISCONTINUED | OUTPATIENT
Start: 2022-05-16 | End: 2022-05-16 | Stop reason: HOSPADM

## 2022-05-16 RX ADMIN — ACETAMINOPHEN 650 MG: 325 TABLET ORAL at 12:05

## 2022-05-16 RX ADMIN — METOCLOPRAMIDE 10 MG: 5 INJECTION, SOLUTION INTRAMUSCULAR; INTRAVENOUS at 12:05

## 2022-05-16 RX ADMIN — SODIUM CHLORIDE 1000 ML: 0.9 INJECTION, SOLUTION INTRAVENOUS at 12:05

## 2022-05-16 NOTE — ED NOTES
#[id number].  Pt states she got nervous, fainted, and landed on her back.  Complaining of right sided back pain radiating to abdomen.  Pt 24 weeks pregnant.

## 2022-05-16 NOTE — ED NOTES
Patient identifiers for Erika Vasquez checked and correct.  LOC: The patient is awake, alert and aware of environment, the patient is oriented x 3 and speaking appropriately.  APPEARANCE: Patient resting quietly and in no acute distress, patient is clean and well groomed, patient's clothing are properly fastened.  SKIN: The skin is warm and dry, patient has normal skin turgor and moist mucus membranes, skin intact, no breakdown or brusing noted.  MUSKULOSKELETAL: Patient moving all extremities well, no obvious swelling or deformities noted.  RESPIRATORY: Airway is open and patent, respirations are spontaneous, patient has a normal effort and rate.  CARDIAC: Patient has a normal rate and rhythm.  NEUROLOGIC: PERRL, facial expression is symmetrical, bilateral hand grasp equal and even, normal sensation in all extremities when touched with a finger.

## 2022-05-16 NOTE — NURSING
Patient D/C home per MD Ena order. Tele removed, IV access removed and intact. VS stable, patient in no active distress, and no complaints at this time. Patient given AVS and explained to to return to ED if pt experiences vaginal bleeding, leaking and/or abdominal pain. Patient verbalized complete understanding of all discharge instructions and follow up care. Pt and  ambulating off unit.

## 2022-05-16 NOTE — NURSING
Dr. Sutherland to place orders. Pt to be monitored for 1 hour. If UA clear and fetal strip reassuring, pt can be d/c home.    UA obtained in ED. WNL.

## 2022-05-16 NOTE — ED PROVIDER NOTES
Encounter Date: 2022       History     Chief Complaint   Patient presents with    Loss of Consciousness     Patient presents to the ED via  EMS. Patient states she was walking outside to see her brother getting arrested when she had a syncopal episode. Patient states she fell on her back and was witnessed to having hit her head. Patient complains of having pain to the back, right flank area, and left elbow pain. Patient states being pregnant approximately 23 weeks under the care of Dr. Moya.  AB0.     Fall     32-year-old female  approximately 23 weeks gestational age presents emergency department by EMS after a syncopal event.  Patient states she got very anxious and lightheaded and subsequently had a syncopal event, falling backwards.  States the right side of her body, where she fell hurts.  Denies any headache.  Notes some mild nausea.  Describes an aching and throbbing pain to the right side of her back and shoulder.  Pain is constant, worse with certain movements and positions and without alleviating factors.  Denies any vomiting.  Does note some right-sided abdominal pain as well.  Denies any vaginal bleeding or discharge, dysuria, frequency, urgency, or fever.  No other symptoms reported at this time.         Review of patient's allergies indicates:  No Known Allergies  No past medical history on file.  Past Surgical History:   Procedure Laterality Date    GALLBLADDER SURGERY       Family History   Problem Relation Age of Onset    Hypertension Father     Diabetes Father      Social History     Tobacco Use    Smoking status: Never Smoker    Smokeless tobacco: Never Used   Substance Use Topics    Alcohol use: Not Currently    Drug use: Never     Review of Systems   Constitutional: Negative for chills, fatigue and fever.   HENT: Negative for congestion and sore throat.    Eyes: Negative for photophobia and visual disturbance.   Respiratory: Negative for cough and shortness of breath.     Cardiovascular: Negative for palpitations and leg swelling.   Gastrointestinal: Positive for abdominal pain and nausea. Negative for vomiting.   Musculoskeletal: Positive for back pain. Negative for neck pain and neck stiffness.   Neurological: Positive for syncope and light-headedness. Negative for numbness and headaches.       Physical Exam     Initial Vitals [05/16/22 0014]   BP Pulse Resp Temp SpO2   116/76 86 20 98.3 °F (36.8 °C) 97 %      MAP       --         Physical Exam    Nursing note and vitals reviewed.  Constitutional: She appears well-developed and well-nourished. No distress.   HENT:   Head: Normocephalic and atraumatic.   Eyes: Conjunctivae and EOM are normal. Pupils are equal, round, and reactive to light.   Neck: Neck supple. No tracheal deviation present.   Normal range of motion.  Cardiovascular: Normal rate and intact distal pulses.   Pulmonary/Chest: No respiratory distress.   Abdominal: Abdomen is soft. There is abdominal tenderness (Right sided).   Uterus gravid, c/w dates, nontender  There is no rebound and no guarding.   Musculoskeletal:      Cervical back: Normal range of motion and neck supple.           ED Course   Procedures  Labs Reviewed   CBC W/ AUTO DIFFERENTIAL - Abnormal; Notable for the following components:       Result Value    WBC 13.93 (*)     Hemoglobin 11.2 (*)     Hematocrit 33.0 (*)     MCV 80 (*)     Gran # (ANC) 9.4 (*)     Immature Grans (Abs) 0.05 (*)     All other components within normal limits   COMPREHENSIVE METABOLIC PANEL - Abnormal; Notable for the following components:    CO2 21 (*)     BUN 5 (*)     Albumin 3.2 (*)     All other components within normal limits   URINALYSIS - Abnormal; Notable for the following components:    Color, UA Colorless (*)     All other components within normal limits   TROPONIN I   POCT GLUCOSE   POCT GLUCOSE MONITORING CONTINUOUS     EKG Readings: (Independently Interpreted)   Initial Reading: No STEMI. Previous EKG Date: No  prior available for comparison. Rhythm: Normal Sinus Rhythm. Heart Rate: 69. Ectopy: No Ectopy. Conduction: Normal. ST Segments: Normal ST Segments. Axis: Normal.         X-Rays:     Medications   sodium chloride 0.9% bolus 1,000 mL (1,000 mLs Intravenous New Bag 5/16/22 0036)   metoclopramide HCl injection 10 mg (10 mg Intravenous Given 5/16/22 0056)   acetaminophen tablet 650 mg (650 mg Oral Given 5/16/22 0057)     Medical Decision Making:   Initial Assessment:   32-year-old female presents to the emergency department by EMS after a syncopal episode  Differential Diagnosis:   Dehydration, electrolyte dyscrasias, arrhythmia, anemia, fetal injury  Independently Interpreted Test(s):   I have ordered and independently interpreted EKG Reading(s) - see prior notes  Clinical Tests:   Lab Tests: Reviewed       <> Summary of Lab: Benign  ED Management:  Patient given some IV fluid, antiemetic, Tylenol.  Tolerating p.o. without difficulty.  Reports improvement in symptoms.  Vital signs stable.  Informed her of plan to admit to L and D for further evaluation and management.  Given what appears to be a mild closed head injury, I do not feel the risk of CT is worth the benefit of discovering a possible ICH.  Patient has no headache, nor does she have any overt signs of trauma to her head.  Most of her pain is to her back.  Patient is comfortable with plan at this time.                       Clinical Impression:   Final diagnoses:  [R55] Syncope  [M54.6] Acute right-sided thoracic back pain (Primary)  [W19.XXXA] Fall, initial encounter          ED Disposition Condition    Send to L&D               Leobardo Byrd MD  05/16/22 4166

## 2022-05-16 NOTE — NURSING
32 year old G 2 P 1 at 23.3 wks with c/o pain radiating from her right back to the right side of abdomen. Pt states it is constant and has decreased from 8/10 to a current 4/10.  History/complications of previous c/s in [facility] and pre- with first pregnancy. Denies vaginal bleeding, Denies leaking fluid. Fetus: fetal heart tones 150s, + fetal movements. Contractions none noted. Abdomen soft/nontender. Vital signs WNL Cervix: not assessed .Educated on electronic fetal monitoring and assessments. Questions answered. Will update Dr. Sutherland.

## 2022-05-16 NOTE — DISCHARGE INSTRUCTIONS
Por favor, asegúrese de beber muchos líquidos. Recomiendo christiana Tylenol 650 mg cada 8 horas según sea necesario para el dolor. Llame a [facility] por la mañana para didier eldon de seguimiento para didier evaluación y manejo adicionales. Regrese con cualquier síntoma nuevo o que empeore.    Follow Labor Precautions:  Call MD or return to Labor and Delivery if...    Labor (after 36 weeks)  - Painful contractions every 5 minutes for 2 hours that do not go away with 2 bottles of water, 2 tylenol, and rest.      Labor (before 36 weeks)  - More than 4 contractions in 1 hour   -First try 2 bottles of water, rest, and 2 tylenol.... If the contractions do not go away call doctors office or after hours clinic first and/or come to hospital for evaluation.      Water Breaks  - Gush or leaking of fluid from vagina, or if unsure.     Vaginal bleeding  - Bright red bleeding like a period, soaking a pad in 1 hour.    Decreased or No fetal movement  - You should feel 10 movements within two hours.  -If you are not feeling the baby move.... Drink a glass of orange juice or apple juice  - If you DO NOT feel 10 movements within two hours, please  call the MD or come to the hospital.    Unable to keep fluids or food down for more than 24 hours    Blurred vision, spots before your eyes, dizziness, headache that does not get better with Tylenol (Acetaminophen), bad swelling, chest pain, or trouble breathing.    Drink 10-12 glasses of water daily  Take medications as prescribed  Keep all follow-up appointments

## 2022-05-25 ENCOUNTER — ROUTINE PRENATAL (OUTPATIENT)
Dept: OBSTETRICS AND GYNECOLOGY | Facility: CLINIC | Age: 32
End: 2022-05-25
Payer: MEDICAID

## 2022-05-25 VITALS — WEIGHT: 229.5 LBS | BODY MASS INDEX: 40.66 KG/M2 | SYSTOLIC BLOOD PRESSURE: 110 MMHG | DIASTOLIC BLOOD PRESSURE: 60 MMHG

## 2022-05-25 DIAGNOSIS — Z3A.28 28 WEEKS GESTATION OF PREGNANCY: Primary | ICD-10-CM

## 2022-05-25 DIAGNOSIS — Z34.83 ENCOUNTER FOR SUPERVISION OF OTHER NORMAL PREGNANCY IN THIRD TRIMESTER: ICD-10-CM

## 2022-05-25 PROCEDURE — 99213 PR OFFICE/OUTPT VISIT, EST, LEVL III, 20-29 MIN: ICD-10-PCS | Mod: S$PBB,TH,, | Performed by: OBSTETRICS & GYNECOLOGY

## 2022-05-25 PROCEDURE — 99213 OFFICE O/P EST LOW 20 MIN: CPT | Mod: PBBFAC,TH,PO | Performed by: OBSTETRICS & GYNECOLOGY

## 2022-05-25 PROCEDURE — 99213 OFFICE O/P EST LOW 20 MIN: CPT | Mod: S$PBB,TH,, | Performed by: OBSTETRICS & GYNECOLOGY

## 2022-05-25 PROCEDURE — 99999 PR PBB SHADOW E&M-EST. PATIENT-LVL III: ICD-10-PCS | Mod: PBBFAC,,, | Performed by: OBSTETRICS & GYNECOLOGY

## 2022-05-25 PROCEDURE — 99999 PR PBB SHADOW E&M-EST. PATIENT-LVL III: CPT | Mod: PBBFAC,,, | Performed by: OBSTETRICS & GYNECOLOGY

## 2022-05-25 NOTE — PROGRESS NOTES
No complaints today   No nausea or vomiting   Denies vaginal bleeding or cramping     /60   Wt 104.1 kg (229 lb 8 oz)   LMP 12/03/2021 (Approximate)   BMI 40.66 kg/m²     Prenatal labs reviewed  Aneuploidy screen  : offered  Quadscreen = )    Anatomy US at 20 weeks   Consents=  Diabetes screen=  TdaP=  GBBS @34-35W=   Growth US @ 36W  BC=       General Pregnancy  recommendations given  PNV + H2O intake    FU in 2 (-)  4  (x)  weeks     Discussed nonpharmacological pain relief methods for labor, techniques and benefits of effective breastfeeding position and latch, and basic breastfeeding management. Encouraged patient to attend [facility] Prenatal Breastfeeding [facility] and to download [facility]  mobile aydee if she has not already done so. Patient verbalizes understanding.     I spent a total of 30 minutes on the day of the visit.This includes face to face time and non-face to face time preparing to see the patient (eg, review of tests), Obtaining and/or reviewing separately obtained history, Documenting clinical information in the electronic or other health record, Independently interpreting resultsand communicating results to the patient/family/caregiver, or Care coordination.          Barber Moya M.D.   OB/GYN

## 2022-05-27 ENCOUNTER — PATIENT MESSAGE (OUTPATIENT)
Dept: ADMINISTRATIVE | Facility: OTHER | Age: 32
End: 2022-05-27
Payer: MEDICAID

## 2022-06-22 ENCOUNTER — LAB VISIT (OUTPATIENT)
Dept: LAB | Facility: HOSPITAL | Age: 32
End: 2022-06-22
Attending: OBSTETRICS & GYNECOLOGY
Payer: MEDICAID

## 2022-06-22 ENCOUNTER — CLINICAL SUPPORT (OUTPATIENT)
Dept: OBSTETRICS AND GYNECOLOGY | Facility: CLINIC | Age: 32
End: 2022-06-22
Payer: MEDICAID

## 2022-06-22 ENCOUNTER — ROUTINE PRENATAL (OUTPATIENT)
Dept: OBSTETRICS AND GYNECOLOGY | Facility: CLINIC | Age: 32
End: 2022-06-22
Payer: MEDICAID

## 2022-06-22 VITALS
BODY MASS INDEX: 41.48 KG/M2 | DIASTOLIC BLOOD PRESSURE: 52 MMHG | SYSTOLIC BLOOD PRESSURE: 110 MMHG | WEIGHT: 234.13 LBS

## 2022-06-22 DIAGNOSIS — Z34.83 ENCOUNTER FOR SUPERVISION OF OTHER NORMAL PREGNANCY IN THIRD TRIMESTER: ICD-10-CM

## 2022-06-22 DIAGNOSIS — Z23 NEED FOR TDAP VACCINATION: Primary | ICD-10-CM

## 2022-06-22 DIAGNOSIS — Z3A.28 28 WEEKS GESTATION OF PREGNANCY: ICD-10-CM

## 2022-06-22 DIAGNOSIS — Z3A.28 28 WEEKS GESTATION OF PREGNANCY: Primary | ICD-10-CM

## 2022-06-22 LAB — GLUCOSE SERPL-MCNC: 164 MG/DL (ref 70–140)

## 2022-06-22 PROCEDURE — 82950 GLUCOSE TEST: CPT | Performed by: OBSTETRICS & GYNECOLOGY

## 2022-06-22 PROCEDURE — 99213 PR OFFICE/OUTPT VISIT, EST, LEVL III, 20-29 MIN: ICD-10-PCS | Mod: S$PBB,TH,, | Performed by: OBSTETRICS & GYNECOLOGY

## 2022-06-22 PROCEDURE — 99213 OFFICE O/P EST LOW 20 MIN: CPT | Mod: S$PBB,TH,, | Performed by: OBSTETRICS & GYNECOLOGY

## 2022-06-22 PROCEDURE — 36415 COLL VENOUS BLD VENIPUNCTURE: CPT | Performed by: OBSTETRICS & GYNECOLOGY

## 2022-06-22 PROCEDURE — 99999 PR PBB SHADOW E&M-EST. PATIENT-LVL III: CPT | Mod: PBBFAC,,, | Performed by: OBSTETRICS & GYNECOLOGY

## 2022-06-22 PROCEDURE — 90715 TDAP VACCINE 7 YRS/> IM: CPT | Mod: PBBFAC,PO

## 2022-06-22 PROCEDURE — 99213 OFFICE O/P EST LOW 20 MIN: CPT | Mod: PBBFAC,TH,PO | Performed by: OBSTETRICS & GYNECOLOGY

## 2022-06-22 PROCEDURE — 99999 PR PBB SHADOW E&M-EST. PATIENT-LVL III: ICD-10-PCS | Mod: PBBFAC,,, | Performed by: OBSTETRICS & GYNECOLOGY

## 2022-06-22 NOTE — PROGRESS NOTES
No complaints today   No nausea or vomiting   Denies vaginal bleeding or cramping     BP (!) 110/52   Wt 106.2 kg (234 lb 2.1 oz)   LMP 12/03/2021 (Approximate)   BMI 41.48 kg/m²     Prenatal labs reviewed  Aneuploidy screen  : offered  Quadscreen = )    Anatomy US at 20 weeks   Consents=signed   Diabetes screen=today  TdaP=today  GBBS @34-35W=   Growth US @ 36W  BC=       General Pregnancy  recommendations given  PNV + H2O intake    FU in 2 (-)  4  (x)  weeks     Discussed nonpharmacological pain relief methods for labor, techniques and benefits of effective breastfeeding position and latch, and basic breastfeeding management. Encouraged patient to attend [facility] Prenatal [facility]feeding [facility] and to download [facility]  mobile aydee if she has not already done so. Patient verbalizes understanding.     I spent a total of 30 minutes on the day of the visit.This includes face to face time and non-face to face time preparing to see the patient (eg, review of tests), Obtaining and/or reviewing separately obtained history, Documenting clinical information in the electronic or other health record, Independently interpreting resultsand communicating results to the patient/family/caregiver, or Care coordination.          Barber Moya M.D.   OB/GYN

## 2022-06-22 NOTE — PROGRESS NOTES
Patient received Tdap vaccine in left deltoid. Allergies reviewed, no contraindications, patient tolerated well. Patient instructed to wait 15 minutes after receiving injection. All questions answered and patient verbalized understanding.

## 2022-07-15 ENCOUNTER — PATIENT MESSAGE (OUTPATIENT)
Dept: ADMINISTRATIVE | Facility: OTHER | Age: 32
End: 2022-07-15
Payer: MEDICAID

## 2022-07-15 ENCOUNTER — ROUTINE PRENATAL (OUTPATIENT)
Dept: OBSTETRICS AND GYNECOLOGY | Facility: CLINIC | Age: 32
End: 2022-07-15
Payer: MEDICAID

## 2022-07-15 ENCOUNTER — LAB VISIT (OUTPATIENT)
Dept: LAB | Facility: HOSPITAL | Age: 32
End: 2022-07-15
Attending: OBSTETRICS & GYNECOLOGY
Payer: MEDICAID

## 2022-07-15 VITALS
WEIGHT: 239.19 LBS | SYSTOLIC BLOOD PRESSURE: 102 MMHG | DIASTOLIC BLOOD PRESSURE: 69 MMHG | BODY MASS INDEX: 42.38 KG/M2

## 2022-07-15 DIAGNOSIS — Z3A.29 29 WEEKS GESTATION OF PREGNANCY: ICD-10-CM

## 2022-07-15 DIAGNOSIS — Z98.891 HISTORY OF CESAREAN SECTION: ICD-10-CM

## 2022-07-15 DIAGNOSIS — Z34.83 ENCOUNTER FOR SUPERVISION OF OTHER NORMAL PREGNANCY IN THIRD TRIMESTER: ICD-10-CM

## 2022-07-15 PROBLEM — Z34.93 ENCOUNTER FOR SUPERVISION OF NORMAL PREGNANCY IN THIRD TRIMESTER: Status: ACTIVE | Noted: 2022-07-15

## 2022-07-15 LAB
GLUCOSE SERPL-MCNC: 150 MG/DL
GLUCOSE SERPL-MCNC: 193 MG/DL
GLUCOSE SERPL-MCNC: 81 MG/DL
GLUCOSE SERPL-MCNC: 90 MG/DL (ref 70–110)

## 2022-07-15 PROCEDURE — 99213 OFFICE O/P EST LOW 20 MIN: CPT | Mod: TH,S$PBB,, | Performed by: STUDENT IN AN ORGANIZED HEALTH CARE EDUCATION/TRAINING PROGRAM

## 2022-07-15 PROCEDURE — 99212 OFFICE O/P EST SF 10 MIN: CPT | Mod: PBBFAC,TH,PO | Performed by: STUDENT IN AN ORGANIZED HEALTH CARE EDUCATION/TRAINING PROGRAM

## 2022-07-15 PROCEDURE — 99999 PR PBB SHADOW E&M-EST. PATIENT-LVL II: CPT | Mod: PBBFAC,,, | Performed by: STUDENT IN AN ORGANIZED HEALTH CARE EDUCATION/TRAINING PROGRAM

## 2022-07-15 PROCEDURE — 99213 PR OFFICE/OUTPT VISIT, EST, LEVL III, 20-29 MIN: ICD-10-PCS | Mod: TH,S$PBB,, | Performed by: STUDENT IN AN ORGANIZED HEALTH CARE EDUCATION/TRAINING PROGRAM

## 2022-07-15 PROCEDURE — 36415 COLL VENOUS BLD VENIPUNCTURE: CPT | Performed by: OBSTETRICS & GYNECOLOGY

## 2022-07-15 PROCEDURE — 82951 GLUCOSE TOLERANCE TEST (GTT): CPT | Performed by: OBSTETRICS & GYNECOLOGY

## 2022-07-15 PROCEDURE — 99999 PR PBB SHADOW E&M-EST. PATIENT-LVL II: ICD-10-PCS | Mod: PBBFAC,,, | Performed by: STUDENT IN AN ORGANIZED HEALTH CARE EDUCATION/TRAINING PROGRAM

## 2022-07-15 NOTE — PROGRESS NOTES
Chief Complaint   Patient presents with    Routine Prenatal Visit       32 y.o., at 32w0d by Estimated Date of Delivery: 22    Complaints today: Moya patient. Patient doing well. No obstetric complaints.     ROS  OBSTETRICS:   Contractions: n   Bleeding: n   Loss of fluid: n   Fetal movement: y  GASTRO:   Nausea: n   Vomiting: n      OB History    Para Term  AB Living   2 1 1     1   SAB IAB Ectopic Multiple Live Births           1      # Outcome Date GA Lbr Ken/2nd Weight Sex Delivery Anes PTL Lv   2 Current            1 Term  38w3d    CS-Unspec   BOBBY      Complications: Pre-eclampsia       Dating reviewed  Allergies and problem list reviewed and updated  Medical and surgical history reviewed  Prenatal labs reviewed and updated    PHYSICAL EXAM  /69   Wt 108.5 kg (239 lb 3.2 oz)   LMP 2021 (Approximate)   BMI 42.38 kg/m²     GENERAL: No acute distress  NEURO: Alert and oriented x3  PSYCH: Normal mood and affect  PULMONARY: Non-labored respiration  ABDomen: Soft, gravid, nontender    ASSESSMENT AND PLAN    g2 Problems (from 02/10/22 to present)     Problem Noted Resolved    Encounter for supervision of normal pregnancy in third trimester 7/15/2022 by Ivelisse Hayes MD No          PNC Up to date     labor precautions given  Follow-up: 2 weeks

## 2022-07-26 ENCOUNTER — TELEPHONE (OUTPATIENT)
Dept: OBSTETRICS AND GYNECOLOGY | Facility: CLINIC | Age: 32
End: 2022-07-26
Payer: MEDICAID

## 2022-07-26 DIAGNOSIS — Z3A.35 35 WEEKS GESTATION OF PREGNANCY: Primary | ICD-10-CM

## 2022-07-28 ENCOUNTER — TELEPHONE (OUTPATIENT)
Dept: OBSTETRICS AND GYNECOLOGY | Facility: CLINIC | Age: 32
End: 2022-07-28
Payer: MEDICAID

## 2022-07-28 NOTE — TELEPHONE ENCOUNTER
----- Message from Sanjuanita Boston sent at 7/28/2022  1:57 PM CDT -----  Contact: [phone]/ Self  Type:  Patient Returning Call    Who Called:Pt   Who Left Message for Patient:Unknown   Does the patient know what this is regarding?:reschedule OB appt   Would the patient rather a call back or a response via [facility]? Call back   Best Call Back Number:[phone]  Additional Information: Indonesian speaking pt

## 2022-07-29 ENCOUNTER — ROUTINE PRENATAL (OUTPATIENT)
Dept: OBSTETRICS AND GYNECOLOGY | Facility: CLINIC | Age: 32
End: 2022-07-29
Payer: MEDICAID

## 2022-07-29 VITALS
DIASTOLIC BLOOD PRESSURE: 56 MMHG | SYSTOLIC BLOOD PRESSURE: 122 MMHG | BODY MASS INDEX: 43.09 KG/M2 | WEIGHT: 243.19 LBS

## 2022-07-29 DIAGNOSIS — Z34.83 ENCOUNTER FOR SUPERVISION OF OTHER NORMAL PREGNANCY IN THIRD TRIMESTER: ICD-10-CM

## 2022-07-29 DIAGNOSIS — Z98.891 HISTORY OF CESAREAN SECTION: ICD-10-CM

## 2022-07-29 DIAGNOSIS — Z3A.34 34 WEEKS GESTATION OF PREGNANCY: Primary | ICD-10-CM

## 2022-07-29 PROCEDURE — 99999 PR PBB SHADOW E&M-EST. PATIENT-LVL III: ICD-10-PCS | Mod: PBBFAC,,, | Performed by: OBSTETRICS & GYNECOLOGY

## 2022-07-29 PROCEDURE — 99999 PR PBB SHADOW E&M-EST. PATIENT-LVL III: CPT | Mod: PBBFAC,,, | Performed by: OBSTETRICS & GYNECOLOGY

## 2022-07-29 PROCEDURE — 99214 PR OFFICE/OUTPT VISIT, EST, LEVL IV, 30-39 MIN: ICD-10-PCS | Mod: S$PBB,TH,, | Performed by: OBSTETRICS & GYNECOLOGY

## 2022-07-29 PROCEDURE — 99213 OFFICE O/P EST LOW 20 MIN: CPT | Mod: PBBFAC,TH,PO | Performed by: OBSTETRICS & GYNECOLOGY

## 2022-07-29 PROCEDURE — 99214 OFFICE O/P EST MOD 30 MIN: CPT | Mod: S$PBB,TH,, | Performed by: OBSTETRICS & GYNECOLOGY

## 2022-07-30 NOTE — PROGRESS NOTES
No complaints today   No nausea or vomiting   Denies vaginal bleeding or cramping     BP (!) 122/56   Wt 110.3 kg (243 lb 2.7 oz)   LMP 12/03/2021 (Approximate)   BMI 43.09 kg/m²     Prenatal labs reviewed  Aneuploidy screen  : offered  Quadscreen = )    Anatomy US at 20 weeks   Consents=signed   Diabetes screen=today  TdaP=given  GBBS @34-35W=   Growth US @ 36W  BC=       General Pregnancy  recommendations given  PNV + H2O intake    FU in 2 (-)  4  (x)  weeks     Discussed nonpharmacological pain relief methods for labor, techniques and benefits of effective breastfeeding position and latch, and basic breastfeeding management. Encouraged patient to attend [facility] Pren[facility]feeding [facility] and to download [facility] My [employer]  mobile aydee if she has not already done so. Patient verbalizes understanding.     I spent a total of 30 minutes on the day of the visit.This includes face to face time and non-face to face time preparing to see the patient (eg, review of tests), Obtaining and/or reviewing separately obtained history, Documenting clinical information in the electronic or other health record, Independently interpreting resultsand communicating results to the patient/family/caregiver, or Care coordination.          Barber Moya M.D.   OB/GYN

## 2022-08-05 ENCOUNTER — TELEPHONE (OUTPATIENT)
Dept: OBSTETRICS AND GYNECOLOGY | Facility: CLINIC | Age: 32
End: 2022-08-05
Payer: MEDICAID

## 2022-08-05 DIAGNOSIS — Z3A.35 35 WEEKS GESTATION OF PREGNANCY: Primary | ICD-10-CM

## 2022-08-09 ENCOUNTER — PROCEDURE VISIT (OUTPATIENT)
Dept: OBSTETRICS AND GYNECOLOGY | Facility: CLINIC | Age: 32
End: 2022-08-09
Payer: MEDICAID

## 2022-08-09 DIAGNOSIS — Z36.2 ENCOUNTER FOR FOLLOW-UP ULTRASOUND OF FETAL ANATOMY: ICD-10-CM

## 2022-08-09 DIAGNOSIS — O99.210 MATERNAL MORBID OBESITY, ANTEPARTUM: ICD-10-CM

## 2022-08-09 DIAGNOSIS — E66.01 MATERNAL MORBID OBESITY, ANTEPARTUM: ICD-10-CM

## 2022-08-09 DIAGNOSIS — Z36.4 ANTENATAL SCREENING FOR FETAL GROWTH RETARDATION USING ULTRASONICS: ICD-10-CM

## 2022-08-09 DIAGNOSIS — Z3A.35 35 WEEKS GESTATION OF PREGNANCY: ICD-10-CM

## 2022-08-09 PROCEDURE — 76816 OB US FOLLOW-UP PER FETUS: CPT | Mod: PBBFAC,PO | Performed by: OBSTETRICS & GYNECOLOGY

## 2022-08-09 PROCEDURE — 76816 OB US FOLLOW-UP PER FETUS: CPT | Mod: 26,S$PBB,, | Performed by: OBSTETRICS & GYNECOLOGY

## 2022-08-09 PROCEDURE — 76819 FETAL BIOPHYS PROFIL W/O NST: CPT | Mod: PBBFAC,PO | Performed by: OBSTETRICS & GYNECOLOGY

## 2022-08-09 PROCEDURE — 76819 PR US, OB, FETAL BIOPHYSICAL, W/O NST: ICD-10-PCS | Mod: 26,S$PBB,, | Performed by: OBSTETRICS & GYNECOLOGY

## 2022-08-09 PROCEDURE — 76816 PR  US,PREGNANT UTERUS,F/U,TRANSABD APP: ICD-10-PCS | Mod: 26,S$PBB,, | Performed by: OBSTETRICS & GYNECOLOGY

## 2022-08-09 PROCEDURE — 76819 FETAL BIOPHYS PROFIL W/O NST: CPT | Mod: 26,S$PBB,, | Performed by: OBSTETRICS & GYNECOLOGY

## 2022-08-19 ENCOUNTER — ROUTINE PRENATAL (OUTPATIENT)
Dept: OBSTETRICS AND GYNECOLOGY | Facility: CLINIC | Age: 32
End: 2022-08-19
Payer: MEDICAID

## 2022-08-19 VITALS
WEIGHT: 247.38 LBS | BODY MASS INDEX: 43.83 KG/M2 | SYSTOLIC BLOOD PRESSURE: 130 MMHG | DIASTOLIC BLOOD PRESSURE: 79 MMHG

## 2022-08-19 DIAGNOSIS — Z3A.39 39 WEEKS GESTATION OF PREGNANCY: Primary | ICD-10-CM

## 2022-08-19 DIAGNOSIS — Z98.891 HISTORY OF CESAREAN SECTION: ICD-10-CM

## 2022-08-19 DIAGNOSIS — E66.01 MATERNAL MORBID OBESITY, ANTEPARTUM: ICD-10-CM

## 2022-08-19 DIAGNOSIS — O99.210 MATERNAL MORBID OBESITY, ANTEPARTUM: ICD-10-CM

## 2022-08-19 PROCEDURE — 99999 PR PBB SHADOW E&M-EST. PATIENT-LVL II: CPT | Mod: PBBFAC,,, | Performed by: OBSTETRICS & GYNECOLOGY

## 2022-08-19 PROCEDURE — 99214 PR OFFICE/OUTPT VISIT, EST, LEVL IV, 30-39 MIN: ICD-10-PCS | Mod: S$PBB,TH,, | Performed by: OBSTETRICS & GYNECOLOGY

## 2022-08-19 PROCEDURE — 99999 PR PBB SHADOW E&M-EST. PATIENT-LVL II: ICD-10-PCS | Mod: PBBFAC,,, | Performed by: OBSTETRICS & GYNECOLOGY

## 2022-08-19 PROCEDURE — 99212 OFFICE O/P EST SF 10 MIN: CPT | Mod: PBBFAC,TH,PO | Performed by: OBSTETRICS & GYNECOLOGY

## 2022-08-19 PROCEDURE — 99214 OFFICE O/P EST MOD 30 MIN: CPT | Mod: S$PBB,TH,, | Performed by: OBSTETRICS & GYNECOLOGY

## 2022-08-19 PROCEDURE — 87081 CULTURE SCREEN ONLY: CPT | Performed by: OBSTETRICS & GYNECOLOGY

## 2022-08-19 RX ORDER — SODIUM CITRATE AND CITRIC ACID MONOHYDRATE 334; 500 MG/5ML; MG/5ML
30 SOLUTION ORAL
Status: CANCELLED | OUTPATIENT
Start: 2022-08-19

## 2022-08-19 RX ORDER — SODIUM CHLORIDE, SODIUM LACTATE, POTASSIUM CHLORIDE, CALCIUM CHLORIDE 600; 310; 30; 20 MG/100ML; MG/100ML; MG/100ML; MG/100ML
INJECTION, SOLUTION INTRAVENOUS CONTINUOUS
Status: CANCELLED | OUTPATIENT
Start: 2022-08-19

## 2022-08-19 RX ORDER — MISOPROSTOL 200 UG/1
800 TABLET ORAL
Status: CANCELLED | OUTPATIENT
Start: 2022-08-19

## 2022-08-19 RX ORDER — METHYLERGONOVINE MALEATE 0.2 MG/ML
200 INJECTION INTRAVENOUS
Status: CANCELLED | OUTPATIENT
Start: 2022-08-19

## 2022-08-19 RX ORDER — OXYTOCIN/RINGER'S LACTATE 30/500 ML
95 PLASTIC BAG, INJECTION (ML) INTRAVENOUS ONCE
Status: CANCELLED | OUTPATIENT
Start: 2022-08-19 | End: 2022-08-19

## 2022-08-19 RX ORDER — FAMOTIDINE 10 MG/ML
20 INJECTION INTRAVENOUS
Status: CANCELLED | OUTPATIENT
Start: 2022-08-19

## 2022-08-19 RX ORDER — MUPIROCIN 20 MG/G
OINTMENT TOPICAL
Status: CANCELLED | OUTPATIENT
Start: 2022-08-19

## 2022-08-19 RX ORDER — OXYTOCIN/RINGER'S LACTATE 30/500 ML
334 PLASTIC BAG, INJECTION (ML) INTRAVENOUS ONCE
Status: CANCELLED | OUTPATIENT
Start: 2022-08-19 | End: 2022-08-19

## 2022-08-19 RX ORDER — CARBOPROST TROMETHAMINE 250 UG/ML
250 INJECTION, SOLUTION INTRAMUSCULAR
Status: CANCELLED | OUTPATIENT
Start: 2022-08-19

## 2022-08-19 RX ORDER — CEFAZOLIN SODIUM 2 G/50ML
2 SOLUTION INTRAVENOUS
Status: CANCELLED | OUTPATIENT
Start: 2022-08-19

## 2022-08-19 NOTE — PROGRESS NOTES
No complaints today   No nausea or vomiting   Denies vaginal bleeding or cramping     /79   Wt 112.2 kg (247 lb 5.7 oz)   LMP 12/03/2021 (Approximate)   BMI 43.83 kg/m²     Prenatal labs reviewed  Aneuploidy screen  : offered  Quadscreen = )    Anatomy US at 20 weeks   Consents=signed   Diabetes screen=today  TdaP=given  GBBS @34-35W=   Growth US @ 36W  BC=       General Pregnancy  recommendations given  PNV + H2O intake    FU in 1w    Repeat C/S   9/2/ 1000am   Discussed nonpharmacological pain relief methods for labor, techniques and benefits of effective breastfeeding position and latch, and basic breastfeeding management. Encouraged patient to attend [facility] P[facility]feeding [facility] and to download [facility] [employer]  mobile aydee if she has not already done so. Patient verbalizes understanding.     I spent a total of 30 minutes on the day of the visit.This includes face to face time and non-face to face time preparing to see the patient (eg, review of tests), Obtaining and/or reviewing separately obtained history, Documenting clinical information in the electronic or other health record, Independently interpreting resultsand communicating results to the patient/family/caregiver, or Care coordination.          Barber Moya M.D.   OB/GYN

## 2022-08-20 ENCOUNTER — HOSPITAL ENCOUNTER (OUTPATIENT)
Facility: HOSPITAL | Age: 32
Discharge: HOME | End: 2022-08-20
Attending: OBSTETRICS & GYNECOLOGY | Admitting: OBSTETRICS & GYNECOLOGY
Payer: MEDICAID

## 2022-08-20 VITALS
RESPIRATION RATE: 18 BRPM | HEART RATE: 74 BPM | OXYGEN SATURATION: 100 % | DIASTOLIC BLOOD PRESSURE: 70 MMHG | WEIGHT: 242 LBS | TEMPERATURE: 98 F | BODY MASS INDEX: 42.88 KG/M2 | SYSTOLIC BLOOD PRESSURE: 132 MMHG | HEIGHT: 63 IN

## 2022-08-20 LAB — SARS-COV-2 RDRP RESP QL NAA+PROBE: NEGATIVE

## 2022-08-20 PROCEDURE — U0002 COVID-19 LAB TEST NON-CDC: HCPCS | Performed by: OBSTETRICS & GYNECOLOGY

## 2022-08-21 NOTE — NURSING
- patient arrived to unit with complaints of abdominal pain after intercourse, started last week, SVE cervix closed  -abdominal pain has subsided with oral hydration, patient discharged to home, encouraged to rest and follow-up with OB Monday.   -discharge instructions given with use of video . Written instruction provided as well.

## 2022-08-21 NOTE — DISCHARGE INSTRUCTIONS
Seguimiento con OBGYN el lunes. Reposo en cama hasta la proxima eldon con el obstetra. Abstenerse de tener relaciones sexuales. Regrese si el dolor aumenta.

## 2022-08-22 LAB — BACTERIA SPEC AEROBE CULT: NORMAL

## 2022-08-26 ENCOUNTER — ROUTINE PRENATAL (OUTPATIENT)
Dept: OBSTETRICS AND GYNECOLOGY | Facility: CLINIC | Age: 32
End: 2022-08-26
Payer: MEDICAID

## 2022-08-26 ENCOUNTER — LAB VISIT (OUTPATIENT)
Dept: LAB | Facility: HOSPITAL | Age: 32
End: 2022-08-26
Attending: OBSTETRICS & GYNECOLOGY
Payer: MEDICAID

## 2022-08-26 VITALS — DIASTOLIC BLOOD PRESSURE: 80 MMHG | WEIGHT: 248 LBS | BODY MASS INDEX: 43.95 KG/M2 | SYSTOLIC BLOOD PRESSURE: 127 MMHG

## 2022-08-26 DIAGNOSIS — Z3A.35 35 WEEKS GESTATION OF PREGNANCY: ICD-10-CM

## 2022-08-26 DIAGNOSIS — Z3A.38 38 WEEKS GESTATION OF PREGNANCY: Primary | ICD-10-CM

## 2022-08-26 DIAGNOSIS — Z34.83 ENCOUNTER FOR SUPERVISION OF OTHER NORMAL PREGNANCY IN THIRD TRIMESTER: ICD-10-CM

## 2022-08-26 DIAGNOSIS — Z98.891 HISTORY OF CESAREAN SECTION: ICD-10-CM

## 2022-08-26 LAB
BASOPHILS # BLD AUTO: 0.03 K/UL (ref 0–0.2)
BASOPHILS NFR BLD: 0.3 % (ref 0–1.9)
DIFFERENTIAL METHOD: ABNORMAL
EOSINOPHIL # BLD AUTO: 0.2 K/UL (ref 0–0.5)
EOSINOPHIL NFR BLD: 1.6 % (ref 0–8)
ERYTHROCYTE [DISTWIDTH] IN BLOOD BY AUTOMATED COUNT: 14.6 % (ref 11.5–14.5)
HCT VFR BLD AUTO: 33.3 % (ref 37–48.5)
HGB BLD-MCNC: 11.7 G/DL (ref 12–16)
IMM GRANULOCYTES # BLD AUTO: 0.06 K/UL (ref 0–0.04)
IMM GRANULOCYTES NFR BLD AUTO: 0.5 % (ref 0–0.5)
LYMPHOCYTES # BLD AUTO: 2.3 K/UL (ref 1–4.8)
LYMPHOCYTES NFR BLD: 19.5 % (ref 18–48)
MCH RBC QN AUTO: 27.2 PG (ref 27–31)
MCHC RBC AUTO-ENTMCNC: 35.1 G/DL (ref 32–36)
MCV RBC AUTO: 77 FL (ref 82–98)
MONOCYTES # BLD AUTO: 0.8 K/UL (ref 0.3–1)
MONOCYTES NFR BLD: 7.1 % (ref 4–15)
NEUTROPHILS # BLD AUTO: 8.5 K/UL (ref 1.8–7.7)
NEUTROPHILS NFR BLD: 71 % (ref 38–73)
NRBC BLD-RTO: 0 /100 WBC
PLATELET # BLD AUTO: 218 K/UL (ref 150–450)
PMV BLD AUTO: 12.8 FL (ref 9.2–12.9)
RBC # BLD AUTO: 4.3 M/UL (ref 4–5.4)
WBC # BLD AUTO: 11.89 K/UL (ref 3.9–12.7)

## 2022-08-26 PROCEDURE — 36415 COLL VENOUS BLD VENIPUNCTURE: CPT | Performed by: OBSTETRICS & GYNECOLOGY

## 2022-08-26 PROCEDURE — 85025 COMPLETE CBC W/AUTO DIFF WBC: CPT | Performed by: OBSTETRICS & GYNECOLOGY

## 2022-08-26 PROCEDURE — 86592 SYPHILIS TEST NON-TREP QUAL: CPT | Performed by: OBSTETRICS & GYNECOLOGY

## 2022-08-26 PROCEDURE — 87389 HIV-1 AG W/HIV-1&-2 AB AG IA: CPT | Performed by: OBSTETRICS & GYNECOLOGY

## 2022-08-26 PROCEDURE — 80074 ACUTE HEPATITIS PANEL: CPT | Performed by: OBSTETRICS & GYNECOLOGY

## 2022-08-26 PROCEDURE — 99213 PR OFFICE/OUTPT VISIT, EST, LEVL III, 20-29 MIN: ICD-10-PCS | Mod: S$PBB,TH,, | Performed by: OBSTETRICS & GYNECOLOGY

## 2022-08-26 PROCEDURE — 99999 PR PBB SHADOW E&M-EST. PATIENT-LVL III: CPT | Mod: PBBFAC,,, | Performed by: OBSTETRICS & GYNECOLOGY

## 2022-08-26 PROCEDURE — 99213 OFFICE O/P EST LOW 20 MIN: CPT | Mod: PBBFAC,TH,PO | Performed by: OBSTETRICS & GYNECOLOGY

## 2022-08-26 PROCEDURE — 99213 OFFICE O/P EST LOW 20 MIN: CPT | Mod: S$PBB,TH,, | Performed by: OBSTETRICS & GYNECOLOGY

## 2022-08-26 PROCEDURE — 99999 PR PBB SHADOW E&M-EST. PATIENT-LVL III: ICD-10-PCS | Mod: PBBFAC,,, | Performed by: OBSTETRICS & GYNECOLOGY

## 2022-08-26 NOTE — PROGRESS NOTES
No complaints today   No nausea or vomiting   Denies vaginal bleeding or cramping     /80   Wt 112.5 kg (248 lb 0.3 oz)   LMP 12/03/2021 (Approximate)   BMI 43.95 kg/m²     Prenatal labs reviewed  Aneuploidy screen  : offered  Quadscreen = )    Anatomy US at 20 weeks   Consents=signed   Diabetes screen=negTdaP=given  GBBS @34-35W= neg  Growth US @ 36W  BC=       General Pregnancy  recommendations given  PNV + H2O intake    FU in 1w    Repeat C/S   9/2/ 1000am   Discussed nonpharmacological pain relief methods for labor, techniques and benefits of effective breastfeeding position and latch, and basic breastfeeding management. Encouraged patient to attend [facility] Prenatal Breastfeeding Class and to download [facility] My My[facility]  mobile aydee if she has not already done so. Patient verbalizes understanding.     I spent a total of 30 minutes on the day of the visit.This includes face to face time and non-face to face time preparing to see the patient (eg, review of tests), Obtaining and/or reviewing separately obtained history, Documenting clinical information in the electronic or other health record, Independently interpreting resultsand communicating results to the patient/family/caregiver, or Care coordination.          Barber Moya M.D.   OB/GYN

## 2022-08-27 LAB — RPR SER QL: NORMAL

## 2022-08-29 LAB
HAV IGM SERPL QL IA: NEGATIVE
HBV CORE IGM SERPL QL IA: NEGATIVE
HBV SURFACE AG SERPL QL IA: NEGATIVE
HCV AB SERPL QL IA: NEGATIVE
HIV 1+2 AB+HIV1 P24 AG SERPL QL IA: NEGATIVE

## 2022-09-01 ENCOUNTER — TELEPHONE (OUTPATIENT)
Dept: OBSTETRICS AND GYNECOLOGY | Facility: CLINIC | Age: 32
End: 2022-09-01
Payer: MEDICAID

## 2022-09-01 ENCOUNTER — ANESTHESIA EVENT (OUTPATIENT)
Dept: OBSTETRICS AND GYNECOLOGY | Facility: HOSPITAL | Age: 32
End: 2022-09-01
Payer: MEDICAID

## 2022-09-01 NOTE — TELEPHONE ENCOUNTER
----- Message from Aubrey Dick sent at 9/1/2022 12:59 PM CDT -----  Contact: pt.  .Type:  Needs Medical Advice    Who Called: pt    Would the patient rather a call back or a response via [facility]? Call back  Best Call Back Number: [phone]  Additional Information: BEBETOchelo is requesting a call back regarding her C section appt on tomorrow.

## 2022-09-01 NOTE — TELEPHONE ENCOUNTER
Spoke to pt and she asked what time is she to come in tomorrow for  because Dr triplett mentioned to her to come at 6 a.m advised her to stick to that time. Pt verbalized understanding.

## 2022-09-02 ENCOUNTER — ANESTHESIA (OUTPATIENT)
Dept: OBSTETRICS AND GYNECOLOGY | Facility: HOSPITAL | Age: 32
End: 2022-09-02
Payer: MEDICAID

## 2022-09-02 ENCOUNTER — HOSPITAL ENCOUNTER (INPATIENT)
Facility: HOSPITAL | Age: 32
LOS: 3 days | Discharge: HOME | End: 2022-09-05
Attending: OBSTETRICS & GYNECOLOGY | Admitting: OBSTETRICS & GYNECOLOGY
Payer: MEDICAID

## 2022-09-02 DIAGNOSIS — Z3A.39 39 WEEKS GESTATION OF PREGNANCY: ICD-10-CM

## 2022-09-02 DIAGNOSIS — Z98.891 HISTORY OF CESAREAN SECTION: ICD-10-CM

## 2022-09-02 DIAGNOSIS — E66.01 MATERNAL MORBID OBESITY, ANTEPARTUM: ICD-10-CM

## 2022-09-02 DIAGNOSIS — O99.210 MATERNAL MORBID OBESITY, ANTEPARTUM: ICD-10-CM

## 2022-09-02 PROBLEM — Z3A.14 14 WEEKS GESTATION OF PREGNANCY: Status: RESOLVED | Noted: 2022-03-09 | Resolved: 2022-09-02

## 2022-09-02 LAB
ABO + RH BLD: NORMAL
BASOPHILS # BLD AUTO: 0.04 K/UL (ref 0–0.2)
BASOPHILS NFR BLD: 0.4 % (ref 0–1.9)
BLD GP AB SCN CELLS X3 SERPL QL: NORMAL
DIFFERENTIAL METHOD: ABNORMAL
EOSINOPHIL # BLD AUTO: 0.2 K/UL (ref 0–0.5)
EOSINOPHIL NFR BLD: 1.7 % (ref 0–8)
ERYTHROCYTE [DISTWIDTH] IN BLOOD BY AUTOMATED COUNT: 14.4 % (ref 11.5–14.5)
HCT VFR BLD AUTO: 32.6 % (ref 37–48.5)
HGB BLD-MCNC: 11.1 G/DL (ref 12–16)
HIV1+2 IGG SERPL QL IA.RAPID: NORMAL
IMM GRANULOCYTES # BLD AUTO: 0.05 K/UL (ref 0–0.04)
IMM GRANULOCYTES NFR BLD AUTO: 0.4 % (ref 0–0.5)
LYMPHOCYTES # BLD AUTO: 2.5 K/UL (ref 1–4.8)
LYMPHOCYTES NFR BLD: 22.3 % (ref 18–48)
MCH RBC QN AUTO: 26.7 PG (ref 27–31)
MCHC RBC AUTO-ENTMCNC: 34 G/DL (ref 32–36)
MCV RBC AUTO: 78 FL (ref 82–98)
MONOCYTES # BLD AUTO: 0.8 K/UL (ref 0.3–1)
MONOCYTES NFR BLD: 7.2 % (ref 4–15)
NEUTROPHILS # BLD AUTO: 7.6 K/UL (ref 1.8–7.7)
NEUTROPHILS NFR BLD: 68 % (ref 38–73)
NRBC BLD-RTO: 0 /100 WBC
PLATELET # BLD AUTO: 205 K/UL (ref 150–450)
PMV BLD AUTO: 12.7 FL (ref 9.2–12.9)
RBC # BLD AUTO: 4.16 M/UL (ref 4–5.4)
SARS-COV-2 RDRP RESP QL NAA+PROBE: NEGATIVE
WBC # BLD AUTO: 11.2 K/UL (ref 3.9–12.7)

## 2022-09-02 PROCEDURE — 71000033 HC RECOVERY, INTIAL HOUR: Performed by: OBSTETRICS & GYNECOLOGY

## 2022-09-02 PROCEDURE — 63600175 PHARM REV CODE 636 W HCPCS: Performed by: STUDENT IN AN ORGANIZED HEALTH CARE EDUCATION/TRAINING PROGRAM

## 2022-09-02 PROCEDURE — 63600175 PHARM REV CODE 636 W HCPCS: Performed by: OBSTETRICS & GYNECOLOGY

## 2022-09-02 PROCEDURE — 36004724 HC OB OR TIME LEV III - 1ST 15 MIN: Performed by: OBSTETRICS & GYNECOLOGY

## 2022-09-02 PROCEDURE — 85025 COMPLETE CBC W/AUTO DIFF WBC: CPT | Performed by: OBSTETRICS & GYNECOLOGY

## 2022-09-02 PROCEDURE — 59514 CESAREAN DELIVERY ONLY: CPT | Mod: GB,,, | Performed by: OBSTETRICS & GYNECOLOGY

## 2022-09-02 PROCEDURE — U0002 COVID-19 LAB TEST NON-CDC: HCPCS | Performed by: OBSTETRICS & GYNECOLOGY

## 2022-09-02 PROCEDURE — 59514 PR CESAREAN DELIVERY ONLY: ICD-10-PCS | Mod: GB,,, | Performed by: OBSTETRICS & GYNECOLOGY

## 2022-09-02 PROCEDURE — 25000003 PHARM REV CODE 250: Performed by: STUDENT IN AN ORGANIZED HEALTH CARE EDUCATION/TRAINING PROGRAM

## 2022-09-02 PROCEDURE — 37000008 HC ANESTHESIA 1ST 15 MINUTES: Performed by: OBSTETRICS & GYNECOLOGY

## 2022-09-02 PROCEDURE — 25000003 PHARM REV CODE 250: Performed by: OBSTETRICS & GYNECOLOGY

## 2022-09-02 PROCEDURE — 86703 HIV-1/HIV-2 1 RESULT ANTBDY: CPT | Performed by: OBSTETRICS & GYNECOLOGY

## 2022-09-02 PROCEDURE — 51702 INSERT TEMP BLADDER CATH: CPT

## 2022-09-02 PROCEDURE — 86850 RBC ANTIBODY SCREEN: CPT | Performed by: OBSTETRICS & GYNECOLOGY

## 2022-09-02 PROCEDURE — 37000009 HC ANESTHESIA EA ADD 15 MINS: Performed by: OBSTETRICS & GYNECOLOGY

## 2022-09-02 PROCEDURE — 11000001 HC ACUTE MED/SURG PRIVATE ROOM

## 2022-09-02 PROCEDURE — 86592 SYPHILIS TEST NON-TREP QUAL: CPT | Performed by: OBSTETRICS & GYNECOLOGY

## 2022-09-02 PROCEDURE — 71000039 HC RECOVERY, EACH ADD'L HOUR: Performed by: OBSTETRICS & GYNECOLOGY

## 2022-09-02 PROCEDURE — 36415 COLL VENOUS BLD VENIPUNCTURE: CPT | Performed by: OBSTETRICS & GYNECOLOGY

## 2022-09-02 PROCEDURE — 36004725 HC OB OR TIME LEV III - EA ADD 15 MIN: Performed by: OBSTETRICS & GYNECOLOGY

## 2022-09-02 PROCEDURE — 59025 FETAL NON-STRESS TEST: CPT

## 2022-09-02 RX ORDER — OXYCODONE HYDROCHLORIDE 5 MG/1
5 TABLET ORAL EVERY 4 HOURS PRN
Status: DISCONTINUED | OUTPATIENT
Start: 2022-09-02 | End: 2022-09-02

## 2022-09-02 RX ORDER — AMOXICILLIN 250 MG
1 CAPSULE ORAL NIGHTLY PRN
Status: DISCONTINUED | OUTPATIENT
Start: 2022-09-02 | End: 2022-09-05 | Stop reason: HOSPADM

## 2022-09-02 RX ORDER — METHYLERGONOVINE MALEATE 0.2 MG/ML
200 INJECTION INTRAVENOUS
Status: DISCONTINUED | OUTPATIENT
Start: 2022-09-02 | End: 2022-09-05 | Stop reason: HOSPADM

## 2022-09-02 RX ORDER — MISOPROSTOL 200 UG/1
800 TABLET ORAL
Status: DISCONTINUED | OUTPATIENT
Start: 2022-09-02 | End: 2022-09-05 | Stop reason: HOSPADM

## 2022-09-02 RX ORDER — ONDANSETRON 8 MG/1
8 TABLET, ORALLY DISINTEGRATING ORAL EVERY 8 HOURS PRN
Status: CANCELLED | OUTPATIENT
Start: 2022-09-02

## 2022-09-02 RX ORDER — CARBOPROST TROMETHAMINE 250 UG/ML
250 INJECTION, SOLUTION INTRAMUSCULAR
Status: DISCONTINUED | OUTPATIENT
Start: 2022-09-02 | End: 2022-09-05 | Stop reason: HOSPADM

## 2022-09-02 RX ORDER — MUPIROCIN 20 MG/G
OINTMENT TOPICAL
Status: DISCONTINUED | OUTPATIENT
Start: 2022-09-02 | End: 2022-09-05 | Stop reason: HOSPADM

## 2022-09-02 RX ORDER — FAMOTIDINE 10 MG/ML
20 INJECTION INTRAVENOUS
Status: DISCONTINUED | OUTPATIENT
Start: 2022-09-02 | End: 2022-09-05 | Stop reason: HOSPADM

## 2022-09-02 RX ORDER — BUPIVACAINE HYDROCHLORIDE 7.5 MG/ML
INJECTION, SOLUTION INTRASPINAL
Status: DISCONTINUED | OUTPATIENT
Start: 2022-09-02 | End: 2022-09-02

## 2022-09-02 RX ORDER — PROCHLORPERAZINE EDISYLATE 5 MG/ML
5 INJECTION INTRAMUSCULAR; INTRAVENOUS EVERY 6 HOURS PRN
Status: CANCELLED | OUTPATIENT
Start: 2022-09-02

## 2022-09-02 RX ORDER — FENTANYL CITRATE 50 UG/ML
INJECTION, SOLUTION INTRAMUSCULAR; INTRAVENOUS
Status: DISCONTINUED | OUTPATIENT
Start: 2022-09-02 | End: 2022-09-02

## 2022-09-02 RX ORDER — MUPIROCIN 20 MG/G
OINTMENT TOPICAL 2 TIMES DAILY
Status: DISCONTINUED | OUTPATIENT
Start: 2022-09-02 | End: 2022-09-05 | Stop reason: HOSPADM

## 2022-09-02 RX ORDER — DIPHENHYDRAMINE HCL 25 MG
25 CAPSULE ORAL EVERY 4 HOURS PRN
Status: DISCONTINUED | OUTPATIENT
Start: 2022-09-02 | End: 2022-09-05 | Stop reason: HOSPADM

## 2022-09-02 RX ORDER — BISACODYL 10 MG
10 SUPPOSITORY, RECTAL RECTAL ONCE AS NEEDED
Status: DISCONTINUED | OUTPATIENT
Start: 2022-09-02 | End: 2022-09-05 | Stop reason: HOSPADM

## 2022-09-02 RX ORDER — OXYCODONE AND ACETAMINOPHEN 5; 325 MG/1; MG/1
1 TABLET ORAL EVERY 4 HOURS PRN
Status: DISCONTINUED | OUTPATIENT
Start: 2022-09-02 | End: 2022-09-05 | Stop reason: HOSPADM

## 2022-09-02 RX ORDER — PROCHLORPERAZINE EDISYLATE 5 MG/ML
5 INJECTION INTRAMUSCULAR; INTRAVENOUS EVERY 6 HOURS PRN
Status: DISCONTINUED | OUTPATIENT
Start: 2022-09-02 | End: 2022-09-05 | Stop reason: HOSPADM

## 2022-09-02 RX ORDER — DOCUSATE SODIUM 100 MG/1
200 CAPSULE, LIQUID FILLED ORAL 2 TIMES DAILY
Status: DISCONTINUED | OUTPATIENT
Start: 2022-09-02 | End: 2022-09-05 | Stop reason: HOSPADM

## 2022-09-02 RX ORDER — OXYCODONE AND ACETAMINOPHEN 10; 325 MG/1; MG/1
1 TABLET ORAL EVERY 4 HOURS PRN
Status: DISCONTINUED | OUTPATIENT
Start: 2022-09-02 | End: 2022-09-05 | Stop reason: HOSPADM

## 2022-09-02 RX ORDER — BUPIVACAINE HYDROCHLORIDE 2.5 MG/ML
20 INJECTION, SOLUTION EPIDURAL; INFILTRATION; INTRACAUDAL ONCE
Status: DISCONTINUED | OUTPATIENT
Start: 2022-09-02 | End: 2022-09-05 | Stop reason: HOSPADM

## 2022-09-02 RX ORDER — NALBUPHINE HYDROCHLORIDE 10 MG/ML
2.5 INJECTION, SOLUTION INTRAMUSCULAR; INTRAVENOUS; SUBCUTANEOUS ONCE
Status: DISCONTINUED | OUTPATIENT
Start: 2022-09-02 | End: 2022-09-05 | Stop reason: HOSPADM

## 2022-09-02 RX ORDER — OXYTOCIN/RINGER'S LACTATE 30/500 ML
334 PLASTIC BAG, INJECTION (ML) INTRAVENOUS ONCE
Status: COMPLETED | OUTPATIENT
Start: 2022-09-02 | End: 2022-09-02

## 2022-09-02 RX ORDER — ADHESIVE BANDAGE
30 BANDAGE TOPICAL 2 TIMES DAILY PRN
Status: DISCONTINUED | OUTPATIENT
Start: 2022-09-03 | End: 2022-09-05 | Stop reason: HOSPADM

## 2022-09-02 RX ORDER — SODIUM CHLORIDE, SODIUM LACTATE, POTASSIUM CHLORIDE, CALCIUM CHLORIDE 600; 310; 30; 20 MG/100ML; MG/100ML; MG/100ML; MG/100ML
INJECTION, SOLUTION INTRAVENOUS CONTINUOUS
Status: DISCONTINUED | OUTPATIENT
Start: 2022-09-02 | End: 2022-09-05 | Stop reason: HOSPADM

## 2022-09-02 RX ORDER — MORPHINE SULFATE 1 MG/ML
INJECTION, SOLUTION EPIDURAL; INTRATHECAL; INTRAVENOUS
Status: DISCONTINUED | OUTPATIENT
Start: 2022-09-02 | End: 2022-09-02

## 2022-09-02 RX ORDER — ACETAMINOPHEN 325 MG/1
650 TABLET ORAL EVERY 6 HOURS
Status: COMPLETED | OUTPATIENT
Start: 2022-09-02 | End: 2022-09-03

## 2022-09-02 RX ORDER — ONDANSETRON 2 MG/ML
4 INJECTION INTRAMUSCULAR; INTRAVENOUS EVERY 6 HOURS PRN
Status: ACTIVE | OUTPATIENT
Start: 2022-09-02 | End: 2022-09-03

## 2022-09-02 RX ORDER — HYDROMORPHONE HYDROCHLORIDE 1 MG/ML
1 INJECTION, SOLUTION INTRAMUSCULAR; INTRAVENOUS; SUBCUTANEOUS EVERY 4 HOURS PRN
Status: DISCONTINUED | OUTPATIENT
Start: 2022-09-02 | End: 2022-09-05 | Stop reason: HOSPADM

## 2022-09-02 RX ORDER — SIMETHICONE 80 MG
1 TABLET,CHEWABLE ORAL EVERY 6 HOURS PRN
Status: DISCONTINUED | OUTPATIENT
Start: 2022-09-02 | End: 2022-09-05 | Stop reason: HOSPADM

## 2022-09-02 RX ORDER — DIPHENHYDRAMINE HYDROCHLORIDE 50 MG/ML
12.5 INJECTION INTRAMUSCULAR; INTRAVENOUS
Status: DISCONTINUED | OUTPATIENT
Start: 2022-09-02 | End: 2022-09-05 | Stop reason: HOSPADM

## 2022-09-02 RX ORDER — SODIUM CHLORIDE 9 MG/ML
INJECTION, SOLUTION INTRAVENOUS CONTINUOUS
Status: DISCONTINUED | OUTPATIENT
Start: 2022-09-02 | End: 2022-09-05 | Stop reason: HOSPADM

## 2022-09-02 RX ORDER — SODIUM CITRATE AND CITRIC ACID MONOHYDRATE 334; 500 MG/5ML; MG/5ML
30 SOLUTION ORAL
Status: DISCONTINUED | OUTPATIENT
Start: 2022-09-02 | End: 2022-09-05 | Stop reason: HOSPADM

## 2022-09-02 RX ORDER — PRENATAL WITH FERROUS FUM AND FOLIC ACID 3080; 920; 120; 400; 22; 1.84; 3; 20; 10; 1; 12; 200; 27; 25; 2 [IU]/1; [IU]/1; MG/1; [IU]/1; MG/1; MG/1; MG/1; MG/1; MG/1; MG/1; UG/1; MG/1; MG/1; MG/1; MG/1
1 TABLET ORAL DAILY
Status: DISCONTINUED | OUTPATIENT
Start: 2022-09-02 | End: 2022-09-05 | Stop reason: HOSPADM

## 2022-09-02 RX ORDER — OXYTOCIN/RINGER'S LACTATE 30/500 ML
95 PLASTIC BAG, INJECTION (ML) INTRAVENOUS ONCE
Status: DISCONTINUED | OUTPATIENT
Start: 2022-09-02 | End: 2022-09-05 | Stop reason: HOSPADM

## 2022-09-02 RX ORDER — OXYCODONE HYDROCHLORIDE 5 MG/1
10 TABLET ORAL EVERY 4 HOURS PRN
Status: DISCONTINUED | OUTPATIENT
Start: 2022-09-02 | End: 2022-09-02

## 2022-09-02 RX ORDER — KETOROLAC TROMETHAMINE 30 MG/ML
30 INJECTION, SOLUTION INTRAMUSCULAR; INTRAVENOUS EVERY 6 HOURS
Status: COMPLETED | OUTPATIENT
Start: 2022-09-02 | End: 2022-09-03

## 2022-09-02 RX ORDER — CEFAZOLIN SODIUM 2 G/50ML
2 SOLUTION INTRAVENOUS
Status: DISCONTINUED | OUTPATIENT
Start: 2022-09-02 | End: 2022-09-02

## 2022-09-02 RX ORDER — NAPROXEN 500 MG/1
500 TABLET ORAL EVERY 8 HOURS PRN
Status: DISCONTINUED | OUTPATIENT
Start: 2022-09-02 | End: 2022-09-05 | Stop reason: HOSPADM

## 2022-09-02 RX ORDER — ONDANSETRON HYDROCHLORIDE 2 MG/ML
INJECTION, SOLUTION INTRAMUSCULAR; INTRAVENOUS
Status: DISCONTINUED | OUTPATIENT
Start: 2022-09-02 | End: 2022-09-02

## 2022-09-02 RX ADMIN — DOCUSATE SODIUM 200 MG: 100 CAPSULE, LIQUID FILLED ORAL at 08:09

## 2022-09-02 RX ADMIN — FENTANYL CITRATE 15 MCG: 50 INJECTION, SOLUTION INTRAMUSCULAR; INTRAVENOUS at 10:09

## 2022-09-02 RX ADMIN — DIPHENHYDRAMINE HYDROCHLORIDE 25 MG: 25 CAPSULE ORAL at 02:09

## 2022-09-02 RX ADMIN — SODIUM CHLORIDE: 0.9 INJECTION, SOLUTION INTRAVENOUS at 09:09

## 2022-09-02 RX ADMIN — KETOROLAC TROMETHAMINE 30 MG: 30 INJECTION, SOLUTION INTRAMUSCULAR; INTRAVENOUS at 05:09

## 2022-09-02 RX ADMIN — DIPHENHYDRAMINE HYDROCHLORIDE 25 MG: 25 CAPSULE ORAL at 09:09

## 2022-09-02 RX ADMIN — ACETAMINOPHEN 650 MG: 325 TABLET ORAL at 05:09

## 2022-09-02 RX ADMIN — SODIUM CHLORIDE, SODIUM LACTATE, POTASSIUM CHLORIDE, AND CALCIUM CHLORIDE: .6; .31; .03; .02 INJECTION, SOLUTION INTRAVENOUS at 08:09

## 2022-09-02 RX ADMIN — FAMOTIDINE 20 MG: 10 INJECTION INTRAVENOUS at 10:09

## 2022-09-02 RX ADMIN — DEXTROSE 2 G: 50 INJECTION, SOLUTION INTRAVENOUS at 10:09

## 2022-09-02 RX ADMIN — KETOROLAC TROMETHAMINE 30 MG: 30 INJECTION, SOLUTION INTRAMUSCULAR; INTRAVENOUS at 11:09

## 2022-09-02 RX ADMIN — PHENYLEPHRINE HYDROCHLORIDE 75 MCG/MIN: 10 INJECTION INTRAVENOUS at 10:09

## 2022-09-02 RX ADMIN — MORPHINE SULFATE 0.15 MG: 1 INJECTION, SOLUTION EPIDURAL; INTRATHECAL; INTRAVENOUS at 10:09

## 2022-09-02 RX ADMIN — ONDANSETRON 4 MG: 2 INJECTION, SOLUTION INTRAMUSCULAR; INTRAVENOUS at 10:09

## 2022-09-02 RX ADMIN — Medication 334 MILLI-UNITS/MIN: at 10:09

## 2022-09-02 RX ADMIN — PRENATAL VIT W/ FE FUMARATE-FA TAB 27-0.8 MG 1 TABLET: 27-0.8 TAB at 02:09

## 2022-09-02 RX ADMIN — DOCUSATE SODIUM 200 MG: 100 CAPSULE, LIQUID FILLED ORAL at 02:09

## 2022-09-02 RX ADMIN — MUPIROCIN: 20 OINTMENT TOPICAL at 08:09

## 2022-09-02 RX ADMIN — SODIUM CHLORIDE, SODIUM LACTATE, POTASSIUM CHLORIDE, AND CALCIUM CHLORIDE: .6; .31; .03; .02 INJECTION, SOLUTION INTRAVENOUS at 10:09

## 2022-09-02 RX ADMIN — BUPIVACAINE HYDROCHLORIDE 1.4 ML: 7.5 INJECTION, SOLUTION SUBARACHNOID at 10:09

## 2022-09-02 RX ADMIN — SODIUM CHLORIDE: 0.9 INJECTION, SOLUTION INTRAVENOUS at 02:09

## 2022-09-02 NOTE — PLAN OF CARE
Pt is doing well, vss, nadn, no c/o pain, pain well controlled with prn ordered pain meds. Henry in place. Uop wnls.

## 2022-09-02 NOTE — DISCHARGE INSTRUCTIONS
"Instrucciones Para Melany de Yocasta    Instrucciones a Seguir    Dieta regular  Actividad: Aumentarntar gradualmente  Joseph: Regadera o Geno  Restricciones: No levantar nada pesado  Cuidado Personal: No tampones o duchas vaginales  Actividad Sexual: Naye relaciones sexuales  Planificacion Familiar: Consulta con schmid medico  Cuidado de los Senos: Use un sosten de soporte  Regresar al trabajo/escuela: Cuando schmid medico le indique    Cuando debe llamar al Doctor    *Fiebre de 100.4 o mas alto  *Nausea/Vomito persistente  *Secrecion de la incision  *Clare sangrado vaginal o coagulos  *Inflamacion/dolor en los brazos o las piernas  *Severo dolor de fang, vision borrosa or desmayos  *Frequencia/ardor urinario  *Senales de depresion post-parto        La Depresion Post-Parto    Usted acaba de tener un shari'.  A pesar de que sabe que deberia sentirse emocionada y alston, lo que seinte son ganas de llorar sin motivo y tiene dificultades para realizar shabbir tareas diarias.  Usted pasa la mayor parte del tiempo leanne, cansada y desesperanzada, y hasta podria sentirse avergonzada o culpable.  Cande lo que le esta sucediendo no es culpa suya, y puede sentirse mejor.  Hable con schmid medico para que la ayude.     La Depresion Despues del Parto    Mike vez sienta cansancio y ganas de llorar narcisa despues de melany a river.  Esta etapa melancolica, denominada "baby blues", puede hacerla sentir leanne y desesperanzada, o temerosa de que algo abdirahman le vaya a pasar al shari.  Algunas mujeres hasta llegan a poner en lisa el que puedan ser buenas madres.    Que' es la Depresion?    La depresion es un trastorno del animo que afecta schmid manera de pernsar y de sentir.  El sintoma mas frecuente es un sentimiento de honda tristeza: tambien podria causane la sensacion de que ya no puede sobrellevar la tawanna.    Otros sintomas incluyen:      * Ganar o perder mucho peso  * Dormir en exceso o demasiado poco  * Estar cansada todo el tiempo  * Sentirse inquieta  * " Tener miedo de querer herir al shari'  * No tener interes por el shari'  * Sentirse inutil o culpable   * No encontrar placer en las cosas que solia gustarle hacer  * Dificultades para pensar con claridad o christiana decisiones  * Pensar sobre la muerte o el suicidio     Que' Causa la Depresion Postparto?    La causea exacta de la depresion postarto se desconce, aunque posiblemente es el resultado de los cambios hormonales que suceden david y despues del parto.  Tambien puede deberse al cansancio que le causan las exigencias del shari' y el proceso de adaptacion a schmid maternidad.  Todos estos factores podrian deprimirla.  En algunos casos, existe didier predisposicion genetica a rosendo tipo de depresion.    La depresion puede tratarse    Lo zaragoza es que hay muchas maneras de tratar la depresion postparo.  Emprenda el primer paso para sentirse mejor hablando con schmid medico.     Recursos    * National Institutes of Mental Health-- 884-463-1777     www.nimh.nih.gov    * National [place] on Mental Illness -- 276.580.7135     Www.daniel.org    * Mental Health [place] --  [phone]     Www.[facility].org    * National Suidide Hotline -- 923.586.1557    9255-8158 [employer]  Todos los derechos reservados.  Esta informacion no pretende sustituir las atencion medica profesional.  Solo schmid medico puede diagnosticar y tratar un problema de misa.       Patient Discharge Instructions for Postpartum Women    Resume Regular Diet  Increase activity gradually, no heavy lifting  Shower  No tampons, douching or sexual intercourse.  Discuss birth control options with your physician.  Wear a support bra  Return to work/school when you've been cleared by a physician    Call your physician if     *Fever of 100.4 or higher  *Persistent nausea/ vomiting  *Incisional drainage  *Heavy vaginal bleeding or large clots (Heavy bleeding is soaking 1 pad in an hour)  *Swelling and pain in arms or legs  *Severe headaches, blurred vision or  "fainting  *Shortness of breath  *Frequency and burning with urination  *Signs of postpartum depression, discuss these signs with your physician    Call lactation services for questions regarding feeding, nipple and breast care, and general questions about lactation.  They can be reached at [phone]         Understanding Postpartum Depression    You've just had a baby.  You know you should be excited and happy.  But instead you find yourself crying for no reason.  You may have trouble coping with your daily tasks.  You feel sad, tired, and hopeless most of the time.  You may even feel ashamed or guilty.  But what you're going through is not your fault and you can feel better.  Talk to your doctor.  He or she can help.    Depression After Childbirth    You may be weepy and tired right after giving birth.  These feelings are normal.  They're sometimes called the "baby blues."  These blues go away 2-3 weeks.  However, postpartum (meaning "after birth") depression lasts much longer and is more sever than the "baby blues."  It can make you feel sad and hopeless.  You may also fear that your baby will be harmed and worry about being a bad mother.      What is Depression?    Depression is a mood disorder that affects the way you think and feel.  The most common symptom is a feeling of deep sadness.  You may also feel as if you just can't cope with life.    Other symptoms include:      * Gaining or loosing weight  * Sleeping too much or too little  * Feeling tired all the time  * Feeling restless  * Fears of harming your baby   * Lack of interest in your baby  * Feeling worthless or guilty  * No longer finding pleasure in things you used to  * Having trouble thinking clearly or making decisions  * Thoughts of hurting yourself or your baby    What Causes Postpartum Depression    The exact causes of postpartum depression isn't known.  It may be due to changes in your hormones during and after childbirth.  You may also be " tired from caring for your baby and adjusting to being a mother.  All these factors may make you feel depressed.  In some cases, your genes may also play a role.    Depression Can Be Treated    The good news is that there are many ways to treat postpartum depression.  Talking to your doctor is the first step toward feeling better.    Resources:    * National [place] of Mental Health  -- 546.933.4680    www.[facility].nih.gov    * National [place] on Mental Illness --887.799.5482    Www.daniel.org    * Mental Health [place] -- 527.340.5612     Www.[facility].org    * National Suicide Hotline --997.422.2036 (800-SUICIDE)    2415-3333 [employer]  All rights reserved.  This information is not intended as a substitute for professional medical care.  Always follow up with your healthcare professional's instructions.    Instrucciones de la lactancia maternal para los bebes recién nacidos:   La [employer]ia [employer] recomienda la leche maternal coco la unica Micaela de nutrición para schmid bebé david los primeros 6 meses de la tawanna, y puede continuar, con la introducción de comida, hasta y despues de 1 ano de edad. Informado sobre astorga consejos: Hay muchos beneficios de amamantar para el misa de la madre y del bebé. Naye los chupones, teteras, o botellas por lo menos por las primeras 4 semanas. Usar los chupones, teteras, o botellas pueden interumpir el proceso de amamantar.   Alimenta en cuanto hay muestras de hambre:  Grace en la boca, hacienda movimientos de succionar.  Ruiditos suavecitos o estirarse  Llorar es un signo de hambre tardío: no esperes a que el bebé llore.  Es de esperarse que haya 8-12 alimentaciones por 24 horas, aunque estas alimentaciones no sigan un horario definido.  Alterna el seno con el que empiezas, o empieza con el seno que se siente más lleno.  Cambia de lado cuando el bebé empiece a tragar más despacio o se desconecte del seno.  Esta sarah si el bebé no come del erica seno en  cada alimentación.  Si el bebé esta muy dormido o somnoliente: el contacto de piel a piel puede animarlo a empezar a comer:  Quítale la camiseta y acuéstalo sobre tu pecho desnudo  Duerme cerca de tu bebé, aun en la casa. Aprende a melany pecho acostada.  En la posición correcta los dos estarán cómodos:  barbilla con seno, pecho con pecho  Labio del bebé abierto hacia afuera para tragar: el labio del bebé debe estar volteado hacia afuera  Boca abierta sarah naz: la boca del bebé cubre la mayoría de la areola (el área oscura del seno)--no nada más el pezón  Fíjate en los signos de que hay transferencia de leche:  Puedes oír al bebé tragar.  No se oyen ruidos más o menos trisha coco clic.  El bebé no demuestra que tiene más hambre después de la alimentación.  El cuerpo del bebé y shabbir mary están relajados por un tiempo corto.  Lo que entra, tiene que salir. Está pendiente de:  Al cuarto día, por lo menos 3 cacas al día.  La blas cambia de maraynn a ana o café y luego a amarillo más líquido cuando baja tu leche.  Después del cuarto día, por lo menos 6 pa?ales mojados/pesados al día.  La orina debe ser de color amarillo rebeca cuando baja tu leche.  Debes christiana agua cuando tienes sed y comer alimentos sanos cuando tiene hambre. Roya siesta para descansar lo suficiente.   No tomes medicamentos o alcohol sino con el consejo de schmid doctor. Puedes llamar al [facility] ([phone]), Lunes a Viernes, 8am-5pm, para obtener información sobre los medicamentos y la leche maternal.  La eldon de seguimiento con la pediatra debe ser 2 días después de salir del hospital. El bebé deberia opal ganado el peso de nacimiento cuando tiene 10-14 días de tawanna.

## 2022-09-02 NOTE — H&P
UPDATED HISTORY AND PHYSICAL        2022  Subjective:       Erika Vasquez is a 32 y.o.  female   with IUP at 39w0d weeks gestation who presents for scheduled repeat  delivery   .  This IUP is complicated by Hx C/S x1                                                 Hx Preeclampsia previous pregnancy   .    Patient denies contractions, denies vaginal bleeding, denies LOF.   Fetal Movement: normal.     PMHx: No past medical history on file.    PSHx:   Past Surgical History:   Procedure Laterality Date     SECTION      GALLBLADDER SURGERY         All: Review of patient's allergies indicates:  No Known Allergies    Meds:   Medications Prior to Admission   Medication Sig Dispense Refill Last Dose    aspirin (ECOTRIN) 81 MG EC tablet Take 1 tablet (81 mg total) by mouth once daily. 150 tablet 2     DIFLUCAN 150 mg Tab Take one tablet today and one in a week (Patient not taking: No sig reported) 2 tablet 0     nitrofurantoin (MACRODANTIN) 100 MG capsule Take 1 capsule (100 mg total) by mouth nightly. (Patient not taking: No sig reported) 60 capsule 3     ondansetron (ZOFRAN-ODT) 4 MG TbDL Take 1 tablet (4 mg total) by mouth every 6 (six) hours as needed (Nausea). (Patient not taking: No sig reported) 10 tablet 0     ondansetron (ZOFRAN-ODT) 4 MG TbDL Take 1 tablet (4 mg total) by mouth every 8 (eight) hours as needed (nausea). (Patient not taking: No sig reported) 12 tablet 0     prenatal vit,giorgi 74-iron-folic 27 mg iron- 1 mg Tab Take 1 tablet by mouth once daily. 90 tablet 3     promethazine (PHENERGAN) 25 MG tablet Take 1 tablet (25 mg total) by mouth every 6 (six) hours as needed for Nausea. (Patient not taking: No sig reported) 25 tablet 0     pyridoxine, vitamin B6, (B-6) 100 MG Tab Take 1 tablet (100 mg total) by mouth 3 (three) times daily before meals. 60 tablet 3        SH:   Social History     Socioeconomic History    Marital status: Single   Tobacco Use    Smoking  status: Never    Smokeless tobacco: Never   Substance and Sexual Activity    Alcohol use: Not Currently    Drug use: Never    Sexual activity: Yes     Partners: Male       FH:   Family History   Problem Relation Age of Onset    Hypertension Father     Diabetes Father        OBHx:   OB History    Para Term  AB Living   2 1 1 0 0 1   SAB IAB Ectopic Multiple Live Births   0 0 0 0 1      # Outcome Date GA Lbr Ken/2nd Weight Sex Delivery Anes PTL Lv   2 Current            1 Term  38w3d    CS-Unspec   BOBBY      Complications: Pre-eclampsia       Review of Systems: Non contributory      Objective:       LMP 2021 (Approximate)     There were no vitals filed for this visit.    General:   alert, appears stated age, and cooperative   Lungs:   clear to auscultation bilaterally   Heart:   regular rate and rhythm, S1, S2 normal, no murmur, click, rub or gallop   Abdomen:  soft, non-tender; bowel sounds normal; no masses,  no organomegaly   Extremities negative edema, negative erythema   FHT: 155 Cat 1 (reassuring)                 TOCO: Q 0 minutes   Presentations: cephalic by ultrasound   Cervix:     Dilation:     Effacement:     Station:      Consistency:     Position:          Lab Review  Blood Type O POS       Assessment:       39w0d weeks gestation.  Hx C/S x1       Patient Active Problem List   Diagnosis    14 weeks gestation of pregnancy    Encounter for supervision of normal pregnancy in third trimester    History of  section          Plan:      Risks, benefits, alternatives and possible complications have been discussed in detail with the patient.   - Consents signed and to chart  - Admit to Labor and Delivery unit  -Spinal per anesthesia  -Will proceed with scheduled procedure, patient agrees .             Barber Moya M.D.   OB/GYN    2022

## 2022-09-02 NOTE — TRANSFER OF CARE
Anesthesia Transfer of Care Note    Patient: Erika Vasquez    Procedure(s) Performed: Procedure(s) (LRB):   SECTION (N/A)    Patient location: Labor and Delivery    Anesthesia Type: spinal    Transport from OR: Transported from OR on room air with adequate spontaneous ventilation    Post pain: adequate analgesia    Post assessment: no apparent anesthetic complications    Post vital signs: stable    Level of consciousness: awake, alert and oriented    Nausea/Vomiting: no nausea/vomiting    Complications: none    Transfer of care protocol was followed      Last vitals:   Visit Vitals  /69   Pulse 80   LMP 2021 (Approximate)   SpO2 100%   Breastfeeding No

## 2022-09-02 NOTE — L&D DELIVERY NOTE
[facility] - Labor & Delivery   Section   Operative Note        OPERATIVE NOTE            Surgery Date: 2022     SURGEON: Barber Moya   ASSISTANT: Esetlla Bowden                           PREOP DIAGNOSIS: IUP @ 39 WGA                                          Hx C/S x1       POSTOP DIAGNOSIS:                                           The same     PROCEDURES:  Low transverse  section   ANESTHESIA: spinal    FINDINGS/KEY COMPONENTS:   One viable  sex   F    Apgar 9/9  Normal  ,placenta , clear amniotic fluid  Normal  Uterus, tubes and ovaries     ESTIMATED BLOOD LOSS: 400cc    UOP:   Urine clear at the end of procedure     COMPLICATIONS: None    SPECIMEN / PATHOLOGY:   Specimens     None        PROCEDURE:    The patient was taken to the operating room where spinal anesthesia was found to be adequate.  She was prepped and draped in the normal sterile fashion.  Pfannensteil skin incision was made with the scalpel and carried down to the lower layer of fascia with the scalpel.  The fascia was incised in the mid-line.  This incision was extended laterally with the coffman scissors.  The superior aspect of the incision was grasped with Ochsner clamps, tented up, and the underlying muscles were dissected off bluntly.  The inferior aspect of the incision was grasped with the Ochsner clamps and the underlying muscles were dissected off bluntly.  The muscles were  in the mid-line.  The peritoneum was grasped with hemostats and entered in bluntly.  This incision was extended superiorly and inferiorly with good visualization of the bladder. Bladder flap was done, with Metzenbaum scissors, and bladder was pushed down exposing the  uterine segment.    The bladder blade was inserted.  A low transverse incision was made on the uterus.  This incision was extended laterally with finger fracture.  The infant's head was delivered atraumatically.  The cord was clamped and cut.  The infant was handed to  the waiting nurse. Umbilical cord blood sample obtained   The placenta was delivered manually   The uterus was exteriorized and cleared of all clots and debris.  The uterine incision was repaired with #1 Chromic in a running, locked fashion.  Excellent hemostasis was noted.  The posterior gutter was cleared of all clots and debris.  The uterus was returned to the abdomen.  The incision was again inspected and found to be hemostatic.  The peritoneum was reapproximated with 2-0 Vicryl in a running fashion.  The muscle was reapproximated in a mattress fashion with #1 Chromic.  The fascia was reapproximated with #1 PDS in a running fashion.  The subcutaneous tissue was reapproximated with 2-0 Plain catgut .  Skin was closed with ( x) staples / (-)  4-0 Monocryl in a subcuticular fashion.    Sponge, lap, needle counts were correct x 2.  Patient was taken to the recover room awake and in stable condition with the Henry draining clear urine.      Barber Moya M.D.   OB/GYN    2022       Specimens:   Specimen (24h ago, onward)      None            Condition: Good    Disposition: PACU - hemodynamically stable.    Attestation: Good         Delivery Information for A Girl Erika Vasquez    Birth information:  YOB: 2021   Time of birth: 10:31 AM   Sex: female   Head Delivery Date/Time: 2022 10:31 AM   Delivery type: , Low Transverse   Gestational Age: 39w0d    Delivery Providers    Delivering clinician: Bimal Moya MD   Provider Role    HEMANT Ahn RN Lisa Scozzari [facility]     El Gagnon MD               Measurements    Weight:   Length:          Apgars    Living status: Living  Apgars:  1 min.:  5 min.:  10 min.:  15 min.:  20 min.:    Skin color:  1  1       Heart rate:  2  2       Reflex irritability:  2  2       Muscle tone:  2  2       Respiratory effort:  2  2       Total:  9  9       Apgars assigned by: LINSEY MORROW RN              Shoulder Dystocia    Shoulder dystocia present?: No                 Interventions/Resuscitation    Method: Bulb Suctioning, Tactile Stimulation       Cord    Vessels: 3 vessels  Complications: None  Delayed Cord Clamping?: No  Cord Blood Disposition: Lab, Sent with Baby  Gases Sent?: No  Stem Cell Collection (by MD): No       Placenta    Placenta delivery date/time: 2022 1031  Placenta removal: Manual removal  Placenta appearance: Intact  Placenta disposition: discarded           Labor Events:       labor:       Labor Onset Date/Time:         Dilation Complete Date/Time:         Start Pushing Date/Time:         Start Pushing Date/Time:       Rupture Date/Time:            Rupture type:            Fluid Amount:         Fluid Color:         Fluid Odor:         Membrane Status:                  steroids:       Antibiotics given for GBS:       Induction:       Indications for induction:        Augmentation:       Indications for augmentation:       Labor complications:       Additional complications:          Cervical ripening:                     Delivery:      Episiotomy:       Indication for Episiotomy:       Perineal Lacerations:   Repaired:      Periurethral Laceration:   Repaired:     Labial Laceration:   Repaired:     Sulcus Laceration:   Repaired:     Vaginal Laceration:   Repaired:     Cervical Laceration:   Repaired:     Repair suture:       Repair # of packets:       Last Value - EBL - Nursing (mL):       Sum - EBL - Nursing (mL): 0     Last Value - EBL - Anesthesia (mL):        Calculated QBL (mL):         Vaginal Sweep Performed:       Surgicount Correct:         Other providers:       Anesthesia    Method: Spinal          Details (if applicable):  Trial of Labor No    Categorization: Repeat    Priority: Routine   Indications for : Repeat Section   Incision Type: low transverse     Additional  information:  Forceps:    Vacuum:    Breech:     Observed anomalies    Other (Comments):         Barber Moya M.D.   OB/GYN    9/2/2022

## 2022-09-02 NOTE — LACTATION NOTE
Rounded on couplet. BF basics reviewed. Mom reported nipples karolina well for breastfeeding, then seemed unsure. Asked for permission to see nipples and mom showed to this RN. Both nipples somewhat flat. Mom was given nipple shells and explained use for shells.  Mom stated that baby seemed to be latching only onto nipple. Explained importance of deep latch/strong sucks/swallows.Encouraged mom to try to achieve deeper latch while breastfeeding and instructed mom to call for latch assistance with next feeding. Mom verbalized understanding.     [facility]  Lactation Note - Mom    SUMMARY     Maternal Assessment    Breast Density: Bilateral:, soft  Areola: Bilateral:, elastic  Nipples: Bilateral:, flat  Left Nipple Symptoms:  (denies pain)  Right Nipple Symptoms: other (see comments) (denies pain)      LATCH Score         Breasts WDL    Breast WDL: WDL  Left Nipple Symptoms:  (denies pain)  Right Nipple Symptoms: other (see comments) (denies pain)    Maternal Infant Feeding    Maternal Preparation: breast care  Maternal Emotional State: independent, relaxed  Pain with Feeding: no  Comfort Measures Following Feeding: air-drying encouraged  Latch Assistance:  (encouraged to call for latch assist prn)    Lactation Referrals    Community Referrals: outpatient lactation program  Outpatient Lactation Program Lactation Follow-up Date/Time: Call lact ctr prn    Lactation Interventions    Breast Care: Breastfeeding: open to air  Breastfeeding Assistance: feeding cue recognition promoted, feeding on demand promoted, support offered, nipple shell utilized  Breast Care: Breastfeeding: open to air  Breastfeeding Assistance: feeding cue recognition promoted, feeding on demand promoted, support offered, nipple shell utilized  Breastfeeding Support: diary/feeding log utilized, encouragement provided, lactation counseling provided       Breastfeeding Session         Maternal Information

## 2022-09-02 NOTE — NURSING
Patient arrived to unit for scheduled c/s. Placed in room 357, instructed on hibiclens shower. Instructed to call nurse when shower completed.

## 2022-09-02 NOTE — ANESTHESIA PREPROCEDURE EVALUATION
09/02/2022  Erika Vasquez is a 32 y.o., female.      Pre-op Assessment    I have reviewed the Patient Summary Reports.     I have reviewed the Nursing Notes. I have reviewed the NPO Status.   I have reviewed the Medications.     Review of Systems  Anesthesia Hx:  No problems with previous Anesthesia    Cardiovascular:  Cardiovascular Normal     Pulmonary:  Pulmonary Normal    Hepatic/GI:  Hepatic/GI Normal    Endocrine:  Endocrine Normal        Physical Exam  General: Well nourished, Cooperative, Alert and Oriented    Airway:  Mallampati: II   Mouth Opening: Normal  TM Distance: Normal  Tongue: Normal    Chest/Lungs:  Clear to auscultation, Normal Respiratory Rate    Heart:  Rate: Normal  Rhythm: Regular Rhythm  Sounds: Normal        Anesthesia Plan  Type of Anesthesia, risks & benefits discussed:    Anesthesia Type: Spinal  Intra-op Monitoring Plan: Standard ASA Monitors  Post Op Pain Control Plan: multimodal analgesia  Informed Consent: Informed consent signed with the Patient and all parties understand the risks and agree with anesthesia plan.  All questions answered.   ASA Score: 3 Emergent    Ready For Surgery From Anesthesia Perspective.     .

## 2022-09-02 NOTE — ANESTHESIA PROCEDURE NOTES
Spinal    Diagnosis: intrauterine pregnancy  Patient location during procedure: OR  Start time: 9/2/2022 10:14 AM  Timeout: 9/2/2022 10:14 AM  End time: 9/2/2022 10:16 AM    Staffing  Authorizing Provider: Darrion Avendaño MD  Performing Provider: El Gagnon MD    Preanesthetic Checklist  Completed: patient identified, IV checked, site marked, risks and benefits discussed, surgical consent, monitors and equipment checked, pre-op evaluation and timeout performed  Spinal Block  Patient position: sitting  Prep: ChloraPrep  Patient monitoring: heart rate, cardiac monitor, frequent blood pressure checks and continuous pulse ox  Approach: midline  Location: L3-4  Injection technique: single shot  CSF Fluid: clear free-flowing CSF  Needle  Needle type: pencil-tip   Needle gauge: 25 G  Needle length: 3.5 in  Additional Documentation: incremental injection, negative aspiration for heme and no paresthesia on injection  Needle localization: anatomical landmarks

## 2022-09-03 LAB
BASOPHILS # BLD AUTO: 0.04 K/UL (ref 0–0.2)
BASOPHILS NFR BLD: 0.4 % (ref 0–1.9)
DIFFERENTIAL METHOD: ABNORMAL
EOSINOPHIL # BLD AUTO: 0.1 K/UL (ref 0–0.5)
EOSINOPHIL NFR BLD: 1.3 % (ref 0–8)
ERYTHROCYTE [DISTWIDTH] IN BLOOD BY AUTOMATED COUNT: 14.6 % (ref 11.5–14.5)
HCT VFR BLD AUTO: 30.5 % (ref 37–48.5)
HGB BLD-MCNC: 10.3 G/DL (ref 12–16)
IMM GRANULOCYTES # BLD AUTO: 0.06 K/UL (ref 0–0.04)
IMM GRANULOCYTES NFR BLD AUTO: 0.6 % (ref 0–0.5)
LYMPHOCYTES # BLD AUTO: 1.9 K/UL (ref 1–4.8)
LYMPHOCYTES NFR BLD: 18.3 % (ref 18–48)
MCH RBC QN AUTO: 27 PG (ref 27–31)
MCHC RBC AUTO-ENTMCNC: 33.8 G/DL (ref 32–36)
MCV RBC AUTO: 80 FL (ref 82–98)
MONOCYTES # BLD AUTO: 0.9 K/UL (ref 0.3–1)
MONOCYTES NFR BLD: 8.8 % (ref 4–15)
NEUTROPHILS # BLD AUTO: 7.4 K/UL (ref 1.8–7.7)
NEUTROPHILS NFR BLD: 70.6 % (ref 38–73)
NRBC BLD-RTO: 0 /100 WBC
PLATELET # BLD AUTO: 188 K/UL (ref 150–450)
PMV BLD AUTO: 12.6 FL (ref 9.2–12.9)
RBC # BLD AUTO: 3.81 M/UL (ref 4–5.4)
WBC # BLD AUTO: 10.5 K/UL (ref 3.9–12.7)

## 2022-09-03 PROCEDURE — 11000001 HC ACUTE MED/SURG PRIVATE ROOM

## 2022-09-03 PROCEDURE — 25000003 PHARM REV CODE 250: Performed by: STUDENT IN AN ORGANIZED HEALTH CARE EDUCATION/TRAINING PROGRAM

## 2022-09-03 PROCEDURE — 99231 SBSQ HOSP IP/OBS SF/LOW 25: CPT | Mod: ,,, | Performed by: OBSTETRICS & GYNECOLOGY

## 2022-09-03 PROCEDURE — 63600175 PHARM REV CODE 636 W HCPCS: Performed by: STUDENT IN AN ORGANIZED HEALTH CARE EDUCATION/TRAINING PROGRAM

## 2022-09-03 PROCEDURE — 25000003 PHARM REV CODE 250: Performed by: OBSTETRICS & GYNECOLOGY

## 2022-09-03 PROCEDURE — 85025 COMPLETE CBC W/AUTO DIFF WBC: CPT | Performed by: OBSTETRICS & GYNECOLOGY

## 2022-09-03 PROCEDURE — 99231 PR SUBSEQUENT HOSPITAL CARE,LEVL I: ICD-10-PCS | Mod: ,,, | Performed by: OBSTETRICS & GYNECOLOGY

## 2022-09-03 PROCEDURE — 36415 COLL VENOUS BLD VENIPUNCTURE: CPT | Performed by: OBSTETRICS & GYNECOLOGY

## 2022-09-03 RX ADMIN — ACETAMINOPHEN 650 MG: 325 TABLET ORAL at 12:09

## 2022-09-03 RX ADMIN — NAPROXEN 500 MG: 500 TABLET ORAL at 08:09

## 2022-09-03 RX ADMIN — DOCUSATE SODIUM 200 MG: 100 CAPSULE, LIQUID FILLED ORAL at 08:09

## 2022-09-03 RX ADMIN — OXYCODONE HYDROCHLORIDE AND ACETAMINOPHEN 1 TABLET: 10; 325 TABLET ORAL at 08:09

## 2022-09-03 RX ADMIN — ACETAMINOPHEN 650 MG: 325 TABLET ORAL at 06:09

## 2022-09-03 RX ADMIN — MUPIROCIN: 20 OINTMENT TOPICAL at 08:09

## 2022-09-03 RX ADMIN — ACETAMINOPHEN 650 MG: 325 TABLET ORAL at 11:09

## 2022-09-03 RX ADMIN — KETOROLAC TROMETHAMINE 30 MG: 30 INJECTION, SOLUTION INTRAMUSCULAR; INTRAVENOUS at 12:09

## 2022-09-03 RX ADMIN — KETOROLAC TROMETHAMINE 30 MG: 30 INJECTION, SOLUTION INTRAMUSCULAR; INTRAVENOUS at 06:09

## 2022-09-03 RX ADMIN — SODIUM CHLORIDE: 0.9 INJECTION, SOLUTION INTRAVENOUS at 06:09

## 2022-09-03 RX ADMIN — PRENATAL VIT W/ FE FUMARATE-FA TAB 27-0.8 MG 1 TABLET: 27-0.8 TAB at 08:09

## 2022-09-03 NOTE — LACTATION NOTE
Pt reports she feels burning, pressure and pain during urinating.   Informed oncoming assigned RNSAMMY of pt's complaints.

## 2022-09-03 NOTE — PLAN OF CARE
Pt ambulated to restroom to shower with minimal assist. IV fluids d/c'ed and IV saline locked prior to getting up. Pt in shower with nursing assistant in room and SO at bedside.

## 2022-09-03 NOTE — PROGRESS NOTES
POD #1 s/p     Subjective: No complaints. Doing well    Objective: BP (!) 131/58 (Patient Position: Lying)   Pulse 72   Temp 97.9 °F (36.6 °C) (Oral)   Resp 17   LMP 2021 (Approximate)   SpO2 97%   Breastfeeding Yes     I/O last 3 completed shifts:  In: 2280.7 [I.V.:430.7; IV Piggyback:1850]  Out: 2381 [Urine:1975; Blood:406]  No intake/output data recorded.        H/H:   Lab Results   Component Value Date    WBC 10.50 2022    HGB 10.3 (L) 2022    HCT 30.5 (L) 2022    MCV 80 (L) 2022     2022       Chest: Clear to auscultation  CV: Regular rate and rhythm  Abdomen: Non-tender, non-distended, soft, positive bowel sounds  Incision: Clean, dry, intact  Extremities: Non-tender, no edema    Assessment:POD #1 S/P     Plan: Routine progressive care  possible d/c carlos

## 2022-09-03 NOTE — PLAN OF CARE
Mother will breastfeed on cue at least eight or more times in 24 hours. Will supplement with formula as needed for hyperbili. Will keep track of feedings and wet and dirty diapers. Will call with any breastfeeding needs.

## 2022-09-03 NOTE — LACTATION NOTE
[facility] - Mother & Baby  Lactation Note - Mom    SUMMARY     Maternal Assessment    Breast Density: Bilateral:, soft (per pt)  Areola: Bilateral:, elastic  Nipples: Bilateral:, flat  Left Nipple Symptoms:  (denies pain)  Right Nipple Symptoms:  (denies pain)      LATCH Score         Breasts WDL    Breast WDL: WDL (per pt)  Left Nipple Symptoms:  (denies pain)  Right Nipple Symptoms:  (denies pain)    Maternal Infant Feeding    Maternal Preparation: breast care  Maternal Emotional State: relaxed  Pain with Feeding: no  Comfort Measures Following Feeding: air-drying encouraged  Latch Assistance: other (see comments) (offered, pt states baby latches without difficulty)    Lactation Referrals    Community Referrals: outpatient lactation program  Outpatient Lactation Program Lactation Follow-up Date/Time: Call lact ctr prn    Lactation Interventions    Breast Care: Breastfeeding: open to air  Breastfeeding Assistance: support offered, feeding cue recognition promoted, feeding on demand promoted  Breast Care: Breastfeeding: open to air  Breastfeeding Assistance: support offered, feeding cue recognition promoted, feeding on demand promoted  Breastfeeding Support: encouragement provided       Breastfeeding Session         Maternal Information

## 2022-09-03 NOTE — LACTATION NOTE
Pt states baby is latching and breastfeeding without difficulty. Reports she supplements with formula as well. Phototherapy continues at this time. Encouraged breastfeeding but limiting to less than 30 minutes due to need for phototherapy. Encouraged to supplement baby while she is back under the lights to maximize exposure as ordered. Denies any breastfeeding questions or concerns.

## 2022-09-04 LAB
BACTERIA #/AREA URNS HPF: ABNORMAL /HPF
BILIRUB UR QL STRIP: NEGATIVE
CLARITY UR: ABNORMAL
COLOR UR: YELLOW
GLUCOSE UR QL STRIP: ABNORMAL
HGB UR QL STRIP: ABNORMAL
HYALINE CASTS #/AREA URNS LPF: 2 /LPF
KETONES UR QL STRIP: NEGATIVE
LEUKOCYTE ESTERASE UR QL STRIP: ABNORMAL
MICROSCOPIC COMMENT: ABNORMAL
NITRITE UR QL STRIP: NEGATIVE
PH UR STRIP: 6 [PH] (ref 5–8)
PROT UR QL STRIP: ABNORMAL
RBC #/AREA URNS HPF: >100 /HPF (ref 0–4)
RPR SER QL: NORMAL
SP GR UR STRIP: 1.01 (ref 1–1.03)
SQUAMOUS #/AREA URNS HPF: 6 /HPF
UNIDENT CRYS URNS QL MICRO: 29
URN SPEC COLLECT METH UR: ABNORMAL
UROBILINOGEN UR STRIP-ACNC: NEGATIVE EU/DL
WBC #/AREA URNS HPF: 33 /HPF (ref 0–5)

## 2022-09-04 PROCEDURE — 11000001 HC ACUTE MED/SURG PRIVATE ROOM

## 2022-09-04 PROCEDURE — 81000 URINALYSIS NONAUTO W/SCOPE: CPT | Performed by: OBSTETRICS & GYNECOLOGY

## 2022-09-04 PROCEDURE — 25000003 PHARM REV CODE 250: Performed by: OBSTETRICS & GYNECOLOGY

## 2022-09-04 PROCEDURE — 99238 HOSP IP/OBS DSCHRG MGMT 30/<: CPT | Mod: ,,, | Performed by: OBSTETRICS & GYNECOLOGY

## 2022-09-04 PROCEDURE — 87086 URINE CULTURE/COLONY COUNT: CPT | Performed by: OBSTETRICS & GYNECOLOGY

## 2022-09-04 PROCEDURE — 99238 PR HOSPITAL DISCHARGE DAY,<30 MIN: ICD-10-PCS | Mod: ,,, | Performed by: OBSTETRICS & GYNECOLOGY

## 2022-09-04 RX ORDER — NITROFURANTOIN 25; 75 MG/1; MG/1
100 CAPSULE ORAL 2 TIMES DAILY
Qty: 14 CAPSULE | Refills: 0 | Status: SHIPPED | OUTPATIENT
Start: 2022-09-04 | End: 2022-09-05

## 2022-09-04 RX ORDER — IBUPROFEN 600 MG/1
600 TABLET ORAL 3 TIMES DAILY
Qty: 40 TABLET | Refills: 1 | Status: SHIPPED | OUTPATIENT
Start: 2022-09-04 | End: 2022-09-05

## 2022-09-04 RX ORDER — OXYCODONE AND ACETAMINOPHEN 5; 325 MG/1; MG/1
1 TABLET ORAL EVERY 4 HOURS PRN
Qty: 30 TABLET | Refills: 0 | Status: SHIPPED | OUTPATIENT
Start: 2022-09-04 | End: 2022-09-05

## 2022-09-04 RX ORDER — NITROFURANTOIN 25; 75 MG/1; MG/1
100 CAPSULE ORAL EVERY 12 HOURS
Status: DISCONTINUED | OUTPATIENT
Start: 2022-09-04 | End: 2022-09-05 | Stop reason: HOSPADM

## 2022-09-04 RX ADMIN — PRENATAL VIT W/ FE FUMARATE-FA TAB 27-0.8 MG 1 TABLET: 27-0.8 TAB at 08:09

## 2022-09-04 RX ADMIN — MUPIROCIN: 20 OINTMENT TOPICAL at 08:09

## 2022-09-04 RX ADMIN — DOCUSATE SODIUM 200 MG: 100 CAPSULE, LIQUID FILLED ORAL at 08:09

## 2022-09-04 RX ADMIN — OXYCODONE HYDROCHLORIDE AND ACETAMINOPHEN 1 TABLET: 5; 325 TABLET ORAL at 12:09

## 2022-09-04 RX ADMIN — NAPROXEN 500 MG: 500 TABLET ORAL at 08:09

## 2022-09-04 RX ADMIN — OXYCODONE HYDROCHLORIDE AND ACETAMINOPHEN 1 TABLET: 5; 325 TABLET ORAL at 08:09

## 2022-09-04 RX ADMIN — OXYCODONE HYDROCHLORIDE AND ACETAMINOPHEN 1 TABLET: 10; 325 TABLET ORAL at 05:09

## 2022-09-04 RX ADMIN — NITROFURANTOIN (MONOHYDRATE/MACROCRYSTALS) 100 MG: 75; 25 CAPSULE ORAL at 08:09

## 2022-09-04 RX ADMIN — NAPROXEN 500 MG: 500 TABLET ORAL at 05:09

## 2022-09-04 RX ADMIN — NITROFURANTOIN (MONOHYDRATE/MACROCRYSTALS) 100 MG: 75; 25 CAPSULE ORAL at 09:09

## 2022-09-04 RX ADMIN — OXYCODONE HYDROCHLORIDE AND ACETAMINOPHEN 1 TABLET: 10; 325 TABLET ORAL at 04:09

## 2022-09-04 NOTE — NURSING
Discussed with pt possibilities of why she may feel burning when urinating.  Encouraged pt to increase fluid intake and to let us know if it doesn't get any better.  Pt verbalized understanding.  JAME

## 2022-09-04 NOTE — DISCHARGE SUMMARY
Delivery Discharge Summary  Obstetrics      Primary OB Clinician: Enrrique Brush MD    Admission date: 2022  Discharge date: 22    Admit Dx:   Patient Active Problem List   Diagnosis    Encounter for supervision of normal pregnancy in third trimester    History of  section    39 weeks gestation of pregnancy     Discharge Dx:   Patient Active Problem List   Diagnosis    Encounter for supervision of normal pregnancy in third trimester    History of  section    39 weeks gestation of pregnancy       Procedure: , due to prior c/s    Hospital Course:  Pt is a 32 y.o. now  by , due to prior c/s , POD # 3 was admitted on 2022. On initial assessment, vital signs were stable and physical exam was Normal.  Patient was subsequently admitted to labor and delivery unit with signed consents.  Patient subsequently delivered a viable infant, please see delivery information below or full delivery note for further details. Pt was in stable condition post delivery and was transferred to the Mother-Baby Unit in a stable condition. Her postpartum course was uncomplicated. On discharge day, patient's pain is well  controlled with oral pain medications. Pt is tolerating ambulation without SOB or CP, and PO diet without N/V. Reports lochia is minimal in degree. Denies any HA, vision changes, F/C, LE swelling. Denies any breast pain/soreness. Has some UTI sx's so sent a urine cx and will d/c with Macrobid  Pt in stable condition and ready for discharge, instructed to continue PNV, Iron supplement, and to follow up in the OB clinic in 4-6 weeks with Dr. Moya.      Delivery:    Episiotomy:     Lacerations:     Repair suture:     Repair # of packets:     Blood loss (ml):       Birth information:  YOB: 2021   Time of birth: 10:31 AM   Sex: female   Delivery type: , Low Transverse   Gestational Age: 39w0d    Delivery Clinician:      Other providers:       Additional   information:  Forceps:    Vacuum:    Breech:    Observed anomalies      Living?:           APGARS  One minute Five minutes Ten minutes   Skin color:         Heart rate:         Grimace:         Muscle tone:         Breathing:         Totals: 9  9        Placenta: Delivered:       appearance    Patient Instructions:   Current Discharge Medication List        CONTINUE these medications which have NOT CHANGED    Details   aspirin (ECOTRIN) 81 MG EC tablet Take 1 tablet (81 mg total) by mouth once daily.  Qty: 150 tablet, Refills: 2    Associated Diagnoses: Amenorrhea; Previous  section; Hx of preeclampsia, prior pregnancy, currently pregnant      DIFLUCAN 150 mg Tab Take one tablet today and one in a week  Qty: 2 tablet, Refills: 0    Associated Diagnoses: Yeast infection      nitrofurantoin (MACRODANTIN) 100 MG capsule Take 1 capsule (100 mg total) by mouth nightly.  Qty: 60 capsule, Refills: 3      !! ondansetron (ZOFRAN-ODT) 4 MG TbDL Take 1 tablet (4 mg total) by mouth every 6 (six) hours as needed (Nausea).  Qty: 10 tablet, Refills: 0      !! ondansetron (ZOFRAN-ODT) 4 MG TbDL Take 1 tablet (4 mg total) by mouth every 8 (eight) hours as needed (nausea).  Qty: 12 tablet, Refills: 0      prenatal vit,giorgi 74-iron-folic 27 mg iron- 1 mg Tab Take 1 tablet by mouth once daily.  Qty: 90 tablet, Refills: 3      promethazine (PHENERGAN) 25 MG tablet Take 1 tablet (25 mg total) by mouth every 6 (six) hours as needed for Nausea.  Qty: 25 tablet, Refills: 0      pyridoxine, vitamin B6, (B-6) 100 MG Tab Take 1 tablet (100 mg total) by mouth 3 (three) times daily before meals.  Qty: 60 tablet, Refills: 3       !! - Potential duplicate medications found. Please discuss with provider.          No discharge procedures on file.   D/C pt. To home in stable condition  Reg diet  Ad danya activity with pelvic rest x 6 wks  Call for fever >101, heavy vag bleeding, pain uncontrolled w/ pain meds  F/u in office in 1 week for staple  removal and  6-8wks for final check-up  Motrin 800mg, Percocet 5/325mg  Enrrique Simon MD       Addendum  Pt initially d/c'd on 9/4 but d/c held due to baby needing to stay an extra night  Pt reassesd today and ready to go.  UTI sx's improving with macrobid, will d/c as above

## 2022-09-04 NOTE — PLAN OF CARE
Patient is breastfeeding well, pain managed by medications prescribed by MD. Pt is passing gas and voiding. VSS, nad

## 2022-09-04 NOTE — NURSING
Pt states that she is feeling better, but does still have some burning on urination, but not like before.  Will discuss with md when called for updates on pts.  JAME

## 2022-09-05 VITALS
SYSTOLIC BLOOD PRESSURE: 106 MMHG | RESPIRATION RATE: 18 BRPM | OXYGEN SATURATION: 97 % | HEART RATE: 69 BPM | DIASTOLIC BLOOD PRESSURE: 55 MMHG | TEMPERATURE: 98 F

## 2022-09-05 LAB — BACTERIA UR CULT: NO GROWTH

## 2022-09-05 PROCEDURE — 90686 IIV4 VACC NO PRSV 0.5 ML IM: CPT | Performed by: OBSTETRICS & GYNECOLOGY

## 2022-09-05 PROCEDURE — 25000003 PHARM REV CODE 250: Performed by: OBSTETRICS & GYNECOLOGY

## 2022-09-05 PROCEDURE — 90471 IMMUNIZATION ADMIN: CPT | Performed by: OBSTETRICS & GYNECOLOGY

## 2022-09-05 PROCEDURE — 63600175 PHARM REV CODE 636 W HCPCS: Performed by: OBSTETRICS & GYNECOLOGY

## 2022-09-05 RX ORDER — IBUPROFEN 600 MG/1
600 TABLET ORAL 3 TIMES DAILY
Qty: 40 TABLET | Refills: 2 | OUTPATIENT
Start: 2022-09-05 | End: 2022-09-23

## 2022-09-05 RX ORDER — IBUPROFEN 600 MG/1
600 TABLET ORAL 3 TIMES DAILY
Qty: 40 TABLET | Refills: 2 | Status: SHIPPED | OUTPATIENT
Start: 2022-09-05 | End: 2022-09-05 | Stop reason: SDUPTHER

## 2022-09-05 RX ORDER — NITROFURANTOIN 25; 75 MG/1; MG/1
100 CAPSULE ORAL 2 TIMES DAILY
Qty: 14 CAPSULE | Refills: 0 | Status: SHIPPED | OUTPATIENT
Start: 2022-09-05 | End: 2022-09-05 | Stop reason: SDUPTHER

## 2022-09-05 RX ORDER — OXYCODONE AND ACETAMINOPHEN 5; 325 MG/1; MG/1
1 TABLET ORAL EVERY 4 HOURS PRN
Qty: 20 TABLET | Refills: 0 | Status: SHIPPED | OUTPATIENT
Start: 2022-09-05 | End: 2023-10-31

## 2022-09-05 RX ORDER — OXYCODONE AND ACETAMINOPHEN 5; 325 MG/1; MG/1
1 TABLET ORAL EVERY 4 HOURS PRN
Qty: 20 TABLET | Refills: 0 | Status: SHIPPED | OUTPATIENT
Start: 2022-09-05 | End: 2022-09-05 | Stop reason: SDUPTHER

## 2022-09-05 RX ORDER — NITROFURANTOIN 25; 75 MG/1; MG/1
100 CAPSULE ORAL 2 TIMES DAILY
Qty: 14 CAPSULE | Refills: 0 | Status: SHIPPED | OUTPATIENT
Start: 2022-09-05 | End: 2022-09-12

## 2022-09-05 RX ADMIN — NITROFURANTOIN (MONOHYDRATE/MACROCRYSTALS) 100 MG: 75; 25 CAPSULE ORAL at 08:09

## 2022-09-05 RX ADMIN — DOCUSATE SODIUM 200 MG: 100 CAPSULE, LIQUID FILLED ORAL at 08:09

## 2022-09-05 RX ADMIN — MUPIROCIN: 20 OINTMENT TOPICAL at 08:09

## 2022-09-05 RX ADMIN — PRENATAL VIT W/ FE FUMARATE-FA TAB 27-0.8 MG 1 TABLET: 27-0.8 TAB at 08:09

## 2022-09-05 RX ADMIN — INFLUENZA VIRUS VACCINE 0.5 ML: 15; 15; 15; 15 SUSPENSION INTRAMUSCULAR at 11:09

## 2022-09-05 RX ADMIN — OXYCODONE HYDROCHLORIDE AND ACETAMINOPHEN 1 TABLET: 5; 325 TABLET ORAL at 02:09

## 2022-09-05 NOTE — PLAN OF CARE
Patient ambulating independently. POC reviewed with pt in Kinyarw[place]; able to verbalize acceptance and understanding. Support of significant other noted at bedside. Tolerating regular diet.  LTV incision with aquacel dressing c/d/i. Voiding and passing flatus.  Pain well controlled with ordered medications. Lochia rubra and light.  Fundus firm. Bonding with baby AEB holding, feeding, dressing, and changing baby's diaper. VSS. NAD noted. WCTM.

## 2022-09-06 ENCOUNTER — TELEPHONE (OUTPATIENT)
Dept: OBSTETRICS AND GYNECOLOGY | Facility: HOSPITAL | Age: 32
End: 2022-09-06
Payer: MEDICAID

## 2022-09-06 NOTE — ANESTHESIA POSTPROCEDURE EVALUATION
Anesthesia Post Evaluation    Patient: Erika Vasquez    Procedure(s) Performed: Procedure(s) (LRB):   SECTION (N/A)    Final Anesthesia Type: spinal      Patient location during evaluation: PACU  Patient participation: Yes- Able to Participate  Level of consciousness: awake and alert, oriented and awake  Post-procedure vital signs: reviewed and stable  Pain management: adequate  Airway patency: patent    PONV status at discharge: No PONV  Anesthetic complications: no      Cardiovascular status: blood pressure returned to baseline  Respiratory status: unassisted and room air  Hydration status: euvolemic  Follow-up not needed.          Vitals Value Taken Time   /55 22 0805   Temp 36.6 °C (97.8 °F) 22 08   Pulse 69 22 0805   Resp 18 22 0805   SpO2 97 % 22 08         Event Time   Out of Recovery 2022 11:15:00         Pain/Xavi Score: Pain Rating Prior to Med Admin: 6 (2022  2:00 AM)  Pain Rating Post Med Admin: 0 (2022  3:00 AM)

## 2022-09-06 NOTE — TELEPHONE ENCOUNTER
"f/u call placed to pt using Language Line Micronesian interpeter "Monica" ID#[id number].   Reports going well. Still BR/FF. Denies needs/questions. Reports milk came in yesterday and was running fever. Didn't measure temperature. States this morning she drank milk then  and felt like she had more milk. Discussed mastitis prevention/management, s/s. Discussed engorgement measures. Encouraged to continue to log feeds/diaper output until seeing ped. Reports has no appt scheduled bc unable to get appt this week. Asking for recommendations for ped. Stressed importance of f/u since babywas justina+ under phototherapy, given information for 2 pediatricians; Dr. Gisselle Ledesma, Dr. Rafiq Arroyo. Encouraged to call now to get appt ASAP. Lactation center phone number given an dencouraged to call back for any questions/concerns.  Dc teaching done. Verbalized understanding. Thankful for call.   "

## 2022-09-08 ENCOUNTER — TELEPHONE (OUTPATIENT)
Dept: OBSTETRICS AND GYNECOLOGY | Facility: CLINIC | Age: 32
End: 2022-09-08
Payer: MEDICAID

## 2022-09-08 NOTE — TELEPHONE ENCOUNTER
Spoke to pt and informed her that we would see her tomorrow at 1p.m for bandage removal. Pt verbalized understanding.

## 2022-09-08 NOTE — TELEPHONE ENCOUNTER
----- Message from Shahab Dang sent at 9/8/2022  2:09 PM CDT -----  Type:  Needs Medical Advice    Who Called: self  Reason:schedule PP follow up  Would the patient rather a call back or a response via [facility]? call  Best Call Back Number: [phone]  Additional Information: non

## 2022-09-09 ENCOUNTER — POSTPARTUM VISIT (OUTPATIENT)
Dept: OBSTETRICS AND GYNECOLOGY | Facility: CLINIC | Age: 32
End: 2022-09-09
Payer: MEDICAID

## 2022-09-09 VITALS
WEIGHT: 231.06 LBS | BODY MASS INDEX: 40.94 KG/M2 | SYSTOLIC BLOOD PRESSURE: 110 MMHG | TEMPERATURE: 99 F | DIASTOLIC BLOOD PRESSURE: 75 MMHG

## 2022-09-09 DIAGNOSIS — Z09 SURGICAL FOLLOW-UP CARE: Primary | ICD-10-CM

## 2022-09-09 PROCEDURE — 99999 PR PBB SHADOW E&M-EST. PATIENT-LVL III: CPT | Mod: PBBFAC,,, | Performed by: NURSE PRACTITIONER

## 2022-09-09 PROCEDURE — 99024 PR POST-OP FOLLOW-UP VISIT: ICD-10-PCS | Mod: ,,, | Performed by: NURSE PRACTITIONER

## 2022-09-09 PROCEDURE — 99213 OFFICE O/P EST LOW 20 MIN: CPT | Mod: PBBFAC,PO | Performed by: NURSE PRACTITIONER

## 2022-09-09 PROCEDURE — 99999 PR PBB SHADOW E&M-EST. PATIENT-LVL III: ICD-10-PCS | Mod: PBBFAC,,, | Performed by: NURSE PRACTITIONER

## 2022-09-09 PROCEDURE — 99024 POSTOP FOLLOW-UP VISIT: CPT | Mod: ,,, | Performed by: NURSE PRACTITIONER

## 2022-09-09 NOTE — PROGRESS NOTES
CC: Incision check    Preferred language = Uzbek, language line used during visit.      Erika Vasquez is a 32 y.o. female  who presents for  incision check. She is S/P low transverse  on 2022.  No bowel or urinary complaints. No leg or calf pain. Has some burning to incision site.    Delivery Date: 2022  Delivery MD: Barber Moya   Gender: female      /75   Temp 98.9 °F (37.2 °C) (Oral)   Wt 104.8 kg (231 lb 0.7 oz)   LMP 2021 (Approximate)   BMI 40.94 kg/m²     ROS:  GENERAL: No fever, fatigability. Reports waking up this morning feeling cold.  ABDOMEN: Denies nausea. Denies vomiting. No diarrhea. No constipation  BREAST: Denies pain. No lumps. No discharge.  URINARY: No incontinence, no nocturia, no frequency and no dysuria.  CARDIOVASCULAR: No chest pain. No shortness of breath. No leg cramps.  NEUROLOGICAL: No headaches. No vision changes.    PHYSICAL EXAM:  APPEARANCE: Well nourished, well developed, in no acute distress.  Abdomen - soft, nontender, nondistended, no masses Aquacel bandage and staples removed, Transverse incision healing appropriately. No redness, swelling or drainage.  Mental Status: Alert, oriented x 3, normal affect and mood    Diagnosis:  1. Surgical follow-up care         PLAN:  -Okay to use ibuprofen for burning, temp normal   -Avoid strenuous activity/exercise  -Continue pelvic rest until cleared   -Monitor for S/S of infection and to contact office     All questions answered, pt verbalizes understanding of the above.     F/U: Postpartum visit and PRN

## 2022-09-23 ENCOUNTER — TELEPHONE (OUTPATIENT)
Dept: OBSTETRICS AND GYNECOLOGY | Facility: CLINIC | Age: 32
End: 2022-09-23
Payer: MEDICAID

## 2022-09-23 ENCOUNTER — HOSPITAL ENCOUNTER (EMERGENCY)
Facility: HOSPITAL | Age: 32
Discharge: HOME | End: 2022-09-23
Attending: EMERGENCY MEDICINE
Payer: MEDICAID

## 2022-09-23 VITALS
HEIGHT: 62 IN | BODY MASS INDEX: 42.33 KG/M2 | TEMPERATURE: 98 F | DIASTOLIC BLOOD PRESSURE: 72 MMHG | SYSTOLIC BLOOD PRESSURE: 118 MMHG | OXYGEN SATURATION: 100 % | WEIGHT: 230 LBS | RESPIRATION RATE: 15 BRPM | HEART RATE: 72 BPM

## 2022-09-23 DIAGNOSIS — N30.00 ACUTE CYSTITIS WITHOUT HEMATURIA: Primary | ICD-10-CM

## 2022-09-23 DIAGNOSIS — G89.18 POST-OP PAIN: ICD-10-CM

## 2022-09-23 LAB
ALBUMIN SERPL BCP-MCNC: 3.9 G/DL (ref 3.5–5.2)
ALP SERPL-CCNC: 108 U/L (ref 55–135)
ALT SERPL W/O P-5'-P-CCNC: 26 U/L (ref 10–44)
ANION GAP SERPL CALC-SCNC: 11 MMOL/L (ref 8–16)
AST SERPL-CCNC: 21 U/L (ref 10–40)
BACTERIA #/AREA URNS HPF: ABNORMAL /HPF
BASOPHILS NFR BLD: 1 % (ref 0–1.9)
BILIRUB SERPL-MCNC: 0.4 MG/DL (ref 0.1–1)
BILIRUB UR QL STRIP: NEGATIVE
BUN SERPL-MCNC: 11 MG/DL (ref 6–20)
CALCIUM SERPL-MCNC: 9.1 MG/DL (ref 8.7–10.5)
CHLORIDE SERPL-SCNC: 107 MMOL/L (ref 95–110)
CLARITY UR: ABNORMAL
CO2 SERPL-SCNC: 23 MMOL/L (ref 23–29)
COLOR UR: YELLOW
CREAT SERPL-MCNC: 0.9 MG/DL (ref 0.5–1.4)
DIFFERENTIAL METHOD: ABNORMAL
EOSINOPHIL NFR BLD: 19 % (ref 0–8)
ERYTHROCYTE [DISTWIDTH] IN BLOOD BY AUTOMATED COUNT: 13.2 % (ref 11.5–14.5)
EST. GFR  (NO RACE VARIABLE): >60 ML/MIN/1.73 M^2
GLUCOSE SERPL-MCNC: 83 MG/DL (ref 70–110)
GLUCOSE UR QL STRIP: NEGATIVE
HCT VFR BLD AUTO: 35.6 % (ref 37–48.5)
HGB BLD-MCNC: 11.7 G/DL (ref 12–16)
HGB UR QL STRIP: ABNORMAL
HYALINE CASTS #/AREA URNS LPF: 0 /LPF
IMM GRANULOCYTES # BLD AUTO: ABNORMAL K/UL (ref 0–0.04)
IMM GRANULOCYTES NFR BLD AUTO: ABNORMAL % (ref 0–0.5)
KETONES UR QL STRIP: NEGATIVE
LEUKOCYTE ESTERASE UR QL STRIP: ABNORMAL
LYMPHOCYTES NFR BLD: 32 % (ref 18–48)
MCH RBC QN AUTO: 26.2 PG (ref 27–31)
MCHC RBC AUTO-ENTMCNC: 32.9 G/DL (ref 32–36)
MCV RBC AUTO: 80 FL (ref 82–98)
MICROSCOPIC COMMENT: ABNORMAL
MONOCYTES NFR BLD: 6 % (ref 4–15)
NEUTROPHILS NFR BLD: 40 % (ref 38–73)
NEUTS BAND NFR BLD MANUAL: 2 %
NITRITE UR QL STRIP: NEGATIVE
NON-SQ EPI CELLS #/AREA URNS HPF: 9 /HPF
NRBC BLD-RTO: 0 /100 WBC
PH UR STRIP: 6 [PH] (ref 5–8)
PLATELET # BLD AUTO: 426 K/UL (ref 150–450)
PLATELET BLD QL SMEAR: ABNORMAL
PMV BLD AUTO: 10.2 FL (ref 9.2–12.9)
POTASSIUM SERPL-SCNC: 3.6 MMOL/L (ref 3.5–5.1)
PROT SERPL-MCNC: 7.6 G/DL (ref 6–8.4)
PROT UR QL STRIP: ABNORMAL
RBC # BLD AUTO: 4.46 M/UL (ref 4–5.4)
RBC #/AREA URNS HPF: 21 /HPF (ref 0–4)
SODIUM SERPL-SCNC: 141 MMOL/L (ref 136–145)
SP GR UR STRIP: 1.01 (ref 1–1.03)
SQUAMOUS #/AREA URNS HPF: 56 /HPF
UNIDENT CRYS URNS QL MICRO: 9
URN SPEC COLLECT METH UR: ABNORMAL
UROBILINOGEN UR STRIP-ACNC: NEGATIVE EU/DL
WBC # BLD AUTO: 10.45 K/UL (ref 3.9–12.7)
WBC #/AREA URNS HPF: >100 /HPF (ref 0–5)

## 2022-09-23 PROCEDURE — 25500020 PHARM REV CODE 255: Performed by: NURSE PRACTITIONER

## 2022-09-23 PROCEDURE — 96375 TX/PRO/DX INJ NEW DRUG ADDON: CPT

## 2022-09-23 PROCEDURE — 99285 EMERGENCY DEPT VISIT HI MDM: CPT | Mod: 25

## 2022-09-23 PROCEDURE — 63600175 PHARM REV CODE 636 W HCPCS: Performed by: NURSE PRACTITIONER

## 2022-09-23 PROCEDURE — 87086 URINE CULTURE/COLONY COUNT: CPT | Performed by: EMERGENCY MEDICINE

## 2022-09-23 PROCEDURE — 96365 THER/PROPH/DIAG IV INF INIT: CPT | Mod: 59

## 2022-09-23 PROCEDURE — 80053 COMPREHEN METABOLIC PANEL: CPT | Performed by: NURSE PRACTITIONER

## 2022-09-23 PROCEDURE — 81000 URINALYSIS NONAUTO W/SCOPE: CPT | Performed by: EMERGENCY MEDICINE

## 2022-09-23 PROCEDURE — 85025 COMPLETE CBC W/AUTO DIFF WBC: CPT | Performed by: NURSE PRACTITIONER

## 2022-09-23 RX ORDER — IBUPROFEN 800 MG/1
800 TABLET ORAL EVERY 6 HOURS PRN
Qty: 20 TABLET | Refills: 0 | Status: SHIPPED | OUTPATIENT
Start: 2022-09-23 | End: 2023-10-31

## 2022-09-23 RX ORDER — CEPHALEXIN 500 MG/1
500 CAPSULE ORAL 4 TIMES DAILY
Qty: 28 CAPSULE | Refills: 0 | Status: SHIPPED | OUTPATIENT
Start: 2022-09-23 | End: 2022-09-30

## 2022-09-23 RX ORDER — KETOROLAC TROMETHAMINE 30 MG/ML
15 INJECTION, SOLUTION INTRAMUSCULAR; INTRAVENOUS
Status: COMPLETED | OUTPATIENT
Start: 2022-09-23 | End: 2022-09-23

## 2022-09-23 RX ADMIN — KETOROLAC TROMETHAMINE 15 MG: 30 INJECTION, SOLUTION INTRAMUSCULAR at 04:09

## 2022-09-23 RX ADMIN — IOHEXOL 100 ML: 350 INJECTION, SOLUTION INTRAVENOUS at 05:09

## 2022-09-23 RX ADMIN — CEFTRIAXONE 1 G: 1 INJECTION, SOLUTION INTRAVENOUS at 05:09

## 2022-09-23 NOTE — ED PROVIDER NOTES
"Encounter Date: 2022    SCRIBE #1 NOTE: I, Levi Lopez, am scribing for, and in the presence of,  Peg Bradford NP. I have scribed the following portions of the note - Other sections scribed: HPI, ROS, PE, MDM.     History     Chief Complaint   Patient presents with    Vaginal Pain     C/O having  on 22 and now having a small wound after removal of staples, free of redness or drainage, and having vaginal pain states "pain has been persistent since having a catheter removed and having dysuria"     The patient is a 32 year old female presenting to the ED with complaint of lower abdominal pain and dysuria. Patient reports she was diagnosed with a UTI on  and she was prescribed Macrobid. She also reports a  on  and she recently had the staples removed; patient states there is a small wound at the site of the . Patient was told to come to the ED by her OB-GYN. Patient states her "vagina hurts on the inside when she walks or sits where they put the catheter". Patient denies fever, vomiting, diarrhea, fever, rash, chest pain, neck stiffness or SOB. No other complains at this time. No vag bleeding or dc reported.     History was provided by the patient.   A  (#[id number]) was used.       Vaginal Pain  Associated symptoms include abdominal pain and dysuria. Pertinent negatives include no back pain, chills, diarrhea, fever, headaches, rash, sore throat or vomiting.   Review of patient's allergies indicates:  No Known Allergies  History reviewed. No pertinent past medical history.  Past Surgical History:   Procedure Laterality Date     SECTION       SECTION N/A 2022    Procedure:  SECTION;  Surgeon: Barber Moya MD;  Location: [facility] L&D;  Service: OB/GYN;  Laterality: N/A;    GALLBLADDER SURGERY       Family History   Problem Relation Age of Onset    Hypertension Father     Diabetes Father      Social History     Tobacco Use    " Smoking status: Never    Smokeless tobacco: Never   Substance Use Topics    Alcohol use: Not Currently    Drug use: Never     Review of Systems   Constitutional:  Negative for chills and fever.   HENT:  Negative for ear pain and sore throat.    Eyes:  Negative for redness.   Respiratory:  Negative for shortness of breath.    Cardiovascular:  Negative for chest pain.   Gastrointestinal:  Positive for abdominal pain. Negative for diarrhea and vomiting.   Genitourinary:  Positive for dysuria.   Musculoskeletal:  Negative for back pain and neck stiffness.   Skin:  Negative for rash.   Neurological:  Negative for headaches.     Physical Exam     Initial Vitals [09/23/22 1448]   BP Pulse Resp Temp SpO2   116/68 65 17 99.2 °F (37.3 °C) 99 %      MAP       --         Physical Exam    Nursing note and vitals reviewed.  Constitutional: She appears well-developed and well-nourished.   Eyes: EOM are normal. Pupils are equal, round, and reactive to light.   Neck: Neck supple. No thyromegaly present. No JVD present.   Normal range of motion.  Cardiovascular:  Normal rate, regular rhythm, normal heart sounds and intact distal pulses.           Pulmonary/Chest: Breath sounds normal. No respiratory distress.   Abdominal: Abdomen is soft. Bowel sounds are normal.   Area marked is draining some serous colored fluid. There is no surrounding erythema noted. No gross abscess noted from the superficial standpoint. There is mild abd tenderness to this region.        Musculoskeletal:         General: No tenderness or edema. Normal range of motion.      Cervical back: Normal range of motion and neck supple.     Neurological: She is alert and oriented to person, place, and time. She has normal strength.   Skin: Skin is warm and dry.       ED Course   Procedures  Labs Reviewed   URINALYSIS, REFLEX TO URINE CULTURE - Abnormal; Notable for the following components:       Result Value    Appearance, UA Cloudy (*)     Protein, UA 1+ (*)     Occult  Blood UA 2+ (*)     Leukocytes, UA 3+ (*)     All other components within normal limits    Narrative:     Specimen Source->Urine   URINALYSIS MICROSCOPIC - Abnormal; Notable for the following components:    RBC, UA 21 (*)     WBC, UA >100 (*)     Non-Squam Epith 9 (*)     All other components within normal limits    Narrative:     Specimen Source->Urine   CBC W/ AUTO DIFFERENTIAL - Abnormal; Notable for the following components:    Hemoglobin 11.7 (*)     Hematocrit 35.6 (*)     MCV 80 (*)     MCH 26.2 (*)     Eosinophil % 19.0 (*)     All other components within normal limits   CULTURE, URINE   COMPREHENSIVE METABOLIC PANEL          Imaging Results              CT Abdomen Pelvis With Contrast (Final result)  Result time 22 18:29:23      Final result by Tony Cardoza MD (22 18:29:23)                   Impression:      1. No acute abnormality.  2. Mild hepatomegaly.  3. Small fat containing umbilical hernia.  4. Mild prominence of the uterus with minimal endometrial fluid and trace free fluid in the pelvis.  See above comments.  Findings may be associated with recent .  Endometritis is felt to be less likely.  Recommend clinical correlation and surveillance.      Electronically signed by: Tony Cardoza  Date:    2022  Time:    18:29               Narrative:    EXAMINATION:  CT ABDOMEN PELVIS WITH CONTRAST    CLINICAL HISTORY:  Abdominal abscess/infection suspected; , recent     TECHNIQUE:  Low dose axial images, sagittal and coronal reformations were obtained from the lung bases to the pubic symphysis following the IV administration of 100 mL of Omnipaque 350 .  Oral contrast was not administered.    COMPARISON:  None.    FINDINGS:  Abdomen:    - Lower thorax:    - Lung bases: No infiltrates and no nodules.    - Liver: The liver is mildly enlarged.  No focal abnormality.    - Gallbladder: Status post cholecystectomy.    - Bile Ducts: No evidence of intra or extra hepatic biliary  ductal dilation.    - Spleen: Negative.    - Kidneys: No mass or hydronephrosis.    - Adrenals: Unremarkable.    - Pancreas: No mass or peripancreatic fat stranding.    - Retroperitoneum:  No significant adenopathy.    - Vascular: No abdominal aortic aneurysm.    - Abdominal wall:  Small fat containing umbilical hernia.    Pelvis:    The uterus is midline and minimally prominent.  The myometrium is mildly heterogeneous..  Minimal endometrial fluid is noted.  Findings may be associated with recent .  This could be physiologic.  Recommend clinical correlation.  No adnexal mass.  No significant adenopathy.  Pelvic ultrasound surveillance may be warranted if symptoms persist.    Trace free fluid in the pelvis.    No abscess is detected.    Bowel/Mesentery:    No evidence of bowel obstruction or inflammation.    Bones:  No acute osseous abnormality and no suspicious lytic or blastic lesion.    The appendix is within normal limits.                                       Medications   ketorolac injection 15 mg (15 mg Intravenous Given 22 1600)   cefTRIAXone (ROCEPHIN) 1 g/50 mL D5W IVPB (1 g Intravenous New Bag 22 1750)   iohexoL (OMNIPAQUE 350) injection 100 mL (100 mLs Intravenous Given 22 1737)     Medical Decision Making:   Initial Assessment:   The patient is a 32 year old female presenting to the ED with complaint of lower abdominal pain and dysuria.  Clinical Tests:   Lab Tests: Ordered and Reviewed  Radiological Study: Ordered and Reviewed        Scribe Attestation:   Scribe #1: I performed the above scribed service and the documentation accurately describes the services I performed. I attest to the accuracy of the note.      ED Course as of 09/23/22 1850   Fri Sep 23, 2022   1840 Jermaine Pedroza, UA: 56  Spoke with Dr Jessica concerning CT and ps complaint. Recommends follow up with [facility] on Monday or Tuesday and Keflex. Pt is not toxic in appearance, no wbc noted. Will be treated for UTI.  Afebrile. Strict return precautions given.  [DT]      ED Course User Index  [DT] Peg Bradford NP                 Clinical Impression:   Final diagnoses:  [N30.00] Acute cystitis without hematuria (Primary)  [G89.18] Post-op pain  [O90.9]  section wound complications      ED Disposition Condition    Discharge Stable        I, Peg Bradford NP, personally performed the services described in this documentation.All medical record entries made by the scribe were at my direction and in my presence.I have reviewed the chart and agree that the record reflects my personal performance and is accurate and complete.     ED Prescriptions       Medication Sig Dispense Start Date End Date Auth. Provider    cephALEXin (KEFLEX) 500 MG capsule Take 1 capsule (500 mg total) by mouth 4 (four) times daily. for 7 days 28 capsule 2022 Peg Bradford NP    ibuprofen (ADVIL,MOTRIN) 800 MG tablet Take 1 tablet (800 mg total) by mouth every 6 (six) hours as needed for Pain. 20 tablet 2022 -- Peg Bradford NP          Follow-up Information       Follow up With Specialties Details Why Contact Info    Barber Moya MD Obstetrics and Gynecology Schedule an appointment as soon as possible for a visit in 3 days  [address] [facility]  [address] [place] [address] [place] [address] [phone]               Peg Bradford NP  22 [phone]

## 2022-09-23 NOTE — DISCHARGE INSTRUCTIONS

## 2022-09-23 NOTE — TELEPHONE ENCOUNTER
----- Message from Maite Wright sent at 9/23/2022 11:27 AM CDT -----  Type:  Sooner Appoointment Request    Caller is requesting a sooner appointment.  Caller declined first available appointment listed below.  Caller will not accept being placed on the waitlist and is requesting a message be sent to doctor.  Name of Caller:patient  When is the first available appointment?10/26  Symptoms:her incision bleeding and painfule  Would the patient rather a call back or a response via [facility]? call  Best Call Back Number:[phone]  Additional Information: none

## 2022-09-23 NOTE — TELEPHONE ENCOUNTER
Returned pt call and she informs us that at her  site is painful and in her vagina area. Per Dr. Wiedemann is to come to [facility] to be evaluated if she is in unbearable pain and discomfort. Pt verbalized understanding.

## 2022-09-25 LAB — BACTERIA UR CULT: NORMAL

## 2023-07-27 ENCOUNTER — HOSPITAL ENCOUNTER (EMERGENCY)
Facility: HOSPITAL | Age: 33
Discharge: HOME | End: 2023-07-28
Attending: EMERGENCY MEDICINE
Payer: MEDICAID

## 2023-07-27 DIAGNOSIS — R55 SYNCOPE: Primary | ICD-10-CM

## 2023-07-27 DIAGNOSIS — Z3A.10 10 WEEKS GESTATION OF PREGNANCY: ICD-10-CM

## 2023-07-27 LAB
B-HCG UR QL: POSITIVE
CTP QC/QA: YES

## 2023-07-27 PROCEDURE — 96360 HYDRATION IV INFUSION INIT: CPT

## 2023-07-27 PROCEDURE — 93005 ELECTROCARDIOGRAM TRACING: CPT

## 2023-07-27 PROCEDURE — 25000003 PHARM REV CODE 250: Performed by: EMERGENCY MEDICINE

## 2023-07-27 PROCEDURE — 80053 COMPREHEN METABOLIC PANEL: CPT | Performed by: EMERGENCY MEDICINE

## 2023-07-27 PROCEDURE — 84702 CHORIONIC GONADOTROPIN TEST: CPT | Performed by: EMERGENCY MEDICINE

## 2023-07-27 PROCEDURE — 93010 EKG 12-LEAD: ICD-10-PCS | Mod: ,,, | Performed by: INTERNAL MEDICINE

## 2023-07-27 PROCEDURE — 99285 EMERGENCY DEPT VISIT HI MDM: CPT

## 2023-07-27 PROCEDURE — 81000 URINALYSIS NONAUTO W/SCOPE: CPT | Performed by: EMERGENCY MEDICINE

## 2023-07-27 PROCEDURE — 81025 URINE PREGNANCY TEST: CPT | Performed by: EMERGENCY MEDICINE

## 2023-07-27 PROCEDURE — 93010 ELECTROCARDIOGRAM REPORT: CPT | Mod: ,,, | Performed by: INTERNAL MEDICINE

## 2023-07-27 RX ADMIN — SODIUM CHLORIDE 1000 ML: 9 INJECTION, SOLUTION INTRAVENOUS at 11:07

## 2023-07-28 VITALS
TEMPERATURE: 99 F | RESPIRATION RATE: 17 BRPM | OXYGEN SATURATION: 100 % | DIASTOLIC BLOOD PRESSURE: 66 MMHG | SYSTOLIC BLOOD PRESSURE: 133 MMHG | HEART RATE: 83 BPM

## 2023-07-28 LAB
ALBUMIN SERPL BCP-MCNC: 3.9 G/DL (ref 3.5–5.2)
ALP SERPL-CCNC: 76 U/L (ref 55–135)
ALT SERPL W/O P-5'-P-CCNC: 20 U/L (ref 10–44)
ANION GAP SERPL CALC-SCNC: 12 MMOL/L (ref 8–16)
AST SERPL-CCNC: 18 U/L (ref 10–40)
BASOPHILS # BLD AUTO: ABNORMAL K/UL (ref 0–0.2)
BASOPHILS NFR BLD: 2 % (ref 0–1.9)
BILIRUB SERPL-MCNC: 0.2 MG/DL (ref 0.1–1)
BILIRUB UR QL STRIP: NEGATIVE
BUN SERPL-MCNC: 6 MG/DL (ref 6–20)
CALCIUM SERPL-MCNC: 9.3 MG/DL (ref 8.7–10.5)
CHLORIDE SERPL-SCNC: 106 MMOL/L (ref 95–110)
CLARITY UR: CLEAR
CO2 SERPL-SCNC: 18 MMOL/L (ref 23–29)
COLOR UR: COLORLESS
CREAT SERPL-MCNC: 0.7 MG/DL (ref 0.5–1.4)
DIFFERENTIAL METHOD: ABNORMAL
EOSINOPHIL # BLD AUTO: ABNORMAL K/UL (ref 0–0.5)
EOSINOPHIL NFR BLD: 0 % (ref 0–8)
ERYTHROCYTE [DISTWIDTH] IN BLOOD BY AUTOMATED COUNT: 13.6 % (ref 11.5–14.5)
EST. GFR  (NO RACE VARIABLE): >60 ML/MIN/1.73 M^2
GLUCOSE SERPL-MCNC: 137 MG/DL (ref 70–110)
GLUCOSE UR QL STRIP: NEGATIVE
HCG INTACT+B SERPL-ACNC: NORMAL MIU/ML
HCT VFR BLD AUTO: 33.2 % (ref 37–48.5)
HGB BLD-MCNC: 11.6 G/DL (ref 12–16)
HGB UR QL STRIP: NEGATIVE
IMM GRANULOCYTES # BLD AUTO: ABNORMAL K/UL (ref 0–0.04)
IMM GRANULOCYTES NFR BLD AUTO: ABNORMAL % (ref 0–0.5)
KETONES UR QL STRIP: NEGATIVE
LEUKOCYTE ESTERASE UR QL STRIP: ABNORMAL
LYMPHOCYTES # BLD AUTO: ABNORMAL K/UL (ref 1–4.8)
LYMPHOCYTES NFR BLD: 26 % (ref 18–48)
MCH RBC QN AUTO: 27 PG (ref 27–31)
MCHC RBC AUTO-ENTMCNC: 34.9 G/DL (ref 32–36)
MCV RBC AUTO: 77 FL (ref 82–98)
MICROSCOPIC COMMENT: NORMAL
MONOCYTES # BLD AUTO: ABNORMAL K/UL (ref 0.3–1)
MONOCYTES NFR BLD: 8 % (ref 4–15)
NEUTROPHILS NFR BLD: 64 % (ref 38–73)
NITRITE UR QL STRIP: NEGATIVE
NRBC BLD-RTO: 0 /100 WBC
PH UR STRIP: 7 [PH] (ref 5–8)
PLATELET # BLD AUTO: 186 K/UL (ref 150–450)
PMV BLD AUTO: 11.1 FL (ref 9.2–12.9)
POTASSIUM SERPL-SCNC: 3.8 MMOL/L (ref 3.5–5.1)
PROT SERPL-MCNC: 7.2 G/DL (ref 6–8.4)
PROT UR QL STRIP: NEGATIVE
RBC # BLD AUTO: 4.3 M/UL (ref 4–5.4)
SODIUM SERPL-SCNC: 136 MMOL/L (ref 136–145)
SP GR UR STRIP: 1.01 (ref 1–1.03)
URN SPEC COLLECT METH UR: ABNORMAL
UROBILINOGEN UR STRIP-ACNC: NEGATIVE EU/DL
WBC # BLD AUTO: 12.01 K/UL (ref 3.9–12.7)
WBC #/AREA URNS HPF: 2 /HPF (ref 0–5)

## 2023-07-28 PROCEDURE — 85027 COMPLETE CBC AUTOMATED: CPT | Performed by: EMERGENCY MEDICINE

## 2023-07-28 PROCEDURE — 85007 BL SMEAR W/DIFF WBC COUNT: CPT | Performed by: EMERGENCY MEDICINE

## 2023-07-28 RX ORDER — FAMOTIDINE 20 MG
1 TABLET ORAL DAILY
Qty: 30 TABLET | Refills: 0 | Status: ON HOLD | OUTPATIENT
Start: 2023-07-28 | End: 2024-02-17

## 2023-07-28 NOTE — ED NOTES
#[id number].  States she was cooking and felt dizzy.  States nose started bleeding and she fainted.  C/O headache and dizziness x 1 week.  States she is 9 weeks pregnant.  Denies any vaginal bleeding.

## 2023-07-28 NOTE — ED PROVIDER NOTES
Encounter Date: 2023       History     Chief Complaint   Patient presents with    Loss of Consciousness     Pt reports to ED with c/o syncopal episode at home after pt had nosebleed. pt 8 weeks pregnant.      33-year-old female 9 and half weeks pregnant presents for syncope and epistaxis.  Patient states she was standing in her kitchen cooking and felt lightheaded she had a nosebleed from her left naris and had a syncopal event.  Patient reports intermittent abdominal pain with the pregnancy but no vaginal bleeding.  No chest pain or shortness of breath.  No nausea or vomiting.    The history is provided by the patient. The history is limited by a language barrier. A  was used.   Review of patient's allergies indicates:  No Known Allergies  No past medical history on file.  Past Surgical History:   Procedure Laterality Date     SECTION       SECTION N/A 2022    Procedure:  SECTION;  Surgeon: Barber Moya MD;  Location: [facility] L&D;  Service: OB/GYN;  Laterality: N/A;    GALLBLADDER SURGERY       Family History   Problem Relation Age of Onset    Hypertension Father     Diabetes Father      Social History     Tobacco Use    Smoking status: Never    Smokeless tobacco: Never   Substance Use Topics    Alcohol use: Not Currently    Drug use: Never     Review of Systems   HENT:  Positive for nosebleeds.    Gastrointestinal:  Positive for abdominal pain.   Neurological:  Positive for syncope.     Physical Exam     Initial Vitals [23 2140]   BP Pulse Resp Temp SpO2   (!) 144/82 84 20 98.3 °F (36.8 °C) 99 %      MAP       --         Physical Exam    Nursing note and vitals reviewed.  Constitutional: She appears well-developed and well-nourished. No distress.   HENT:   Head: Normocephalic and atraumatic.   Mouth/Throat: Oropharynx is clear and moist.   Dried blood around the left naris no active hemorrhage   Eyes: Conjunctivae and EOM are normal. Pupils are equal,  round, and reactive to light.   Neck: Neck supple.   Normal range of motion.  Cardiovascular:  Normal rate, regular rhythm and intact distal pulses.           Pulmonary/Chest: Breath sounds normal. No stridor. No respiratory distress.   Abdominal: Abdomen is soft. She exhibits no distension. There is no abdominal tenderness.   Musculoskeletal:         General: Normal range of motion.      Cervical back: Normal range of motion and neck supple.      Comments: Moving all extremities with grossly equal strength     Neurological: She is alert and oriented to person, place, and time.   CN 2-12 appear grossly intact   Skin: Skin is warm and dry.   Psychiatric: She has a normal mood and affect.       ED Course   Procedures  Labs Reviewed   COMPREHENSIVE METABOLIC PANEL - Abnormal; Notable for the following components:       Result Value    CO2 18 (*)     Glucose 137 (*)     All other components within normal limits    Narrative:     Release to patient->Immediate   URINALYSIS, REFLEX TO URINE CULTURE - Abnormal; Notable for the following components:    Color, UA Colorless (*)     Leukocytes, UA Trace (*)     All other components within normal limits    Narrative:     Specimen Source->Urine   CBC W/ AUTO DIFFERENTIAL - Abnormal; Notable for the following components:    Hemoglobin 11.6 (*)     Hematocrit 33.2 (*)     MCV 77 (*)     All other components within normal limits   POCT URINE PREGNANCY - Abnormal; Notable for the following components:    POC Preg Test, Ur Positive (*)     All other components within normal limits   HCG, QUANTITATIVE    Narrative:     Release to patient->Immediate   URINALYSIS MICROSCOPIC    Narrative:     Specimen Source->Urine     EKG Readings: (Independently Interpreted)   Initial Reading: No STEMI. Rhythm: Normal Sinus Rhythm. Heart Rate: 67. Ectopy: No Ectopy. Conduction: Normal. ST Segments: Normal ST Segments. T Waves Flipped: III. Axis: Normal. Clinical Impression: Normal Sinus Rhythm      Imaging Results              US OB <14 Wks TransAbd & TransVag, Single Gestation (XPD) (Final result)  Result time 07/28/23 01:35:40      Final result by Lashanda Rios MD (07/28/23 01:35:40)                   Impression:      1. Single live intrauterine pregnancy with estimated gestational age 10 weeks 2 days by crown-rump length and an AICHA of 02/21/2024 by ultrasound.  Appropriate obstetrical care/follow-up ultrasound advised.  2. Right corpus luteum cyst.      Electronically signed by: Lashanda Rios MD  Date:    07/28/2023  Time:    01:35               Narrative:    EXAMINATION:  US OB <14 WEEKS, TRANSABDOM & TRANSVAG, SINGLE GESTATION (XPD)    CLINICAL HISTORY:  abdominal pain, syncope;    TECHNIQUE:  Transabdominal sonography of the pelvis was performed, followed by transvaginal sonography to better evaluate the uterus and ovaries.    COMPARISON:  None.    FINDINGS:  The uterus measures 13.5 x 7.5 x 10.4 cm. Uterine parenchyma is homogeneous. In the uterine cavity there is a gestational sac with a mean diameter of 4.2 cm. The fetal pole measures 3.4 cm by crown rump length and corresponds to a 10 weeks 2 days gestation. Fetal heart rate is 170 BPM. The yolk sac measures 0.4 cm.  Trace fluid in the cervical canal.  No subchorionic hemorrhage.    The right ovary measures 4.2 x 1.9 x 3.4 cm. The left ovary measures 3.0 x 1.9 x 2.0 cm. Arterial and venous flow are preserved.  A corpus luteum cyst measuring 2.1 x 1.6 x 1.8 cm is seen in the right ovary. No free fluid is seen.                                    X-Rays:   Independently Interpreted Readings:   Other Readings:  I reviewed the patient's ultrasound independently and agree with radiology findings single IUP 10 weeks 2 days by CRL  Medications   sodium chloride 0.9% bolus 1,000 mL 1,000 mL (0 mLs Intravenous Stopped 7/28/23 0044)     Medical Decision Making:   Initial Assessment:   Well-appearing nontoxic normal vitals  Differential Diagnosis:   Syncope  likely vasovagal rule out ectopic pregnancy.  Epistaxis has since resolved           ED Course as of 07/28/23 0230   Fri Jul 28, 2023 0227 CBC reassuring mild anemia only no leukocytosis.  Patient's UPT is positive her urinalysis is unremarkable and reassuring no evidence of UTI.  Her chemistry profile significant only for very mild hyperglycemia with normal anion gap otherwise no significant electrolyte derangements.  Her beta quant is appropriate for gestational age. [AP]      ED Course User Index  [AP] Helder Rodirguez DO                 Clinical Impression:   Final diagnoses:  [R55] Syncope (Primary)  [Z3A.10] 10 weeks gestation of pregnancy        ED Disposition Condition    Discharge Stable          ED Prescriptions       Medication Sig Dispense Start Date End Date Auth. Provider    prenatal 21-iron fu-folic acid (PRENATAL COMPLETE) 14 mg iron- 400 mcg Tab Take 1 tablet by mouth once daily. 30 tablet 7/28/2023 7/27/2024 Helder Rodriguez DO          Follow-up Information       Follow up With Specialties Details Why Contact Info Additional Information    [facility] - OB GYN Obstetrics and Gynecology Schedule an appointment as soon as possible for a visit   [address]  [facility] [address] [phone] Please park in [address] C or D and use [address] rmaos. Take Medical Office Bldg. elevators.    [facility] - Emergency Dept Emergency Medicine  If symptoms worsen [address]  [facility] [id number] [phone]              Helder Rodriguez DO  07/28/23 0230

## 2023-07-28 NOTE — ED NOTES
Patient identifiers for Erika Vasquez checked and correct.  LOC: The patient is awake, alert and aware of environment with an appropriate affect, the patient is oriented x 3 and speaking appropriately.  APPEARANCE: Patient resting quietly and in no acute distress, patient is clean and well groomed, patient's clothing are properly fastened.  SKIN: The skin is warm and dry, patient has normal skin turgor and moist mucus membranes, skin intact, no breakdown or brusing noted.  MUSKULOSKELETAL: Patient moving all extremities well, no obvious swelling or deformities noted.  RESPIRATORY: Airway is open and patent, respirations are spontaneous, patient has a normal effort and rate.  ABDOMEN: Soft and non tender to palpation.

## 2023-08-14 PROBLEM — Z3A.39 39 WEEKS GESTATION OF PREGNANCY: Status: RESOLVED | Noted: 2022-09-02 | Resolved: 2023-08-14

## 2023-09-05 ENCOUNTER — TELEPHONE (OUTPATIENT)
Dept: OBSTETRICS AND GYNECOLOGY | Facility: CLINIC | Age: 33
End: 2023-09-05
Payer: MEDICAID

## 2023-09-05 NOTE — TELEPHONE ENCOUNTER
----- Message from Desean Puente sent at 9/5/2023 10:49 AM CDT -----  Type:  pregnancy confirmation    Who Called: pt  Would the patient rather a call back or a response via [facility]? call  Best Call Back Number: [phone]  Additional Information:

## 2023-10-31 ENCOUNTER — OFFICE VISIT (OUTPATIENT)
Dept: OBSTETRICS AND GYNECOLOGY | Facility: CLINIC | Age: 33
End: 2023-10-31
Payer: MEDICAID

## 2023-10-31 ENCOUNTER — LAB VISIT (OUTPATIENT)
Dept: LAB | Facility: HOSPITAL | Age: 33
End: 2023-10-31
Attending: OBSTETRICS & GYNECOLOGY
Payer: MEDICAID

## 2023-10-31 VITALS
DIASTOLIC BLOOD PRESSURE: 76 MMHG | SYSTOLIC BLOOD PRESSURE: 128 MMHG | BODY MASS INDEX: 47.12 KG/M2 | WEIGHT: 257.63 LBS

## 2023-10-31 DIAGNOSIS — Z3A.23 23 WEEKS GESTATION OF PREGNANCY: ICD-10-CM

## 2023-10-31 DIAGNOSIS — O09.32 INITIAL OBSTETRIC VISIT IN SECOND TRIMESTER: ICD-10-CM

## 2023-10-31 DIAGNOSIS — Z11.3 SCREENING FOR STD (SEXUALLY TRANSMITTED DISEASE): ICD-10-CM

## 2023-10-31 DIAGNOSIS — Z32.01 POSITIVE PREGNANCY TEST: ICD-10-CM

## 2023-10-31 DIAGNOSIS — O09.32 INITIAL OBSTETRIC VISIT IN SECOND TRIMESTER: Primary | ICD-10-CM

## 2023-10-31 LAB
ABO + RH BLD: NORMAL
B-HCG UR QL: POSITIVE
BASOPHILS # BLD AUTO: 0.02 K/UL (ref 0–0.2)
BASOPHILS NFR BLD: 0.2 % (ref 0–1.9)
BLD GP AB SCN CELLS X3 SERPL QL: NORMAL
CTP QC/QA: YES
DIFFERENTIAL METHOD: ABNORMAL
EOSINOPHIL # BLD AUTO: 0.3 K/UL (ref 0–0.5)
EOSINOPHIL NFR BLD: 2.6 % (ref 0–8)
ERYTHROCYTE [DISTWIDTH] IN BLOOD BY AUTOMATED COUNT: 13.2 % (ref 11.5–14.5)
HCT VFR BLD AUTO: 33.4 % (ref 37–48.5)
HGB BLD-MCNC: 11.6 G/DL (ref 12–16)
IMM GRANULOCYTES # BLD AUTO: 0.08 K/UL (ref 0–0.04)
IMM GRANULOCYTES NFR BLD AUTO: 0.7 % (ref 0–0.5)
LYMPHOCYTES # BLD AUTO: 2.3 K/UL (ref 1–4.8)
LYMPHOCYTES NFR BLD: 18.9 % (ref 18–48)
MCH RBC QN AUTO: 27 PG (ref 27–31)
MCHC RBC AUTO-ENTMCNC: 34.7 G/DL (ref 32–36)
MCV RBC AUTO: 78 FL (ref 82–98)
MONOCYTES # BLD AUTO: 0.9 K/UL (ref 0.3–1)
MONOCYTES NFR BLD: 7.5 % (ref 4–15)
NEUTROPHILS # BLD AUTO: 8.7 K/UL (ref 1.8–7.7)
NEUTROPHILS NFR BLD: 70.8 % (ref 38–73)
NRBC BLD-RTO: 0 /100 WBC
PLATELET # BLD AUTO: 327 K/UL (ref 150–450)
PMV BLD AUTO: 10.7 FL (ref 9.2–12.9)
RBC # BLD AUTO: 4.29 M/UL (ref 4–5.4)
WBC # BLD AUTO: 12.24 K/UL (ref 3.9–12.7)

## 2023-10-31 PROCEDURE — 81514 NFCT DS BV&VAGINITIS DNA ALG: CPT

## 2023-10-31 PROCEDURE — 99999PBSHW POCT URINE PREGNANCY: ICD-10-PCS | Mod: PBBFAC,,,

## 2023-10-31 PROCEDURE — 1159F PR MEDICATION LIST DOCUMENTED IN MEDICAL RECORD: ICD-10-PCS | Mod: CPTII,,, | Performed by: OBSTETRICS & GYNECOLOGY

## 2023-10-31 PROCEDURE — 87491 CHLMYD TRACH DNA AMP PROBE: CPT

## 2023-10-31 PROCEDURE — 86901 BLOOD TYPING SEROLOGIC RH(D): CPT | Performed by: OBSTETRICS & GYNECOLOGY

## 2023-10-31 PROCEDURE — 3078F DIAST BP <80 MM HG: CPT | Mod: CPTII,,, | Performed by: OBSTETRICS & GYNECOLOGY

## 2023-10-31 PROCEDURE — 87086 URINE CULTURE/COLONY COUNT: CPT

## 2023-10-31 PROCEDURE — 99205 OFFICE O/P NEW HI 60 MIN: CPT | Mod: S$PBB,TH,, | Performed by: OBSTETRICS & GYNECOLOGY

## 2023-10-31 PROCEDURE — 36415 COLL VENOUS BLD VENIPUNCTURE: CPT | Performed by: OBSTETRICS & GYNECOLOGY

## 2023-10-31 PROCEDURE — 99999 PR PBB SHADOW E&M-EST. PATIENT-LVL II: ICD-10-PCS | Mod: PBBFAC,,, | Performed by: OBSTETRICS & GYNECOLOGY

## 2023-10-31 PROCEDURE — 99205 PR OFFICE/OUTPT VISIT, NEW, LEVL V, 60-74 MIN: ICD-10-PCS | Mod: S$PBB,TH,, | Performed by: OBSTETRICS & GYNECOLOGY

## 2023-10-31 PROCEDURE — 87340 HEPATITIS B SURFACE AG IA: CPT

## 2023-10-31 PROCEDURE — 86762 RUBELLA ANTIBODY: CPT

## 2023-10-31 PROCEDURE — 3078F PR MOST RECENT DIASTOLIC BLOOD PRESSURE < 80 MM HG: ICD-10-PCS | Mod: CPTII,,, | Performed by: OBSTETRICS & GYNECOLOGY

## 2023-10-31 PROCEDURE — 86803 HEPATITIS C AB TEST: CPT

## 2023-10-31 PROCEDURE — 88141 CYTOPATH C/V INTERPRET: CPT | Mod: ,,, | Performed by: STUDENT IN AN ORGANIZED HEALTH CARE EDUCATION/TRAINING PROGRAM

## 2023-10-31 PROCEDURE — 3008F PR BODY MASS INDEX (BMI) DOCUMENTED: ICD-10-PCS | Mod: CPTII,,, | Performed by: OBSTETRICS & GYNECOLOGY

## 2023-10-31 PROCEDURE — 3074F PR MOST RECENT SYSTOLIC BLOOD PRESSURE < 130 MM HG: ICD-10-PCS | Mod: CPTII,,, | Performed by: OBSTETRICS & GYNECOLOGY

## 2023-10-31 PROCEDURE — 87389 HIV-1 AG W/HIV-1&-2 AB AG IA: CPT

## 2023-10-31 PROCEDURE — 3008F BODY MASS INDEX DOCD: CPT | Mod: CPTII,,, | Performed by: OBSTETRICS & GYNECOLOGY

## 2023-10-31 PROCEDURE — 85025 COMPLETE CBC W/AUTO DIFF WBC: CPT

## 2023-10-31 PROCEDURE — 88141 PR  CYTOPATH CERV/VAG INTERPRET: ICD-10-PCS | Mod: ,,, | Performed by: STUDENT IN AN ORGANIZED HEALTH CARE EDUCATION/TRAINING PROGRAM

## 2023-10-31 PROCEDURE — 86592 SYPHILIS TEST NON-TREP QUAL: CPT

## 2023-10-31 PROCEDURE — 81025 URINE PREGNANCY TEST: CPT | Mod: PBBFAC,PO | Performed by: OBSTETRICS & GYNECOLOGY

## 2023-10-31 PROCEDURE — 3074F SYST BP LT 130 MM HG: CPT | Mod: CPTII,,, | Performed by: OBSTETRICS & GYNECOLOGY

## 2023-10-31 PROCEDURE — 1159F MED LIST DOCD IN RCRD: CPT | Mod: CPTII,,, | Performed by: OBSTETRICS & GYNECOLOGY

## 2023-10-31 PROCEDURE — 99212 OFFICE O/P EST SF 10 MIN: CPT | Mod: PBBFAC,TH,PO | Performed by: OBSTETRICS & GYNECOLOGY

## 2023-10-31 PROCEDURE — 99999PBSHW POCT URINE PREGNANCY: Mod: PBBFAC,,,

## 2023-10-31 PROCEDURE — 99999 PR PBB SHADOW E&M-EST. PATIENT-LVL II: CPT | Mod: PBBFAC,,, | Performed by: OBSTETRICS & GYNECOLOGY

## 2023-10-31 PROCEDURE — 88175 CYTOPATH C/V AUTO FLUID REDO: CPT | Performed by: STUDENT IN AN ORGANIZED HEALTH CARE EDUCATION/TRAINING PROGRAM

## 2023-10-31 NOTE — PROGRESS NOTES
CC: +upt    SUBJECTIVE:   33 y.o. female   for annual routine Pap and checkup. Patient's last menstrual period was 2023 (exact date).  EGA is 23 wks 6d today and EDC is 2024. She had 2 prior C/S without complications. She would like BTL. She has no unusual complaints. She did not have prenatal care and has not taken prenatal vitamins. Was not able to come to OBGYN sooner due to insurance issues.        #[id number] utilized for this encounter.      History reviewed. No pertinent past medical history.  Past Surgical History:   Procedure Laterality Date     SECTION       SECTION N/A 2022    Procedure:  SECTION;  Surgeon: Barber Moya MD;  Location: [facility] L&D;  Service: OB/GYN;  Laterality: N/A;    GALLBLADDER SURGERY       Social History     Socioeconomic History    Marital status: Single   Tobacco Use    Smoking status: Never    Smokeless tobacco: Never   Substance and Sexual Activity    Alcohol use: Not Currently    Drug use: Never    Sexual activity: Yes     Partners: Male     Family History   Problem Relation Age of Onset    Hypertension Father     Diabetes Father      OB History    Para Term  AB Living   3 2 2     2   SAB IAB Ectopic Multiple Live Births         0 2      # Outcome Date GA Lbr Ken/2nd Weight Sex Delivery Anes PTL Lv   3 Current            2 Term 22 39w0d  3.81 kg (8 lb 6.4 oz) F CS-LTranv Spinal  BOBBY   1 Term  38w3d    CS-Unspec   BOBBY      Complications: Pre-eclampsia         Current Outpatient Medications   Medication Sig Dispense Refill    prenatal 21-iron fu-folic acid (PRENATAL COMPLETE) 14 mg iron- 400 mcg Tab Take 1 tablet by mouth once daily. (Patient not taking: Reported on 10/31/2023) 30 tablet 0    prenatal vit,giorgi 74-iron-folic 27 mg iron- 1 mg Tab Take 1 tablet by mouth once daily. (Patient not taking: Reported on 2022) 90 tablet 3     No current facility-administered medications for this visit.      Allergies: Patient has no known allergies.     ROS:  Constitutional: no weight loss, weight gain, fever, fatigue  Eyes:  No vision changes, glasses/contacts  ENT/Mouth: No ulcers, sinus problems, ears ringing, headache  Cardiovascular: No inability to lie flat, chest pain, exercise intolerance, swelling, heart palpitations  Respiratory: No wheezing, coughing blood, shortness of breath, or cough  Gastrointestinal: No diarrhea, bloody stool, nausea/vomiting, constipation, gas, hemorrhoids  Genitourinary: No blood in urine, painful urination, urgency of urination, frequency of urination, incomplete emptying, incontinence, abnormal bleeding, painful periods, heavy periods, vaginal discharge, vaginal odor, painful intercourse, sexual problems, bleeding after intercourse.  Musculoskeletal: No muscle weakness  Skin/Breast: No painful breasts, nipple discharge, masses, rash, ulcers  Neurological: No passing out, seizures, numbness, headache  Endocrine: No diabetes, hypothyroid, hyperthyroid, hot flashes, hair loss, abnormal hair growth, ance  Psychiatric: No depression, crying  Hematologic: No bruises, bleeding, swollen lymph nodes, anemia.      OBJECTIVE:   The patient appears well, alert, oriented x 3, in no distress.  /76   Wt 116.8 kg (257 lb 9.7 oz)   LMP 05/20/2023 (Exact Date)   BMI 47.12 kg/m²   NECK: no thyromegaly, trachea midline  SKIN: normal and no acne, striae, hirsutism  BREAST EXAM: deferred  ABDOMEN: no hernias, masses, or hepatosplenomegaly  GENITALIA: normal external genitalia, no erythema, no discharge  URETHRA: normal urethra, normal urethral meatus  VAGINA: Normal  CERVIX: limited due to body habitus, no cervical motion tenderness  UTERUS: not examined  ADNEXA: not examined      ASSESSMENT:     1. Initial obstetric visit in second trimester    2. 23 weeks gestation of pregnancy    3. Screening for STD (sexually transmitted disease)    4. Positive pregnancy test        PLAN:   FHT 150s  today  pap smear  additional lab tests per orders  RTC in 1 week after U/S results      Orders Placed This Encounter    CULTURE, URINE    C. trachomatis/N. gonorrhoeae by AMP DNA [facility]; Cervix    VAGINOSIS SCREEN BY DNA PROBE    CBC auto differential    RPR    HIV 1/2 Ag/Ab (4th Gen)    HEPATITIS B SURFACE ANTIGEN    Rubella antibody, IgG    HEPATITIS C ANTIBODY    OB GLUCOSE SCREEN    POCT Urine Pregnancy    Type & Screen - Ob Profile    Liquid-Based Pap Smear, Screening    US OB/GYN Procedure ([facility])-Future       Discussed: Blood test ordered on first visit, Vitamins, folic acid, Wt Gain, Seafood and mercury, avoid deli food, unrefrigerated meats, cheeses and milk products, reason to avoid cat litter, the  accuracy of the AICHA, the value of an early TVS, risks of each trimester,  Aneuploidy screening, OTC medication in the first trimester, harmful effects of Smoking and ETOH, AFP screen at 15 weeks and Anatomy +/- MFM US at 18-20 weeks.  Discussed: Common complaints of pregnancy, HIV and other routine prenatal tests, Tobacco abuse, risk factors identified by prenatal history, Anticipated course of prenatal care, Nutrition and weight gain counseling,Toxoplasmosis precautions (Cats/Raw Meat) Exercise, Alcohol, Illicit/Recreational Drugs, Seat belt use, Childbirth classes/Hospital facilities.The counseling session lasted approximately 25 minutes, and all her questions were answered.         Berenice Meyers MD, MPH  [facility] Family Medicine PGY-2  10/31/2023

## 2023-11-01 ENCOUNTER — LAB VISIT (OUTPATIENT)
Dept: LAB | Facility: HOSPITAL | Age: 33
End: 2023-11-01
Payer: MEDICAID

## 2023-11-01 ENCOUNTER — PROCEDURE VISIT (OUTPATIENT)
Dept: MATERNAL FETAL MEDICINE | Facility: CLINIC | Age: 33
End: 2023-11-01
Payer: MEDICAID

## 2023-11-01 DIAGNOSIS — Z3A.23 23 WEEKS GESTATION OF PREGNANCY: ICD-10-CM

## 2023-11-01 DIAGNOSIS — O09.32 INITIAL OBSTETRIC VISIT IN SECOND TRIMESTER: ICD-10-CM

## 2023-11-01 DIAGNOSIS — Z32.01 POSITIVE PREGNANCY TEST: ICD-10-CM

## 2023-11-01 LAB
GLUCOSE SERPL-MCNC: 210 MG/DL (ref 70–140)
HBV SURFACE AG SERPL QL IA: NORMAL
HCV AB SERPL QL IA: NORMAL
HIV 1+2 AB+HIV1 P24 AG SERPL QL IA: NORMAL
RPR SER QL: NORMAL
RUBV IGG SER-ACNC: 40.1 IU/ML
RUBV IGG SER-IMP: REACTIVE

## 2023-11-01 PROCEDURE — 36415 COLL VENOUS BLD VENIPUNCTURE: CPT

## 2023-11-01 PROCEDURE — 82950 GLUCOSE TEST: CPT

## 2023-11-01 PROCEDURE — 76811 OB US DETAILED SNGL FETUS: CPT | Mod: PBBFAC,PO | Performed by: STUDENT IN AN ORGANIZED HEALTH CARE EDUCATION/TRAINING PROGRAM

## 2023-11-01 PROCEDURE — 76811 US OB/GYN PROCEDURE (VIEWPOINT): ICD-10-PCS | Mod: 26,S$PBB,, | Performed by: STUDENT IN AN ORGANIZED HEALTH CARE EDUCATION/TRAINING PROGRAM

## 2023-11-02 LAB
BACTERIA UR CULT: NORMAL
BACTERIA UR CULT: NORMAL
BACTERIAL VAGINOSIS DNA: NEGATIVE
C TRACH DNA SPEC QL NAA+PROBE: NOT DETECTED
CANDIDA GLABRATA DNA: NEGATIVE
CANDIDA KRUSEI DNA: NEGATIVE
CANDIDA RRNA VAG QL PROBE: NEGATIVE
N GONORRHOEA DNA SPEC QL NAA+PROBE: NOT DETECTED
T VAGINALIS RRNA GENITAL QL PROBE: NEGATIVE

## 2023-11-06 NOTE — TELEPHONE ENCOUNTER
----- Message from Kaylynn Hilario sent at 11/6/2023  8:20 AM CST -----  Type:  RX Refill Request    Who Called: pt  Refill or New Rx:refill  RX Name and Strength:blood sugar diagnostic Strp  How is the patient currently taking it? (ex. 1XDay): To check BG 5 times daily  Is this a 30 day or 90 day RX:200  Preferred Pharmacy with phone number:[facility] [address]   Phone:  [phone]  Fax:  [phone]        Local or Mail Order:local  Ordering Provider:Dr Vera  Would the patient rather a call back or a response via [facility]? call  Best Call Back Number:[phone]   Additional Information:        Type:  RX Refill Request    Who Called: pt  Refill or New Rx:refill  RX Name and Strength:blood-glucose meter kit  How is the patient currently taking it? (ex. 1XDay): To ch5eck BG 5 times daily  Is this a 30 day or 90 day RX:1

## 2023-11-08 LAB
FINAL PATHOLOGIC DIAGNOSIS: NORMAL
Lab: NORMAL

## 2023-11-13 ENCOUNTER — HOSPITAL ENCOUNTER (OUTPATIENT)
Facility: HOSPITAL | Age: 33
Discharge: HOME | End: 2023-11-14
Attending: OBSTETRICS & GYNECOLOGY | Admitting: STUDENT IN AN ORGANIZED HEALTH CARE EDUCATION/TRAINING PROGRAM
Payer: MEDICAID

## 2023-11-14 VITALS — TEMPERATURE: 98 F | SYSTOLIC BLOOD PRESSURE: 122 MMHG | DIASTOLIC BLOOD PRESSURE: 70 MMHG | RESPIRATION RATE: 18 BRPM

## 2023-11-14 LAB — POCT GLUCOSE: 124 MG/DL (ref 70–110)

## 2023-11-14 PROCEDURE — 99211 OFF/OP EST MAY X REQ PHY/QHP: CPT

## 2023-11-14 NOTE — NURSING
Pt arrived via EMS at 0005 with complaints of localized left sided abdominal pain. Pt states she felt weak and dizzy and fell in bathroom around 2230. She proceeded to vomit and felt better. VSS, pt placed on monitors. Hx and physical assessment completed. No contractions, LOF or vaginal bleeding. Pt rating pain 4/10 and decreasing since resting on stretcher.  BS done. Reactive fetal strip.    Dr Lee called at 0031 and notified of pt assessment. Orders to discharge home and follow up if further pain or complications.   All questions answered. Pt left ambulatory at 0055, AVS in hand.     Alysia Davila RN

## 2023-11-14 NOTE — PROGRESS NOTES
OB/GYN Labor and Delivery Triage Note    Subjective:  Pt presents with CC of: Left side pain after fall in bathroom (syncopal episode)       denies Contraction,  Vaginal bleeding,  Leakage of fluid, reports good fetal movement    Pregnancy is otherwise complicated by: gest DM    Her OBGYN is: Dr. Simon  Next appointment is: 23    Objective:  /70   Temp 98.4 °F (36.9 °C) (Oral)   Resp 18   LMP 2023 (Exact Date)     Fetal Heart Tracing  Baseline: 140  Variability: mod  Accelerations: + appropriate for gestational age  Decelerations: -    Contractions:  [place]: none          Assessment and Plan:   33 y.o.F  with IUP@ 25w3d        Abdominal pain  -improved, no contractions   -VSS, no further dizziness in triage  -FHT reviewed, appropriate for gestational age  -bedside blood glucose 124   -Dispo: home         Anjali Lee MD

## 2023-12-19 ENCOUNTER — ROUTINE PRENATAL (OUTPATIENT)
Dept: OBSTETRICS AND GYNECOLOGY | Facility: CLINIC | Age: 33
End: 2023-12-19
Payer: MEDICAID

## 2023-12-19 VITALS
DIASTOLIC BLOOD PRESSURE: 77 MMHG | SYSTOLIC BLOOD PRESSURE: 119 MMHG | WEIGHT: 262.25 LBS | BODY MASS INDEX: 47.96 KG/M2

## 2023-12-19 DIAGNOSIS — Z98.891 HISTORY OF CESAREAN SECTION: ICD-10-CM

## 2023-12-19 DIAGNOSIS — Z3A.30 30 WEEKS GESTATION OF PREGNANCY: Primary | ICD-10-CM

## 2023-12-19 PROCEDURE — 99212 OFFICE O/P EST SF 10 MIN: CPT | Mod: PBBFAC,PO | Performed by: OBSTETRICS & GYNECOLOGY

## 2023-12-19 PROCEDURE — 99213 PR OFFICE/OUTPT VISIT, EST, LEVL III, 20-29 MIN: ICD-10-PCS | Mod: TH,S$PBB,, | Performed by: OBSTETRICS & GYNECOLOGY

## 2023-12-19 PROCEDURE — 99999 PR PBB SHADOW E&M-EST. PATIENT-LVL II: ICD-10-PCS | Mod: PBBFAC,,, | Performed by: OBSTETRICS & GYNECOLOGY

## 2023-12-19 PROCEDURE — 99999 PR PBB SHADOW E&M-EST. PATIENT-LVL II: CPT | Mod: PBBFAC,,, | Performed by: OBSTETRICS & GYNECOLOGY

## 2023-12-19 PROCEDURE — 99213 OFFICE O/P EST LOW 20 MIN: CPT | Mod: TH,S$PBB,, | Performed by: OBSTETRICS & GYNECOLOGY

## 2023-12-19 NOTE — PROGRESS NOTES
Complaining for about 2 weeks of lower abdominal pain at night and while walking. Also having nighttime dizziness, nausea, vomiting. Did not bring glucose log because no one at pharmacy gave her anything. Says she's been eating less bread, more fruits and vegetables. Denies vaginal bleeding or contractions. Good FM. Growth scan ordered for next visit.     Vitals signs, FHTs in 140s, urine dip, and PE findings documented, reviewed and available in OB flow chart.       I spent a total of 20 minutes on the day of the visit.This includes face to face time and non-face to face time preparing to see the patient (eg, review of tests), Obtaining and/or reviewing separately obtained history, Documenting clinical information in the electronic or other health record, Independently interpreting resultsand communicating results to the patient/family/caregiver, or Care coordination.     Coffective Motivation Document discussed with mother. Reinforced benefits of breastfeeding, risk of formula, and cue based feedings. Encouraged mother to download Coffective mobile aydee if she has not already done so. Mother verbalizes understanding.      Nuno Garsia MD  [employer] [facility] PGY-3

## 2023-12-30 ENCOUNTER — PATIENT MESSAGE (OUTPATIENT)
Dept: OTHER | Facility: OTHER | Age: 33
End: 2023-12-30
Payer: MEDICAID

## 2024-01-02 ENCOUNTER — ROUTINE PRENATAL (OUTPATIENT)
Dept: OBSTETRICS AND GYNECOLOGY | Facility: CLINIC | Age: 34
End: 2024-01-02
Payer: MEDICAID

## 2024-01-02 ENCOUNTER — HOSPITAL ENCOUNTER (OUTPATIENT)
Facility: HOSPITAL | Age: 34
Discharge: HOME | End: 2024-01-02
Attending: OBSTETRICS & GYNECOLOGY | Admitting: OBSTETRICS & GYNECOLOGY
Payer: MEDICAID

## 2024-01-02 ENCOUNTER — HOSPITAL ENCOUNTER (OUTPATIENT)
Dept: OBSTETRICS AND GYNECOLOGY | Facility: HOSPITAL | Age: 34
Discharge: HOME | End: 2024-01-02
Attending: OBSTETRICS & GYNECOLOGY
Payer: MEDICAID

## 2024-01-02 ENCOUNTER — TELEPHONE (OUTPATIENT)
Dept: OBSTETRICS AND GYNECOLOGY | Facility: CLINIC | Age: 34
End: 2024-01-02

## 2024-01-02 VITALS — BODY MASS INDEX: 47.92 KG/M2 | DIASTOLIC BLOOD PRESSURE: 77 MMHG | WEIGHT: 262 LBS | SYSTOLIC BLOOD PRESSURE: 116 MMHG

## 2024-01-02 VITALS
HEART RATE: 72 BPM | SYSTOLIC BLOOD PRESSURE: 129 MMHG | DIASTOLIC BLOOD PRESSURE: 69 MMHG | OXYGEN SATURATION: 98 % | RESPIRATION RATE: 18 BRPM

## 2024-01-02 DIAGNOSIS — Z3A.32 32 WEEKS GESTATION OF PREGNANCY: ICD-10-CM

## 2024-01-02 DIAGNOSIS — O28.8 NON-REACTIVE NST (NON-STRESS TEST): ICD-10-CM

## 2024-01-02 DIAGNOSIS — Z3A.32 32 WEEKS GESTATION OF PREGNANCY: Primary | ICD-10-CM

## 2024-01-02 DIAGNOSIS — O24.419 GESTATIONAL DIABETES MELLITUS (GDM) IN THIRD TRIMESTER, GESTATIONAL DIABETES METHOD OF CONTROL UNSPECIFIED: ICD-10-CM

## 2024-01-02 LAB
BACTERIA #/AREA URNS HPF: ABNORMAL /HPF
BILIRUB UR QL STRIP: NEGATIVE
CLARITY UR: ABNORMAL
COLOR UR: YELLOW
GLUCOSE UR QL STRIP: ABNORMAL
HGB UR QL STRIP: NEGATIVE
HYALINE CASTS #/AREA URNS LPF: 1 /LPF
KETONES UR QL STRIP: NEGATIVE
LEUKOCYTE ESTERASE UR QL STRIP: ABNORMAL
MICROSCOPIC COMMENT: ABNORMAL
NITRITE UR QL STRIP: NEGATIVE
PH UR STRIP: 7 [PH] (ref 5–8)
PROT UR QL STRIP: NEGATIVE
RBC #/AREA URNS HPF: 2 /HPF (ref 0–4)
SP GR UR STRIP: 1.01 (ref 1–1.03)
SQUAMOUS #/AREA URNS HPF: 44 /HPF
URN SPEC COLLECT METH UR: ABNORMAL
UROBILINOGEN UR STRIP-ACNC: NEGATIVE EU/DL
WBC #/AREA URNS HPF: 5 /HPF (ref 0–5)

## 2024-01-02 PROCEDURE — 59025 FETAL NON-STRESS TEST: CPT | Mod: 26,,, | Performed by: OBSTETRICS & GYNECOLOGY

## 2024-01-02 PROCEDURE — 59025 FETAL NON-STRESS TEST: CPT | Mod: 59

## 2024-01-02 PROCEDURE — 99213 OFFICE O/P EST LOW 20 MIN: CPT | Mod: TH,S$PBB,25, | Performed by: OBSTETRICS & GYNECOLOGY

## 2024-01-02 PROCEDURE — 99212 OFFICE O/P EST SF 10 MIN: CPT | Mod: PBBFAC,TH,PO | Performed by: OBSTETRICS & GYNECOLOGY

## 2024-01-02 PROCEDURE — 99999 PR PBB SHADOW E&M-EST. PATIENT-LVL II: CPT | Mod: PBBFAC,,, | Performed by: OBSTETRICS & GYNECOLOGY

## 2024-01-02 PROCEDURE — 99211 OFF/OP EST MAY X REQ PHY/QHP: CPT | Mod: 25,27

## 2024-01-02 PROCEDURE — 87086 URINE CULTURE/COLONY COUNT: CPT | Performed by: STUDENT IN AN ORGANIZED HEALTH CARE EDUCATION/TRAINING PROGRAM

## 2024-01-02 RX ORDER — METFORMIN HYDROCHLORIDE 500 MG/1
500 TABLET ORAL 2 TIMES DAILY WITH MEALS
Qty: 160 TABLET | Refills: 0 | Status: SHIPPED | OUTPATIENT
Start: 2024-01-02 | End: 2024-01-09 | Stop reason: DRUGHIGH

## 2024-01-02 NOTE — PROGRESS NOTES
Patient feeling better today with regard to lower abdominal pain last time, now reports some leg and low back pain with walking only. Denies vaginal bleeding or contractions. Good FM. Has been checking blood sugars 4-5x/day, minimum 90, maximum 150 - says have frequently been in 130's. Growth scan ordered. Plan for weekly NST 2/2 GDM, patient to start on 500 mg BID metofrmin. RTC in 2 weeks.     Vitals signs, FHTs 150, urine dip, and PE findings documented, reviewed and available in OB flow chart.       I spent a total of 20 minutes on the day of the visit.This includes face to face time and non-face to face time preparing to see the patient (eg, review of tests), Obtaining and/or reviewing separately obtained history, Documenting clinical information in the electronic or other health record, Independently interpreting resultsand communicating results to the patient/family/caregiver, or Care coordination.     Coffective counseling sheet Build Your Team discussed with mother. Reinforced importance of early identification of support team including champion, OB provider, pediatrician and local community resources. Encouraged mother to download Coffective mobile aydee if she has not already done so.  Mother verbalizes understanding. Also discussed quiet time and delayed bathing.

## 2024-01-02 NOTE — NURSING
Pt was here to do NST but variables were noted.  DR. Simon ordered a BPP for fetal wellbeing.  Converted to triage pt is a  @ 32w3d IUP  No complaints at this time.

## 2024-01-03 NOTE — NURSING
Dr. Brush on unit BPP done 8/8. Ok to discharge home instructed to return Thursday at 1pm for repeat NST and see  once a week until delivered.  Pt verbalized understanding. Condition stable.

## 2024-01-03 NOTE — PROGRESS NOTES
Fetal Assessment Report    Patient admitted to prenatal testing for NST.    Indication: Gestational diabetes    G 3 P 2 A  L2    GA: 32w3d    EDC: 24  Test results:reactive but non reassuring    FHR baseline: 125-130    Accelerations last 15 seconds or greater with a minimum of 2 accelerations noted in 20 minutes. yes    Decelerations: varriable decels    Contractions: irregular    Reports: Denies any complaints    VS:  BP-125/69   P- 79 R-18    Patient educated on normal fetal movement, signs of labor, and when to come to L &D.    Repeat testing as scheduled.       Last Resulted: 24 17:09 CST      BPP

## 2024-01-04 LAB
BACTERIA UR CULT: NORMAL
BACTERIA UR CULT: NORMAL

## 2024-01-04 RX ORDER — LANCETS
EACH MISCELLANEOUS
Qty: 200 EACH | Refills: 8 | Status: SHIPPED | OUTPATIENT
Start: 2024-01-04 | End: 2024-01-27 | Stop reason: SDUPTHER

## 2024-01-04 RX ORDER — INSULIN PUMP SYRINGE, 3 ML
EACH MISCELLANEOUS
Qty: 1 EACH | Refills: 0 | Status: ON HOLD | OUTPATIENT
Start: 2024-01-04 | End: 2024-02-17

## 2024-01-04 NOTE — TELEPHONE ENCOUNTER
Refill Routing Note   Medication(s) are not appropriate for processing by [facility] for the following reason(s):        Required labs outdated  New or recently adjusted medication    [facility] action(s):  Defer               Appointments  past 12m or future 3m with PCP    Date Provider   Last Visit   1/2/2024 Enrrique Simon MD   Next Visit   Visit date not found Enrrique Simon MD   ED visits in past 90 days: 0        Note composed:8:59 AM 01/04/2024

## 2024-01-05 ENCOUNTER — HOSPITAL ENCOUNTER (OUTPATIENT)
Facility: HOSPITAL | Age: 34
Discharge: HOME | End: 2024-01-05
Attending: OBSTETRICS & GYNECOLOGY | Admitting: OBSTETRICS & GYNECOLOGY
Payer: MEDICAID

## 2024-01-05 ENCOUNTER — HOSPITAL ENCOUNTER (OUTPATIENT)
Dept: OBSTETRICS AND GYNECOLOGY | Facility: HOSPITAL | Age: 34
Discharge: HOME | End: 2024-01-05
Attending: OBSTETRICS & GYNECOLOGY
Payer: MEDICAID

## 2024-01-05 VITALS
RESPIRATION RATE: 16 BRPM | DIASTOLIC BLOOD PRESSURE: 73 MMHG | HEART RATE: 84 BPM | TEMPERATURE: 98 F | OXYGEN SATURATION: 99 % | SYSTOLIC BLOOD PRESSURE: 120 MMHG

## 2024-01-05 VITALS
OXYGEN SATURATION: 98 % | SYSTOLIC BLOOD PRESSURE: 136 MMHG | HEART RATE: 94 BPM | DIASTOLIC BLOOD PRESSURE: 79 MMHG | RESPIRATION RATE: 18 BRPM

## 2024-01-05 DIAGNOSIS — O24.419 GESTATIONAL DIABETES MELLITUS (GDM) IN THIRD TRIMESTER, GESTATIONAL DIABETES METHOD OF CONTROL UNSPECIFIED: ICD-10-CM

## 2024-01-05 DIAGNOSIS — Z3A.32 32 WEEKS GESTATION OF PREGNANCY: ICD-10-CM

## 2024-01-05 PROCEDURE — 59025 FETAL NON-STRESS TEST: CPT

## 2024-01-05 PROCEDURE — 99211 OFF/OP EST MAY X REQ PHY/QHP: CPT | Mod: 25

## 2024-01-05 PROCEDURE — 59025 FETAL NON-STRESS TEST: CPT | Mod: 26,,, | Performed by: OBSTETRICS & GYNECOLOGY

## 2024-01-05 NOTE — NURSING
Pt admitted to room 357 from prenatal testing for BPP, oriented to room, call harley in reach. Dr Simon notified by SAMMY Farris RN of prenatal testing result and BPP being ordered.

## 2024-01-05 NOTE — DISCHARGE INSTRUCTIONS
Llama a la clinica para obtener moriah con Dr Simon para el proximo Lydia 01/09/2024. Sigue con sitas de Prenatal Testing/Non stress test.

## 2024-01-05 NOTE — NURSING
Pt given AVS and discharge instructions in [place], pt verbalized understanding of instructions, pt stated she was waiting for call from clinic to set appointment with Dr Simon next Tuesday, NST has been scheduled. Encouraged pt to call clinic again to get clinic appointment scheduled. Discharged home ambulatory with no distress noted.

## 2024-01-08 NOTE — PROGRESS NOTES
1/5/2024  32w6d  Indication for Test: GDM  BP: 136/79  Pulse: 94  Resp: 18  SpO2: 98 %  Interpretation Baby A  Comments: Unable to obtain continuous fetal tracing    Patient has no complaints today and reports positive fetal movement. Gross fetal movement audible on fetal monitor. Audible FHR accelerations heard, but unable to obtain continuous fetal HR tracing despite multiple position changes. Dr. Simon notified and patient sent to L&D for BPP.       Last Resulted: 01/05/24 13:40 CST      BPP was 8/8

## 2024-01-09 ENCOUNTER — ROUTINE PRENATAL (OUTPATIENT)
Dept: OBSTETRICS AND GYNECOLOGY | Facility: CLINIC | Age: 34
End: 2024-01-09
Payer: MEDICAID

## 2024-01-09 ENCOUNTER — HOSPITAL ENCOUNTER (OUTPATIENT)
Dept: OBSTETRICS AND GYNECOLOGY | Facility: HOSPITAL | Age: 34
Discharge: HOME | End: 2024-01-09
Attending: OBSTETRICS & GYNECOLOGY
Payer: MEDICAID

## 2024-01-09 VITALS
RESPIRATION RATE: 18 BRPM | WEIGHT: 260.56 LBS | OXYGEN SATURATION: 96 % | HEART RATE: 105 BPM | DIASTOLIC BLOOD PRESSURE: 85 MMHG | DIASTOLIC BLOOD PRESSURE: 71 MMHG | SYSTOLIC BLOOD PRESSURE: 116 MMHG | SYSTOLIC BLOOD PRESSURE: 147 MMHG | HEART RATE: 82 BPM | BODY MASS INDEX: 47.66 KG/M2

## 2024-01-09 DIAGNOSIS — O24.419 GESTATIONAL DIABETES MELLITUS (GDM) IN THIRD TRIMESTER, GESTATIONAL DIABETES METHOD OF CONTROL UNSPECIFIED: ICD-10-CM

## 2024-01-09 DIAGNOSIS — Z3A.32 32 WEEKS GESTATION OF PREGNANCY: ICD-10-CM

## 2024-01-09 DIAGNOSIS — Z3A.33 33 WEEKS GESTATION OF PREGNANCY: Primary | ICD-10-CM

## 2024-01-09 PROCEDURE — 59025 FETAL NON-STRESS TEST: CPT

## 2024-01-09 PROCEDURE — 59025 FETAL NON-STRESS TEST: CPT | Mod: 26,,, | Performed by: OBSTETRICS & GYNECOLOGY

## 2024-01-09 PROCEDURE — 99999 PR PBB SHADOW E&M-EST. PATIENT-LVL II: CPT | Mod: PBBFAC,,, | Performed by: OBSTETRICS & GYNECOLOGY

## 2024-01-09 PROCEDURE — 99213 OFFICE O/P EST LOW 20 MIN: CPT | Mod: TH,S$PBB,25, | Performed by: OBSTETRICS & GYNECOLOGY

## 2024-01-09 PROCEDURE — 99212 OFFICE O/P EST SF 10 MIN: CPT | Mod: PBBFAC,25,TH,PO | Performed by: OBSTETRICS & GYNECOLOGY

## 2024-01-09 RX ORDER — METFORMIN HYDROCHLORIDE 1000 MG/1
1000 TABLET ORAL 2 TIMES DAILY WITH MEALS
Qty: 180 TABLET | Refills: 0 | Status: SHIPPED | OUTPATIENT
Start: 2024-01-09 | End: 2024-01-09 | Stop reason: CLARIF

## 2024-01-09 RX ORDER — METFORMIN HYDROCHLORIDE 500 MG/1
1000 TABLET ORAL 2 TIMES DAILY WITH MEALS
Qty: 320 TABLET | Refills: 0 | Status: ON HOLD | OUTPATIENT
Start: 2024-01-09 | End: 2024-02-17

## 2024-01-09 RX ORDER — METFORMIN HYDROCHLORIDE 500 MG/1
500 TABLET ORAL 2 TIMES DAILY WITH MEALS
Qty: 160 TABLET | Refills: 0 | Status: SHIPPED | OUTPATIENT
Start: 2024-01-09 | End: 2024-01-09 | Stop reason: SDUPTHER

## 2024-01-09 NOTE — PROGRESS NOTES
2024    33w3d  Indication for Test: GDM  BP: 116/71  Pulse: 82  Resp: 18  SpO2: 96 %  Nonstress Test Baby A  Variability: Moderate  Decels: None  Accels: Present  Baseline FHR: 135  Interpretation Baby A  Nonstress Test Interpretation: Reactive  Overall Impression: Reassuring    Patient has no complaints today and reports positive fetal movement. Fetal movement and hiccups noted during testing.       Last Resulted: 24 14:15 CST

## 2024-01-09 NOTE — PROGRESS NOTES
Patient here at 33w3d with no complaints. Denies vaginal bleeding or contractions. Good FM. RTC in 2 weeks. Checking blood sugars at home - fasting , > 200 post-prandial, down to 170-180 after about an hour. Taking the metformin 500 BID. Reassuring NST done today. Plan to raise dose metformin to 1,000 mg BID. RTC in 1 week.      Vitals signs, FHTs in 130's, urine dip, and PE findings documented, reviewed and available in OB flow chart.       I spent a total of 20 minutes on the day of the visit.This includes face to face time and non-face to face time preparing to see the patient (eg, review of tests), Obtaining and/or reviewing separately obtained history, Documenting clinical information in the electronic or other health record, Independently interpreting resultsand communicating results to the patient/family/caregiver, or Care coordination.     Nuno Garsia MD  [facility] Family Medicine PGY-3

## 2024-01-12 ENCOUNTER — HOSPITAL ENCOUNTER (OUTPATIENT)
Facility: HOSPITAL | Age: 34
Discharge: HOME | End: 2024-01-12
Attending: OBSTETRICS & GYNECOLOGY | Admitting: STUDENT IN AN ORGANIZED HEALTH CARE EDUCATION/TRAINING PROGRAM
Payer: MEDICAID

## 2024-01-12 ENCOUNTER — HOSPITAL ENCOUNTER (OUTPATIENT)
Dept: OBSTETRICS AND GYNECOLOGY | Facility: HOSPITAL | Age: 34
Discharge: HOME | End: 2024-01-12
Attending: OBSTETRICS & GYNECOLOGY
Payer: MEDICAID

## 2024-01-12 VITALS
HEART RATE: 80 BPM | SYSTOLIC BLOOD PRESSURE: 125 MMHG | DIASTOLIC BLOOD PRESSURE: 71 MMHG | OXYGEN SATURATION: 98 % | RESPIRATION RATE: 20 BRPM

## 2024-01-12 LAB
ACCELERATIONS > 10BPM: NORMAL
ACCELERATIONS > 15 BPM: NORMAL
ACOUSTIC STIMULATION: NORMAL
DECELERATIONS: NORMAL
FHR VARIABILITIES: NORMAL
NST ASSESSMENT: NORMAL
NST BASELINE: NORMAL
NST DURATION: NORMAL
NST INDICATIONS: NORMAL
NST LOCATIONS: NORMAL
NST READ BY: NORMAL
NST RETURN: NORMAL
UTERINE ACTIVITY: NORMAL

## 2024-01-12 PROCEDURE — 59025 FETAL NON-STRESS TEST: CPT

## 2024-01-12 NOTE — DISCHARGE INSTRUCTIONS
Patient advised to Follow Labor Precautions:  Call MD or return to Labor and Delivery if...     Labor (after 36 weeks)  - Painful contractions every 5 minutes for 2 hours that do not go away with 2 bottles of water, 2 tylenol, and rest.       Labor (before 36 weeks)  - More than 4 contractions in 1 hour   -First try 2 bottles of water, rest, and 2 tylenol.... If the contractions do not go away call doctors office or after hours clinic first and/or come to hospital for evaluation.       Water Breaks  - Gush or leaking of fluid from vagina, or if unsure.      Vaginal bleeding  - Bright red bleeding like a period, soaking a pad in 1 hour.     Decreased or No fetal movement  - You should feel 10 movements within two hours.  -If you are not feeling the baby move.... Drink a glass of orange juice or apple juice  - If you DO NOT feel 10 movements within two hours, please  call the MD or come to the hospital.     Unable to keep fluids or food down for more than 24 hours     Blurred vision, spots before your eyes, dizziness, headache that does not get better with Tylenol (Acetaminophen), bad swelling, chest pain, or trouble breathing.     Drink 10-12 glasses of water daily  Take medications as prescribed   Patient voiced understanding of the above D/C instructions.  Patient ambulated off of the unit without difficulty.

## 2024-01-16 ENCOUNTER — HOSPITAL ENCOUNTER (OUTPATIENT)
Dept: OBSTETRICS AND GYNECOLOGY | Facility: HOSPITAL | Age: 34
Discharge: HOME | End: 2024-01-16
Payer: MEDICAID

## 2024-01-16 ENCOUNTER — ROUTINE PRENATAL (OUTPATIENT)
Dept: OBSTETRICS AND GYNECOLOGY | Facility: CLINIC | Age: 34
End: 2024-01-16
Payer: MEDICAID

## 2024-01-16 VITALS
SYSTOLIC BLOOD PRESSURE: 118 MMHG | HEART RATE: 85 BPM | SYSTOLIC BLOOD PRESSURE: 113 MMHG | RESPIRATION RATE: 20 BRPM | DIASTOLIC BLOOD PRESSURE: 77 MMHG | DIASTOLIC BLOOD PRESSURE: 73 MMHG | WEIGHT: 263.13 LBS | BODY MASS INDEX: 48.13 KG/M2 | OXYGEN SATURATION: 95 %

## 2024-01-16 DIAGNOSIS — Z3A.32 32 WEEKS GESTATION OF PREGNANCY: ICD-10-CM

## 2024-01-16 DIAGNOSIS — O24.414 INSULIN CONTROLLED GESTATIONAL DIABETES MELLITUS (GDM) IN THIRD TRIMESTER: ICD-10-CM

## 2024-01-16 DIAGNOSIS — R35.0 FREQUENCY OF MICTURITION: ICD-10-CM

## 2024-01-16 DIAGNOSIS — Z3A.34 34 WEEKS GESTATION OF PREGNANCY: Primary | ICD-10-CM

## 2024-01-16 DIAGNOSIS — O24.419 GESTATIONAL DIABETES MELLITUS (GDM) IN THIRD TRIMESTER, GESTATIONAL DIABETES METHOD OF CONTROL UNSPECIFIED: ICD-10-CM

## 2024-01-16 PROCEDURE — 59025 FETAL NON-STRESS TEST: CPT | Mod: 26,,, | Performed by: OBSTETRICS & GYNECOLOGY

## 2024-01-16 PROCEDURE — 59025 FETAL NON-STRESS TEST: CPT

## 2024-01-16 PROCEDURE — 99213 OFFICE O/P EST LOW 20 MIN: CPT | Mod: TH,S$PBB,25, | Performed by: OBSTETRICS & GYNECOLOGY

## 2024-01-16 PROCEDURE — 99999 PR PBB SHADOW E&M-EST. PATIENT-LVL II: CPT | Mod: PBBFAC,,, | Performed by: OBSTETRICS & GYNECOLOGY

## 2024-01-16 PROCEDURE — 99212 OFFICE O/P EST SF 10 MIN: CPT | Mod: PBBFAC,25,TH,PO | Performed by: OBSTETRICS & GYNECOLOGY

## 2024-01-16 RX ORDER — SYRINGE AND NEEDLE,INSULIN,1ML 31GX15/64"
1 SYRINGE, EMPTY DISPOSABLE MISCELLANEOUS 4 TIMES DAILY
Qty: 100 EACH | Refills: 1 | Status: ON HOLD | OUTPATIENT
Start: 2024-01-16 | End: 2024-02-17

## 2024-01-16 RX ORDER — INSULIN HUMAN 100 [IU]/ML
INJECTION, SUSPENSION SUBCUTANEOUS
Qty: 30 ML | Refills: 2 | Status: ON HOLD | OUTPATIENT
Start: 2024-01-16 | End: 2024-02-17

## 2024-01-16 RX ORDER — INSULIN HUMAN 100 [IU]/ML
INJECTION, SUSPENSION SUBCUTANEOUS
Qty: 183.6 ML | Refills: 0 | Status: SHIPPED | OUTPATIENT
Start: 2024-01-16 | End: 2024-01-16

## 2024-01-16 RX ORDER — INSULIN HUMAN 100 [IU]/ML
INJECTION, SUSPENSION SUBCUTANEOUS
Qty: 30 ML | Refills: 2 | Status: SHIPPED | OUTPATIENT
Start: 2024-01-16 | End: 2024-01-16

## 2024-01-16 NOTE — PROGRESS NOTES
Doing ok, frequency  BS #'s reviewed and rarely in range  Will start insulin today  Dosing calculated at .7units /KG  119 KG  Will take AM--36N/20R   PM  15N/15R  Instructions given on dosing, timing of injection, importance of diet to control BS and avoid hypoglycemia  S/s of hypoglycemia explained and methods to treat it  Scheduled for biweekly testing  Rtc 1 wk  Rx sent to [facility]

## 2024-01-17 NOTE — ADDENDUM NOTE
Encounter addended by: Enrrique Simon MD on: 1/17/2024 4:19 PM   Actions taken: Clinical Note Signed, Charge Capture section accepted

## 2024-01-17 NOTE — PROGRESS NOTES
2024    34w3d  Indication for Test: GDM  BP: 118/73  Pulse: 85  Resp: 20  SpO2: 95 %  Nonstress Test Baby A  Variability: Moderate  Decels: None  Accels: Present  Baseline FHR: 135  Interpretation Baby A  Nonstress Test Interpretation: Reactive  Overall Impression: Reassuring    Patient has no complaints today and reports positive fetal movement. Fetal movement noted throughout testing.       Last Resulted: 24 13:39 CST

## 2024-01-20 ENCOUNTER — PATIENT MESSAGE (OUTPATIENT)
Dept: OTHER | Facility: OTHER | Age: 34
End: 2024-01-20
Payer: MEDICAID

## 2024-01-23 ENCOUNTER — ROUTINE PRENATAL (OUTPATIENT)
Dept: OBSTETRICS AND GYNECOLOGY | Facility: CLINIC | Age: 34
End: 2024-01-23
Payer: MEDICAID

## 2024-01-23 ENCOUNTER — HOSPITAL ENCOUNTER (OUTPATIENT)
Dept: OBSTETRICS AND GYNECOLOGY | Facility: HOSPITAL | Age: 34
Discharge: HOME | End: 2024-01-23
Attending: OBSTETRICS & GYNECOLOGY
Payer: MEDICAID

## 2024-01-23 VITALS
WEIGHT: 262 LBS | SYSTOLIC BLOOD PRESSURE: 118 MMHG | DIASTOLIC BLOOD PRESSURE: 70 MMHG | OXYGEN SATURATION: 96 % | SYSTOLIC BLOOD PRESSURE: 128 MMHG | BODY MASS INDEX: 47.92 KG/M2 | HEART RATE: 82 BPM | RESPIRATION RATE: 20 BRPM | DIASTOLIC BLOOD PRESSURE: 73 MMHG | HEART RATE: 75 BPM

## 2024-01-23 DIAGNOSIS — Z3A.32 32 WEEKS GESTATION OF PREGNANCY: ICD-10-CM

## 2024-01-23 DIAGNOSIS — O24.419 GESTATIONAL DIABETES MELLITUS (GDM) IN THIRD TRIMESTER, GESTATIONAL DIABETES METHOD OF CONTROL UNSPECIFIED: ICD-10-CM

## 2024-01-23 DIAGNOSIS — Z3A.35 35 WEEKS GESTATION OF PREGNANCY: Primary | ICD-10-CM

## 2024-01-23 DIAGNOSIS — Z3A.39 39 WEEKS GESTATION OF PREGNANCY: ICD-10-CM

## 2024-01-23 PROCEDURE — 99213 OFFICE O/P EST LOW 20 MIN: CPT | Mod: TH,S$PBB,25, | Performed by: OBSTETRICS & GYNECOLOGY

## 2024-01-23 PROCEDURE — 99213 OFFICE O/P EST LOW 20 MIN: CPT | Mod: PBBFAC,25,PO | Performed by: OBSTETRICS & GYNECOLOGY

## 2024-01-23 PROCEDURE — 87081 CULTURE SCREEN ONLY: CPT | Performed by: OBSTETRICS & GYNECOLOGY

## 2024-01-23 PROCEDURE — 99999 PR PBB SHADOW E&M-EST. PATIENT-LVL III: CPT | Mod: PBBFAC,,, | Performed by: OBSTETRICS & GYNECOLOGY

## 2024-01-23 PROCEDURE — 59025 FETAL NON-STRESS TEST: CPT | Mod: 26,,, | Performed by: OBSTETRICS & GYNECOLOGY

## 2024-01-23 PROCEDURE — 59025 FETAL NON-STRESS TEST: CPT

## 2024-01-23 NOTE — PROGRESS NOTES
2024    35w3d  Indication for Test: GDM  BP: 128/70  Pulse: 75  Resp: 20  SpO2: 96 %  Nonstress Test Baby A  Variability: Moderate  Decels: None  Accels: Present  Baseline FHR: 140  Interpretation Baby A  Nonstress Test Interpretation: Reactive  Overall Impression: Reassuring    Patient has no complaints today and reports positive fetal movement. Fetal movement noted during testing.       Last Resulted: 24 14:01 CST

## 2024-01-23 NOTE — PROGRESS NOTES
Doing ok  BS #'s reviewed and insulin adjusted  Fht's 130-40's  Rtc 1 wk  C/s and BTL scheduled 2/20/24 at 7:30 am  Cont acuuchecks and PNT  Patient with no complaints. Denies vaginal bleeding or contractions. Good FM. GBS collected today. Labor precautions discused with patient. RTC in 1 week.     Vitals signs, FHTs, urine dip, and PE findings documented, reviewed and available in OB flow chart.       I spent a total of 20 minutes on the day of the visit.This includes face to face time and non-face to face time preparing to see the patient (eg, review of tests), Obtaining and/or reviewing separately obtained history, Documenting clinical information in the electronic or other health record, Independently interpreting resultsand communicating results to the patient/family/caregiver, or Care coordination.

## 2024-01-26 LAB — BACTERIA SPEC AEROBE CULT: NORMAL

## 2024-01-30 ENCOUNTER — ROUTINE PRENATAL (OUTPATIENT)
Dept: OBSTETRICS AND GYNECOLOGY | Facility: CLINIC | Age: 34
End: 2024-01-30
Payer: MEDICAID

## 2024-01-30 ENCOUNTER — HOSPITAL ENCOUNTER (OUTPATIENT)
Dept: OBSTETRICS AND GYNECOLOGY | Facility: HOSPITAL | Age: 34
Discharge: HOME | End: 2024-01-30
Attending: OBSTETRICS & GYNECOLOGY
Payer: MEDICAID

## 2024-01-30 VITALS
RESPIRATION RATE: 18 BRPM | OXYGEN SATURATION: 98 % | SYSTOLIC BLOOD PRESSURE: 113 MMHG | DIASTOLIC BLOOD PRESSURE: 56 MMHG | HEART RATE: 90 BPM

## 2024-01-30 VITALS
DIASTOLIC BLOOD PRESSURE: 79 MMHG | WEIGHT: 264.56 LBS | BODY MASS INDEX: 48.39 KG/M2 | SYSTOLIC BLOOD PRESSURE: 111 MMHG

## 2024-01-30 DIAGNOSIS — O24.419 GESTATIONAL DIABETES MELLITUS (GDM) IN THIRD TRIMESTER, GESTATIONAL DIABETES METHOD OF CONTROL UNSPECIFIED: ICD-10-CM

## 2024-01-30 DIAGNOSIS — Z3A.36 36 WEEKS GESTATION OF PREGNANCY: Primary | ICD-10-CM

## 2024-01-30 DIAGNOSIS — Z3A.32 32 WEEKS GESTATION OF PREGNANCY: ICD-10-CM

## 2024-01-30 PROCEDURE — 59025 FETAL NON-STRESS TEST: CPT | Mod: 26,,, | Performed by: OBSTETRICS & GYNECOLOGY

## 2024-01-30 PROCEDURE — 59025 FETAL NON-STRESS TEST: CPT

## 2024-01-30 PROCEDURE — 99213 OFFICE O/P EST LOW 20 MIN: CPT | Mod: TH,S$PBB,25, | Performed by: OBSTETRICS & GYNECOLOGY

## 2024-01-30 PROCEDURE — 99999 PR PBB SHADOW E&M-EST. PATIENT-LVL II: CPT | Mod: PBBFAC,,, | Performed by: OBSTETRICS & GYNECOLOGY

## 2024-01-30 PROCEDURE — 99212 OFFICE O/P EST SF 10 MIN: CPT | Mod: PBBFAC,PO | Performed by: OBSTETRICS & GYNECOLOGY

## 2024-01-30 RX ORDER — LANCETS
EACH MISCELLANEOUS
Qty: 200 EACH | Refills: 8 | Status: ON HOLD | OUTPATIENT
Start: 2024-01-30 | End: 2024-02-17

## 2024-01-30 NOTE — PROGRESS NOTES
Doing ok  Fht's  Didn't bring BS #'s today hasn't checked BS in 1 wk bc ran out of lancets  Rx refilled   Taking insulin as directed but unsure of dosing bc someone does her shots for her  Due to por compliance and unsure of control will move c/s up to 38 wks.  R/s'd to 2/15 at 9am  Instructed on importance of glucose control and sending me #'s to assess dosing  Mgmt of hypoglycemia discussed  Patient with no complaints. Denies vaginal bleeding or contractions. Good FM. GBS negative. Labor precautions discused with patient. RTC in 1 week.   Conr biweekly PNT  Vitals signs, FHTs, urine dip, and PE findings documented, reviewed and available in OB flow chart.       I spent a total of 20 minutes on the day of the visit.This includes face to face time and non-face to face time preparing to see the patient (eg, review of tests), Obtaining and/or reviewing separately obtained history, Documenting clinical information in the electronic or other health record, Independently interpreting resultsand communicating results to the patient/family/caregiver, or Care coordination.

## 2024-01-30 NOTE — PROGRESS NOTES
Fetal Assessment Report    Patient admitted to prenatal testing for NST.    Indication: GDM, obesity    G3 2A0L2    GA: 36w3d    EDC: 24  Test results: Reactive and reassuring    FHR baseline: 135    Accelerations last 15 seconds or greater with a minimum of 2 accelerations noted in 20 minutes. Yes multiple accels    Decelerations: none    Contractions: irregular    Reports: denies any complaints    VS:  BP- 113/56  P-90  R-18    Patient educated on normal fetal movement, signs of labor, and when to come to L &D.    Repeat testing as scheduled.       Last Resulted: 24 16:45 CST

## 2024-01-31 ENCOUNTER — PATIENT MESSAGE (OUTPATIENT)
Dept: OBSTETRICS AND GYNECOLOGY | Facility: CLINIC | Age: 34
End: 2024-01-31
Payer: MEDICAID

## 2024-02-05 ENCOUNTER — HOSPITAL ENCOUNTER (EMERGENCY)
Facility: HOSPITAL | Age: 34
Discharge: STILL ADMITTED OR ADMINISTRATIVE | End: 2024-02-05
Attending: EMERGENCY MEDICINE
Payer: MEDICAID

## 2024-02-05 VITALS
HEART RATE: 90 BPM | TEMPERATURE: 99 F | RESPIRATION RATE: 20 BRPM | DIASTOLIC BLOOD PRESSURE: 66 MMHG | BODY MASS INDEX: 46.95 KG/M2 | OXYGEN SATURATION: 99 % | HEIGHT: 63 IN | SYSTOLIC BLOOD PRESSURE: 133 MMHG | WEIGHT: 265 LBS

## 2024-02-05 LAB
BACTERIA #/AREA URNS HPF: NORMAL /HPF
BILIRUB UR QL STRIP: NEGATIVE
CLARITY UR: CLEAR
COLOR UR: YELLOW
GLUCOSE UR QL STRIP: NEGATIVE
HGB UR QL STRIP: NEGATIVE
KETONES UR QL STRIP: ABNORMAL
LEUKOCYTE ESTERASE UR QL STRIP: ABNORMAL
MICROSCOPIC COMMENT: NORMAL
NITRITE UR QL STRIP: NEGATIVE
PH UR STRIP: 7 [PH] (ref 5–8)
PROT UR QL STRIP: NEGATIVE
RBC #/AREA URNS HPF: 2 /HPF (ref 0–4)
SP GR UR STRIP: 1.01 (ref 1–1.03)
SQUAMOUS #/AREA URNS HPF: 13 /HPF
URN SPEC COLLECT METH UR: ABNORMAL
UROBILINOGEN UR STRIP-ACNC: NEGATIVE EU/DL
WBC #/AREA URNS HPF: 3 /HPF (ref 0–5)

## 2024-02-05 PROCEDURE — 99999 HC NO LEVEL OF SERVICE - ED ONLY: CPT

## 2024-02-05 PROCEDURE — 81000 URINALYSIS NONAUTO W/SCOPE: CPT

## 2024-02-06 ENCOUNTER — ROUTINE PRENATAL (OUTPATIENT)
Dept: OBSTETRICS AND GYNECOLOGY | Facility: CLINIC | Age: 34
End: 2024-02-06
Payer: MEDICAID

## 2024-02-06 ENCOUNTER — HOSPITAL ENCOUNTER (OUTPATIENT)
Facility: HOSPITAL | Age: 34
LOS: 1 days | Discharge: HOME | End: 2024-02-06
Attending: OBSTETRICS & GYNECOLOGY | Admitting: OBSTETRICS & GYNECOLOGY
Payer: MEDICAID

## 2024-02-06 ENCOUNTER — HOSPITAL ENCOUNTER (OUTPATIENT)
Dept: OBSTETRICS AND GYNECOLOGY | Facility: HOSPITAL | Age: 34
Discharge: HOME | End: 2024-02-06
Attending: OBSTETRICS & GYNECOLOGY
Payer: MEDICAID

## 2024-02-06 VITALS
HEART RATE: 94 BPM | OXYGEN SATURATION: 98 % | DIASTOLIC BLOOD PRESSURE: 66 MMHG | SYSTOLIC BLOOD PRESSURE: 130 MMHG | RESPIRATION RATE: 18 BRPM

## 2024-02-06 VITALS — DIASTOLIC BLOOD PRESSURE: 63 MMHG | SYSTOLIC BLOOD PRESSURE: 109 MMHG

## 2024-02-06 DIAGNOSIS — O24.419 GESTATIONAL DIABETES MELLITUS (GDM) IN THIRD TRIMESTER, GESTATIONAL DIABETES METHOD OF CONTROL UNSPECIFIED: ICD-10-CM

## 2024-02-06 DIAGNOSIS — O36.8390 NON-REASSURING ELECTRONIC FETAL MONITORING TRACING: ICD-10-CM

## 2024-02-06 DIAGNOSIS — Z3A.32 32 WEEKS GESTATION OF PREGNANCY: ICD-10-CM

## 2024-02-06 DIAGNOSIS — Z3A.37 37 WEEKS GESTATION OF PREGNANCY: Primary | ICD-10-CM

## 2024-02-06 DIAGNOSIS — N30.00 ACUTE CYSTITIS WITHOUT HEMATURIA: Primary | ICD-10-CM

## 2024-02-06 PROCEDURE — 99211 OFF/OP EST MAY X REQ PHY/QHP: CPT | Mod: 25

## 2024-02-06 PROCEDURE — 59025 FETAL NON-STRESS TEST: CPT

## 2024-02-06 PROCEDURE — 99213 OFFICE O/P EST LOW 20 MIN: CPT | Mod: TH,S$PBB,, | Performed by: OBSTETRICS & GYNECOLOGY

## 2024-02-06 RX ORDER — CEPHALEXIN 500 MG/1
500 CAPSULE ORAL 4 TIMES DAILY
Qty: 40 CAPSULE | Refills: 0 | Status: ON HOLD | OUTPATIENT
Start: 2024-02-06 | End: 2024-02-17

## 2024-02-06 NOTE — PLAN OF CARE
Discharge instructions and follow up precautions given with  Josh # [id number].  Pt verb understanding.  Pt will come back Friday for her scheduled nst

## 2024-02-06 NOTE — PLAN OF CARE
Bpp results of 8/8 reported to dr nishant md states pt may be discharged but is to come back Friday for a nst

## 2024-02-06 NOTE — PROGRESS NOTES
Doing better with blood sugars  Fht's 150's  Fastings   Pp  but mostly w/I rnge  Delivery scheduled next week  Had BPP today and was 8/10  Nst Fri and Tuesday delivery Thursday  No change in insulin    Patient with no complaints. Denies vaginal bleeding or contractions. Good FM. RTC in 1 week.     Vitals signs, FHTs, urine dip, and PE findings documented, reviewed and available in OB flow chart.       I spent a total of 20 minutes on the day of the visit.This includes face to face time and non-face to face time preparing to see the patient (eg, review of tests), Obtaining and/or reviewing separately obtained history, Documenting clinical information in the electronic or other health record, Independently interpreting resultsand communicating results to the patient/family/caregiver, or Care coordination.

## 2024-02-09 NOTE — PROGRESS NOTES
2024    37w3d  Indication for Test: GDM, on insulin  BP: 130/66  Pulse: 94  Resp: 18  SpO2: 98 %  Nonstress Test Baby A  Variability: Moderate  Decels: Present  Accels: Present  Baseline FHR: 150  Interpretation Baby A  Nonstress Test Interpretation: Reactive  Overall Impression: Reassuring    Patient has no complaints today and reports positive fetal movement. Fetal movement and hiccups noted during testing. Decel noted at beginning of FHR strip. Strip became reactive and reassuring following. MD notified and reviewed strip. Patient sent to L&D for BPP.       Last Resulted: 24 11:26 CST      BPP done 8/10

## 2024-02-15 ENCOUNTER — ANESTHESIA EVENT (OUTPATIENT)
Dept: OBSTETRICS AND GYNECOLOGY | Facility: HOSPITAL | Age: 34
End: 2024-02-15
Payer: MEDICAID

## 2024-02-15 ENCOUNTER — HOSPITAL ENCOUNTER (INPATIENT)
Facility: HOSPITAL | Age: 34
LOS: 2 days | Discharge: HOME | End: 2024-02-17
Attending: OBSTETRICS & GYNECOLOGY | Admitting: OBSTETRICS & GYNECOLOGY
Payer: MEDICAID

## 2024-02-15 ENCOUNTER — ANESTHESIA (OUTPATIENT)
Dept: OBSTETRICS AND GYNECOLOGY | Facility: HOSPITAL | Age: 34
End: 2024-02-15
Payer: MEDICAID

## 2024-02-15 DIAGNOSIS — O24.419 GESTATIONAL DIABETES MELLITUS (GDM) IN THIRD TRIMESTER, GESTATIONAL DIABETES METHOD OF CONTROL UNSPECIFIED: ICD-10-CM

## 2024-02-15 DIAGNOSIS — Z98.891 HISTORY OF CESAREAN SECTION: ICD-10-CM

## 2024-02-15 DIAGNOSIS — Z3A.38 38 WEEKS GESTATION OF PREGNANCY: ICD-10-CM

## 2024-02-15 LAB
ABO + RH BLD: NORMAL
BASOPHILS # BLD AUTO: 0.03 K/UL (ref 0–0.2)
BASOPHILS NFR BLD: 0.3 % (ref 0–1.9)
BLD GP AB SCN CELLS X3 SERPL QL: NORMAL
DIFFERENTIAL METHOD BLD: ABNORMAL
EOSINOPHIL # BLD AUTO: 0.2 K/UL (ref 0–0.5)
EOSINOPHIL NFR BLD: 1.6 % (ref 0–8)
ERYTHROCYTE [DISTWIDTH] IN BLOOD BY AUTOMATED COUNT: 15.3 % (ref 11.5–14.5)
HCT VFR BLD AUTO: 35.1 % (ref 37–48.5)
HGB BLD-MCNC: 12.5 G/DL (ref 12–16)
IMM GRANULOCYTES # BLD AUTO: 0.05 K/UL (ref 0–0.04)
IMM GRANULOCYTES NFR BLD AUTO: 0.5 % (ref 0–0.5)
LYMPHOCYTES # BLD AUTO: 2.5 K/UL (ref 1–4.8)
LYMPHOCYTES NFR BLD: 23.1 % (ref 18–48)
MCH RBC QN AUTO: 26.9 PG (ref 27–31)
MCHC RBC AUTO-ENTMCNC: 35.6 G/DL (ref 32–36)
MCV RBC AUTO: 76 FL (ref 82–98)
MONOCYTES # BLD AUTO: 0.7 K/UL (ref 0.3–1)
MONOCYTES NFR BLD: 6.9 % (ref 4–15)
NEUTROPHILS # BLD AUTO: 7.2 K/UL (ref 1.8–7.7)
NEUTROPHILS NFR BLD: 67.6 % (ref 38–73)
NRBC BLD-RTO: 0 /100 WBC
PCO2 BLDA: 42.5 MMHG
PCO2 BLDA: 49.5 MMHG
PH SMN: 7.29 [PH] (ref 7.18–7.38)
PH SMN: 7.32 [PH] (ref 7.25–7.45)
PLATELET # BLD AUTO: 255 K/UL (ref 150–450)
PMV BLD AUTO: 12.8 FL (ref 9.2–12.9)
PO2 BLDA: 18.3 MMHG
PO2 BLDA: 22.5 MMHG
POC BASE DEFICIT: -3.2 MMOL/L
POC BASE DEFICIT: -4 MMOL/L
POC HCO3: 22 MMOL/L
POC HCO3: 24 MMOL/L
POC PERFORMED BY: NORMAL
POC PERFORMED BY: NORMAL
POC SATURATED O2: 31.5 %
POC SATURATED O2: 34.8 %
POCT GLUCOSE: 101 MG/DL (ref 70–110)
POCT GLUCOSE: 104 MG/DL (ref 70–110)
POCT GLUCOSE: 90 MG/DL (ref 70–110)
POCT GLUCOSE: 92 MG/DL (ref 70–110)
RBC # BLD AUTO: 4.65 M/UL (ref 4–5.4)
SPECIMEN SOURCE: NORMAL
SPECIMEN SOURCE: NORMAL
WBC # BLD AUTO: 10.67 K/UL (ref 3.9–12.7)

## 2024-02-15 PROCEDURE — 11000001 HC ACUTE MED/SURG PRIVATE ROOM

## 2024-02-15 PROCEDURE — 63600175 PHARM REV CODE 636 W HCPCS: Performed by: NURSE ANESTHETIST, CERTIFIED REGISTERED

## 2024-02-15 PROCEDURE — 25000003 PHARM REV CODE 250: Performed by: OBSTETRICS & GYNECOLOGY

## 2024-02-15 PROCEDURE — 63600175 PHARM REV CODE 636 W HCPCS: Performed by: UROLOGY

## 2024-02-15 PROCEDURE — 25000003 PHARM REV CODE 250: Performed by: NURSE ANESTHETIST, CERTIFIED REGISTERED

## 2024-02-15 PROCEDURE — 0UB70ZZ EXCISION OF BILATERAL FALLOPIAN TUBES, OPEN APPROACH: ICD-10-PCS | Performed by: OBSTETRICS & GYNECOLOGY

## 2024-02-15 PROCEDURE — 82803 BLOOD GASES ANY COMBINATION: CPT

## 2024-02-15 PROCEDURE — 27200688 HC TRAY, SPINAL-HYPER/ ISOBARIC: Performed by: UROLOGY

## 2024-02-15 PROCEDURE — 25000003 PHARM REV CODE 250: Performed by: UROLOGY

## 2024-02-15 PROCEDURE — 58611 LIGATE OVIDUCT(S) ADD-ON: CPT | Mod: ,,, | Performed by: OBSTETRICS & GYNECOLOGY

## 2024-02-15 PROCEDURE — 37000009 HC ANESTHESIA EA ADD 15 MINS: Mod: SZN

## 2024-02-15 PROCEDURE — 86901 BLOOD TYPING SEROLOGIC RH(D): CPT | Performed by: OBSTETRICS & GYNECOLOGY

## 2024-02-15 PROCEDURE — 86592 SYPHILIS TEST NON-TREP QUAL: CPT | Performed by: OBSTETRICS & GYNECOLOGY

## 2024-02-15 PROCEDURE — 88302 TISSUE EXAM BY PATHOLOGIST: CPT | Mod: 26,,, | Performed by: PATHOLOGY

## 2024-02-15 PROCEDURE — 71000033 HC RECOVERY, INTIAL HOUR: Performed by: OBSTETRICS & GYNECOLOGY

## 2024-02-15 PROCEDURE — 37000008 HC ANESTHESIA 1ST 15 MINUTES: Performed by: OBSTETRICS & GYNECOLOGY

## 2024-02-15 PROCEDURE — 36004725 HC OB OR TIME LEV III - EA ADD 15 MIN: Mod: SZN

## 2024-02-15 PROCEDURE — 25000003 PHARM REV CODE 250

## 2024-02-15 PROCEDURE — 99900035 HC TECH TIME PER 15 MIN (STAT)

## 2024-02-15 PROCEDURE — 59514 CESAREAN DELIVERY ONLY: CPT | Mod: QZ,,, | Performed by: NURSE ANESTHETIST, CERTIFIED REGISTERED

## 2024-02-15 PROCEDURE — 36004725 HC OB OR TIME LEV III - EA ADD 15 MIN: Performed by: OBSTETRICS & GYNECOLOGY

## 2024-02-15 PROCEDURE — 71000039 HC RECOVERY, EACH ADD'L HOUR: Performed by: OBSTETRICS & GYNECOLOGY

## 2024-02-15 PROCEDURE — 37000009 HC ANESTHESIA EA ADD 15 MINS: Performed by: OBSTETRICS & GYNECOLOGY

## 2024-02-15 PROCEDURE — 36004724 HC OB OR TIME LEV III - 1ST 15 MIN: Performed by: OBSTETRICS & GYNECOLOGY

## 2024-02-15 PROCEDURE — 72100002 HC LABOR CARE, 1ST 8 HOURS

## 2024-02-15 PROCEDURE — 85025 COMPLETE CBC W/AUTO DIFF WBC: CPT | Performed by: OBSTETRICS & GYNECOLOGY

## 2024-02-15 PROCEDURE — 88302 TISSUE EXAM BY PATHOLOGIST: CPT | Performed by: PATHOLOGY

## 2024-02-15 PROCEDURE — 36415 COLL VENOUS BLD VENIPUNCTURE: CPT | Performed by: OBSTETRICS & GYNECOLOGY

## 2024-02-15 PROCEDURE — 36416 COLLJ CAPILLARY BLOOD SPEC: CPT

## 2024-02-15 PROCEDURE — 63600175 PHARM REV CODE 636 W HCPCS: Performed by: OBSTETRICS & GYNECOLOGY

## 2024-02-15 PROCEDURE — 59514 CESAREAN DELIVERY ONLY: CPT | Mod: GB,,, | Performed by: OBSTETRICS & GYNECOLOGY

## 2024-02-15 PROCEDURE — 51702 INSERT TEMP BLADDER CATH: CPT

## 2024-02-15 RX ORDER — METHYLERGONOVINE MALEATE 0.2 MG/ML
200 INJECTION INTRAVENOUS ONCE AS NEEDED
Status: DISCONTINUED | OUTPATIENT
Start: 2024-02-15 | End: 2024-02-17 | Stop reason: HOSPADM

## 2024-02-15 RX ORDER — MUPIROCIN 20 MG/G
OINTMENT TOPICAL
Status: DISCONTINUED | OUTPATIENT
Start: 2024-02-15 | End: 2024-02-17 | Stop reason: HOSPADM

## 2024-02-15 RX ORDER — CEFAZOLIN SODIUM 2 G/50ML
2 SOLUTION INTRAVENOUS ONCE
Status: DISCONTINUED | OUTPATIENT
Start: 2024-02-15 | End: 2024-02-15

## 2024-02-15 RX ORDER — ONDANSETRON 8 MG/1
8 TABLET, ORALLY DISINTEGRATING ORAL EVERY 8 HOURS PRN
Status: DISCONTINUED | OUTPATIENT
Start: 2024-02-15 | End: 2024-02-15

## 2024-02-15 RX ORDER — SODIUM CHLORIDE, SODIUM LACTATE, POTASSIUM CHLORIDE, CALCIUM CHLORIDE 600; 310; 30; 20 MG/100ML; MG/100ML; MG/100ML; MG/100ML
INJECTION, SOLUTION INTRAVENOUS CONTINUOUS
Status: DISCONTINUED | OUTPATIENT
Start: 2024-02-15 | End: 2024-02-15

## 2024-02-15 RX ORDER — DIPHENOXYLATE HYDROCHLORIDE AND ATROPINE SULFATE 2.5; .025 MG/1; MG/1
2 TABLET ORAL EVERY 6 HOURS PRN
Status: DISCONTINUED | OUTPATIENT
Start: 2024-02-15 | End: 2024-02-17 | Stop reason: HOSPADM

## 2024-02-15 RX ORDER — DIPHENHYDRAMINE HYDROCHLORIDE 50 MG/ML
INJECTION INTRAMUSCULAR; INTRAVENOUS
Status: DISCONTINUED | OUTPATIENT
Start: 2024-02-15 | End: 2024-02-15

## 2024-02-15 RX ORDER — PROCHLORPERAZINE EDISYLATE 5 MG/ML
5 INJECTION INTRAMUSCULAR; INTRAVENOUS EVERY 6 HOURS PRN
Status: DISCONTINUED | OUTPATIENT
Start: 2024-02-15 | End: 2024-02-17 | Stop reason: HOSPADM

## 2024-02-15 RX ORDER — BISACODYL 10 MG/1
10 SUPPOSITORY RECTAL ONCE AS NEEDED
Status: DISCONTINUED | OUTPATIENT
Start: 2024-02-15 | End: 2024-02-17 | Stop reason: HOSPADM

## 2024-02-15 RX ORDER — OXYCODONE HYDROCHLORIDE 5 MG/1
10 TABLET ORAL EVERY 4 HOURS PRN
Status: DISCONTINUED | OUTPATIENT
Start: 2024-02-15 | End: 2024-02-17 | Stop reason: HOSPADM

## 2024-02-15 RX ORDER — DIPHENHYDRAMINE HCL 25 MG
25 CAPSULE ORAL EVERY 4 HOURS PRN
Status: DISCONTINUED | OUTPATIENT
Start: 2024-02-15 | End: 2024-02-15

## 2024-02-15 RX ORDER — PRENATAL WITH FERROUS FUM AND FOLIC ACID 3080; 920; 120; 400; 22; 1.84; 3; 20; 10; 1; 12; 200; 27; 25; 2 [IU]/1; [IU]/1; MG/1; [IU]/1; MG/1; MG/1; MG/1; MG/1; MG/1; MG/1; UG/1; MG/1; MG/1; MG/1; MG/1
1 TABLET ORAL DAILY
Status: DISCONTINUED | OUTPATIENT
Start: 2024-02-15 | End: 2024-02-17 | Stop reason: HOSPADM

## 2024-02-15 RX ORDER — OXYTOCIN 10 [USP'U]/ML
10 INJECTION, SOLUTION INTRAMUSCULAR; INTRAVENOUS ONCE AS NEEDED
Status: DISCONTINUED | OUTPATIENT
Start: 2024-02-15 | End: 2024-02-17 | Stop reason: HOSPADM

## 2024-02-15 RX ORDER — SIMETHICONE 80 MG
1 TABLET,CHEWABLE ORAL EVERY 6 HOURS PRN
Status: DISCONTINUED | OUTPATIENT
Start: 2024-02-15 | End: 2024-02-17 | Stop reason: HOSPADM

## 2024-02-15 RX ORDER — SODIUM CHLORIDE, SODIUM LACTATE, POTASSIUM CHLORIDE, CALCIUM CHLORIDE 600; 310; 30; 20 MG/100ML; MG/100ML; MG/100ML; MG/100ML
INJECTION, SOLUTION INTRAVENOUS CONTINUOUS PRN
Status: DISCONTINUED | OUTPATIENT
Start: 2024-02-15 | End: 2024-02-15

## 2024-02-15 RX ORDER — SIMETHICONE 80 MG
1 TABLET,CHEWABLE ORAL EVERY 6 HOURS PRN
Status: DISCONTINUED | OUTPATIENT
Start: 2024-02-15 | End: 2024-02-15

## 2024-02-15 RX ORDER — HYDROCORTISONE 25 MG/G
CREAM TOPICAL 3 TIMES DAILY PRN
Status: DISCONTINUED | OUTPATIENT
Start: 2024-02-15 | End: 2024-02-17 | Stop reason: HOSPADM

## 2024-02-15 RX ORDER — MISOPROSTOL 200 UG/1
800 TABLET ORAL ONCE AS NEEDED
Status: DISCONTINUED | OUTPATIENT
Start: 2024-02-15 | End: 2024-02-15

## 2024-02-15 RX ORDER — FAMOTIDINE 10 MG/ML
20 INJECTION INTRAVENOUS ONCE
Status: COMPLETED | OUTPATIENT
Start: 2024-02-15 | End: 2024-02-15

## 2024-02-15 RX ORDER — SODIUM CHLORIDE 0.9 % (FLUSH) 0.9 %
10 SYRINGE (ML) INJECTION
Status: DISCONTINUED | OUTPATIENT
Start: 2024-02-15 | End: 2024-02-17 | Stop reason: HOSPADM

## 2024-02-15 RX ORDER — OXYTOCIN/RINGER'S LACTATE 30/500 ML
95 PLASTIC BAG, INJECTION (ML) INTRAVENOUS ONCE AS NEEDED
Status: DISCONTINUED | OUTPATIENT
Start: 2024-02-15 | End: 2024-02-15

## 2024-02-15 RX ORDER — AMOXICILLIN 250 MG
1 CAPSULE ORAL NIGHTLY PRN
Status: DISCONTINUED | OUTPATIENT
Start: 2024-02-15 | End: 2024-02-15

## 2024-02-15 RX ORDER — MUPIROCIN 20 MG/G
OINTMENT TOPICAL 2 TIMES DAILY
Status: DISCONTINUED | OUTPATIENT
Start: 2024-02-15 | End: 2024-02-17 | Stop reason: HOSPADM

## 2024-02-15 RX ORDER — ADHESIVE BANDAGE
10 BANDAGE TOPICAL EVERY 6 HOURS PRN
Status: DISCONTINUED | OUTPATIENT
Start: 2024-02-16 | End: 2024-02-17 | Stop reason: HOSPADM

## 2024-02-15 RX ORDER — OXYTOCIN/RINGER'S LACTATE 30/500 ML
334 PLASTIC BAG, INJECTION (ML) INTRAVENOUS ONCE
Status: COMPLETED | OUTPATIENT
Start: 2024-02-15 | End: 2024-02-15

## 2024-02-15 RX ORDER — KETOROLAC TROMETHAMINE 30 MG/ML
30 INJECTION, SOLUTION INTRAMUSCULAR; INTRAVENOUS EVERY 6 HOURS
Status: COMPLETED | OUTPATIENT
Start: 2024-02-15 | End: 2024-02-16

## 2024-02-15 RX ORDER — OXYTOCIN/RINGER'S LACTATE 30/500 ML
95 PLASTIC BAG, INJECTION (ML) INTRAVENOUS ONCE
Status: DISCONTINUED | OUTPATIENT
Start: 2024-02-15 | End: 2024-02-17 | Stop reason: HOSPADM

## 2024-02-15 RX ORDER — OXYCODONE AND ACETAMINOPHEN 5; 325 MG/1; MG/1
1 TABLET ORAL EVERY 4 HOURS PRN
Status: DISCONTINUED | OUTPATIENT
Start: 2024-02-16 | End: 2024-02-17 | Stop reason: HOSPADM

## 2024-02-15 RX ORDER — DIPHENHYDRAMINE HYDROCHLORIDE 50 MG/ML
12.5 INJECTION INTRAMUSCULAR; INTRAVENOUS
Status: DISCONTINUED | OUTPATIENT
Start: 2024-02-15 | End: 2024-02-15

## 2024-02-15 RX ORDER — OXYCODONE AND ACETAMINOPHEN 5; 325 MG/1; MG/1
1 TABLET ORAL EVERY 4 HOURS PRN
Status: DISCONTINUED | OUTPATIENT
Start: 2024-02-16 | End: 2024-02-15

## 2024-02-15 RX ORDER — ACETAMINOPHEN 325 MG/1
650 TABLET ORAL EVERY 6 HOURS
Status: COMPLETED | OUTPATIENT
Start: 2024-02-15 | End: 2024-02-16

## 2024-02-15 RX ORDER — BUPIVACAINE HYDROCHLORIDE 7.5 MG/ML
INJECTION, SOLUTION EPIDURAL; RETROBULBAR
Status: DISCONTINUED | OUTPATIENT
Start: 2024-02-15 | End: 2024-02-15

## 2024-02-15 RX ORDER — OXYTOCIN/RINGER'S LACTATE 30/500 ML
95 PLASTIC BAG, INJECTION (ML) INTRAVENOUS ONCE
Status: DISCONTINUED | OUTPATIENT
Start: 2024-02-15 | End: 2024-02-15

## 2024-02-15 RX ORDER — OXYCODONE HYDROCHLORIDE 5 MG/1
5 TABLET ORAL EVERY 4 HOURS PRN
Status: DISCONTINUED | OUTPATIENT
Start: 2024-02-15 | End: 2024-02-17 | Stop reason: HOSPADM

## 2024-02-15 RX ORDER — MUPIROCIN 20 MG/G
OINTMENT TOPICAL 2 TIMES DAILY
Status: DISCONTINUED | OUTPATIENT
Start: 2024-02-15 | End: 2024-02-15

## 2024-02-15 RX ORDER — OXYTOCIN/RINGER'S LACTATE 30/500 ML
95 PLASTIC BAG, INJECTION (ML) INTRAVENOUS ONCE AS NEEDED
Status: DISCONTINUED | OUTPATIENT
Start: 2024-02-15 | End: 2024-02-17 | Stop reason: HOSPADM

## 2024-02-15 RX ORDER — DIPHENHYDRAMINE HYDROCHLORIDE 50 MG/ML
12.5 INJECTION INTRAMUSCULAR; INTRAVENOUS EVERY 4 HOURS PRN
Status: DISCONTINUED | OUTPATIENT
Start: 2024-02-15 | End: 2024-02-15

## 2024-02-15 RX ORDER — ADHESIVE BANDAGE
30 BANDAGE TOPICAL 2 TIMES DAILY PRN
Status: DISCONTINUED | OUTPATIENT
Start: 2024-02-16 | End: 2024-02-17 | Stop reason: HOSPADM

## 2024-02-15 RX ORDER — BUPIVACAINE HYDROCHLORIDE 2.5 MG/ML
30 INJECTION, SOLUTION EPIDURAL; INFILTRATION; INTRACAUDAL ONCE
Status: DISCONTINUED | OUTPATIENT
Start: 2024-02-15 | End: 2024-02-15

## 2024-02-15 RX ORDER — MORPHINE SULFATE 0.5 MG/ML
INJECTION, SOLUTION EPIDURAL; INTRATHECAL; INTRAVENOUS
Status: DISCONTINUED | OUTPATIENT
Start: 2024-02-15 | End: 2024-02-15

## 2024-02-15 RX ORDER — OXYCODONE AND ACETAMINOPHEN 10; 325 MG/1; MG/1
1 TABLET ORAL EVERY 4 HOURS PRN
Status: DISCONTINUED | OUTPATIENT
Start: 2024-02-16 | End: 2024-02-15

## 2024-02-15 RX ORDER — BISACODYL 10 MG/1
10 SUPPOSITORY RECTAL ONCE AS NEEDED
Status: DISCONTINUED | OUTPATIENT
Start: 2024-02-15 | End: 2024-02-15

## 2024-02-15 RX ORDER — CARBOPROST TROMETHAMINE 250 UG/ML
250 INJECTION, SOLUTION INTRAMUSCULAR
Status: DISCONTINUED | OUTPATIENT
Start: 2024-02-15 | End: 2024-02-17 | Stop reason: HOSPADM

## 2024-02-15 RX ORDER — EPHEDRINE SULFATE 50 MG/ML
INJECTION, SOLUTION INTRAVENOUS
Status: DISCONTINUED | OUTPATIENT
Start: 2024-02-15 | End: 2024-02-15

## 2024-02-15 RX ORDER — DOCUSATE SODIUM 100 MG/1
200 CAPSULE, LIQUID FILLED ORAL 2 TIMES DAILY
Status: DISCONTINUED | OUTPATIENT
Start: 2024-02-15 | End: 2024-02-17 | Stop reason: HOSPADM

## 2024-02-15 RX ORDER — ONDANSETRON HYDROCHLORIDE 2 MG/ML
4 INJECTION, SOLUTION INTRAVENOUS EVERY 6 HOURS PRN
Status: DISCONTINUED | OUTPATIENT
Start: 2024-02-15 | End: 2024-02-17 | Stop reason: HOSPADM

## 2024-02-15 RX ORDER — OXYTOCIN 10 [USP'U]/ML
10 INJECTION, SOLUTION INTRAMUSCULAR; INTRAVENOUS ONCE AS NEEDED
Status: DISCONTINUED | OUTPATIENT
Start: 2024-02-15 | End: 2024-02-15

## 2024-02-15 RX ORDER — OXYTOCIN/RINGER'S LACTATE 30/500 ML
334 PLASTIC BAG, INJECTION (ML) INTRAVENOUS ONCE AS NEEDED
Status: DISCONTINUED | OUTPATIENT
Start: 2024-02-15 | End: 2024-02-15

## 2024-02-15 RX ORDER — CARBOPROST TROMETHAMINE 250 UG/ML
250 INJECTION, SOLUTION INTRAMUSCULAR
Status: DISCONTINUED | OUTPATIENT
Start: 2024-02-15 | End: 2024-02-15

## 2024-02-15 RX ORDER — FAMOTIDINE 10 MG/ML
20 INJECTION INTRAVENOUS
Status: DISCONTINUED | OUTPATIENT
Start: 2024-02-15 | End: 2024-02-15

## 2024-02-15 RX ORDER — MISOPROSTOL 200 UG/1
800 TABLET ORAL ONCE AS NEEDED
Status: DISCONTINUED | OUTPATIENT
Start: 2024-02-15 | End: 2024-02-17 | Stop reason: HOSPADM

## 2024-02-15 RX ORDER — OXYCODONE AND ACETAMINOPHEN 10; 325 MG/1; MG/1
1 TABLET ORAL EVERY 4 HOURS PRN
Status: DISCONTINUED | OUTPATIENT
Start: 2024-02-16 | End: 2024-02-17 | Stop reason: HOSPADM

## 2024-02-15 RX ORDER — PHENYLEPHRINE HYDROCHLORIDE 10 MG/ML
INJECTION INTRAVENOUS
Status: DISCONTINUED | OUTPATIENT
Start: 2024-02-15 | End: 2024-02-15

## 2024-02-15 RX ORDER — METHYLERGONOVINE MALEATE 0.2 MG/ML
200 INJECTION INTRAVENOUS ONCE AS NEEDED
Status: DISCONTINUED | OUTPATIENT
Start: 2024-02-15 | End: 2024-02-15

## 2024-02-15 RX ORDER — METHYLERGONOVINE MALEATE 0.2 MG/ML
200 INJECTION INTRAVENOUS
Status: DISCONTINUED | OUTPATIENT
Start: 2024-02-15 | End: 2024-02-15

## 2024-02-15 RX ORDER — OXYTOCIN/RINGER'S LACTATE 30/500 ML
334 PLASTIC BAG, INJECTION (ML) INTRAVENOUS ONCE AS NEEDED
Status: DISCONTINUED | OUTPATIENT
Start: 2024-02-15 | End: 2024-02-17 | Stop reason: HOSPADM

## 2024-02-15 RX ORDER — IBUPROFEN 400 MG/1
800 TABLET ORAL 3 TIMES DAILY
Status: DISCONTINUED | OUTPATIENT
Start: 2024-02-16 | End: 2024-02-17 | Stop reason: HOSPADM

## 2024-02-15 RX ORDER — NALOXONE HCL 0.4 MG/ML
0.04 VIAL (ML) INJECTION EVERY 5 MIN PRN
Status: DISCONTINUED | OUTPATIENT
Start: 2024-02-15 | End: 2024-02-17 | Stop reason: HOSPADM

## 2024-02-15 RX ORDER — SODIUM CHLORIDE 0.9 % (FLUSH) 0.9 %
10 SYRINGE (ML) INJECTION
Status: DISCONTINUED | OUTPATIENT
Start: 2024-02-15 | End: 2024-02-15

## 2024-02-15 RX ORDER — DIPHENHYDRAMINE HYDROCHLORIDE 50 MG/ML
25 INJECTION INTRAMUSCULAR; INTRAVENOUS EVERY 4 HOURS PRN
Status: DISCONTINUED | OUTPATIENT
Start: 2024-02-15 | End: 2024-02-17 | Stop reason: HOSPADM

## 2024-02-15 RX ORDER — DIPHENHYDRAMINE HCL 25 MG
25 CAPSULE ORAL EVERY 4 HOURS PRN
Status: DISCONTINUED | OUTPATIENT
Start: 2024-02-15 | End: 2024-02-17 | Stop reason: HOSPADM

## 2024-02-15 RX ORDER — MISOPROSTOL 200 UG/1
800 TABLET ORAL
Status: DISCONTINUED | OUTPATIENT
Start: 2024-02-15 | End: 2024-02-15

## 2024-02-15 RX ORDER — ONDANSETRON HYDROCHLORIDE 2 MG/ML
INJECTION, SOLUTION INTRAVENOUS
Status: DISCONTINUED | OUTPATIENT
Start: 2024-02-15 | End: 2024-02-15

## 2024-02-15 RX ORDER — DEXMEDETOMIDINE HYDROCHLORIDE 100 UG/ML
INJECTION, SOLUTION INTRAVENOUS
Status: DISCONTINUED | OUTPATIENT
Start: 2024-02-15 | End: 2024-02-15

## 2024-02-15 RX ORDER — SODIUM CITRATE AND CITRIC ACID MONOHYDRATE 334; 500 MG/5ML; MG/5ML
30 SOLUTION ORAL
Status: DISCONTINUED | OUTPATIENT
Start: 2024-02-15 | End: 2024-02-15

## 2024-02-15 RX ORDER — DIPHENOXYLATE HYDROCHLORIDE AND ATROPINE SULFATE 2.5; .025 MG/1; MG/1
2 TABLET ORAL EVERY 6 HOURS PRN
Status: DISCONTINUED | OUTPATIENT
Start: 2024-02-15 | End: 2024-02-15

## 2024-02-15 RX ORDER — KETOROLAC TROMETHAMINE 30 MG/ML
INJECTION, SOLUTION INTRAMUSCULAR; INTRAVENOUS
Status: DISCONTINUED | OUTPATIENT
Start: 2024-02-15 | End: 2024-02-15

## 2024-02-15 RX ADMIN — DEXTROSE MONOHYDRATE 3 G: 50 INJECTION, SOLUTION INTRAVENOUS at 09:02

## 2024-02-15 RX ADMIN — PHENYLEPHRINE HYDROCHLORIDE 100 MCG: 10 INJECTION INTRAVENOUS at 09:02

## 2024-02-15 RX ADMIN — PHENYLEPHRINE HYDROCHLORIDE 200 MCG: 10 INJECTION INTRAVENOUS at 10:02

## 2024-02-15 RX ADMIN — EPHEDRINE SULFATE 25 MG: 50 INJECTION INTRAVENOUS at 10:02

## 2024-02-15 RX ADMIN — FAMOTIDINE 20 MG: 10 INJECTION, SOLUTION INTRAVENOUS at 08:02

## 2024-02-15 RX ADMIN — ACETAMINOPHEN 650 MG: 325 TABLET ORAL at 11:02

## 2024-02-15 RX ADMIN — Medication 334 ML/HR: at 10:02

## 2024-02-15 RX ADMIN — MUPIROCIN: 20 OINTMENT TOPICAL at 09:02

## 2024-02-15 RX ADMIN — DIPHENHYDRAMINE HYDROCHLORIDE 25 MG: 25 CAPSULE ORAL at 05:02

## 2024-02-15 RX ADMIN — DOCUSATE SODIUM 200 MG: 100 CAPSULE, LIQUID FILLED ORAL at 09:02

## 2024-02-15 RX ADMIN — MUPIROCIN: 20 OINTMENT TOPICAL at 08:02

## 2024-02-15 RX ADMIN — PHENYLEPHRINE HYDROCHLORIDE 100 MCG: 10 INJECTION INTRAVENOUS at 10:02

## 2024-02-15 RX ADMIN — SODIUM CHLORIDE, SODIUM LACTATE, POTASSIUM CHLORIDE, AND CALCIUM CHLORIDE: .6; .31; .03; .02 INJECTION, SOLUTION INTRAVENOUS at 09:02

## 2024-02-15 RX ADMIN — KETOROLAC TROMETHAMINE 30 MG: 30 INJECTION, SOLUTION INTRAMUSCULAR; INTRAVENOUS at 10:02

## 2024-02-15 RX ADMIN — BUPIVACAINE HYDROCHLORIDE 1.5 ML: 7.5 INJECTION, SOLUTION EPIDURAL; RETROBULBAR at 09:02

## 2024-02-15 RX ADMIN — ACETAMINOPHEN 650 MG: 325 TABLET ORAL at 05:02

## 2024-02-15 RX ADMIN — DIPHENHYDRAMINE HYDROCHLORIDE 25 MG: 50 INJECTION INTRAMUSCULAR; INTRAVENOUS at 10:02

## 2024-02-15 RX ADMIN — PHENYLEPHRINE HYDROCHLORIDE 50 MCG: 10 INJECTION INTRAVENOUS at 09:02

## 2024-02-15 RX ADMIN — ONDANSETRON 4 MG: 2 INJECTION, SOLUTION INTRAMUSCULAR; INTRAVENOUS at 09:02

## 2024-02-15 RX ADMIN — MORPHINE SULFATE 0.1 MG: 0.5 INJECTION, SOLUTION EPIDURAL; INTRATHECAL; INTRAVENOUS at 09:02

## 2024-02-15 RX ADMIN — KETOROLAC TROMETHAMINE 30 MG: 30 INJECTION, SOLUTION INTRAMUSCULAR; INTRAVENOUS at 11:02

## 2024-02-15 RX ADMIN — KETOROLAC TROMETHAMINE 30 MG: 30 INJECTION, SOLUTION INTRAMUSCULAR; INTRAVENOUS at 05:02

## 2024-02-15 RX ADMIN — SODIUM CHLORIDE, SODIUM LACTATE, POTASSIUM CHLORIDE, AND CALCIUM CHLORIDE: .6; .31; .03; .02 INJECTION, SOLUTION INTRAVENOUS at 10:02

## 2024-02-15 RX ADMIN — DEXMEDETOMIDINE HCL 5 MCG: 100 INJECTION INTRAVENOUS at 09:02

## 2024-02-15 RX ADMIN — SODIUM CHLORIDE, POTASSIUM CHLORIDE, SODIUM LACTATE AND CALCIUM CHLORIDE 1000 ML: 600; 310; 30; 20 INJECTION, SOLUTION INTRAVENOUS at 08:02

## 2024-02-15 NOTE — DISCHARGE INSTRUCTIONS
"Patient Discharge Instructions for Postpartum Women    Resume Regular Diet  Increase activity gradually, no heavy lifting  Shower  No tampons, douching or sexual intercourse.  Discuss birth control options with your physician.  Wear a support bra  Return to work/school when you've been cleared by a physician    Call your physician if     *Fever of 100.4 or higher  *Persistent nausea/ vomiting  *Incisional drainage  *Heavy vaginal bleeding or large clots (Heavy bleeding is soaking 1 pad in an hour)  *Swelling and pain in arms or legs  *Severe headaches, blurred vision or fainting  *Shortness of breath  *Frequency and burning with urination  *Signs of postpartum depression, discuss these signs with your physician    Call lactation services for questions regarding feeding, nipple and breast care, and general questions about lactation.  They can be reached at [phone]         Understanding Postpartum Depression    You've just had a baby.  You know you should be excited and happy.  But instead you find yourself crying for no reason.  You may have trouble coping with your daily tasks.  You feel sad, tired, and hopeless most of the time.  You may even feel ashamed or guilty.  But what you're going through is not your fault and you can feel better.  Talk to your doctor.  He or she can help.    Depression After Childbirth    You may be weepy and tired right after giving birth.  These feelings are normal.  They're sometimes called the "baby blues."  These blues go away 2-3 weeks.  However, postpartum (meaning "after birth") depression lasts much longer and is more sever than the "baby blues."  It can make you feel sad and hopeless.  You may also fear that your baby will be harmed and worry about being a bad mother.      What is Depression?    Depression is a mood disorder that affects the way you think and feel.  The most common symptom is a feeling of deep sadness.  You may also feel as if you just can't cope with life.  "   Other symptoms include:      * Gaining or loosing weight  * Sleeping too much or too little  * Feeling tired all the time  * Feeling restless  * Fears of harming your baby   * Lack of interest in your baby  * Feeling worthless or guilty  * No longer finding pleasure in things you used to  * Having trouble thinking clearly or making decisions  * Thoughts of hurting yourself or your baby    What Causes Postpartum Depression    The exact causes of postpartum depression isn't known.  It may be due to changes in your hormones during and after childbirth.  You may also be tired from caring for your baby and adjusting to being a mother.  All these factors may make you feel depressed.  In some cases, your genes may also play a role.    Depression Can Be Treated    The good news is that there are many ways to treat postpartum depression.  Talking to your doctor is the first step toward feeling better.    Resources:    * National [place] of Mental Health  -- 132-631-1352    www.nimh.nih.gov    * National [place] on Mental Illness --864.952.5557    Www.daniel.org    * Mental Health [place] -- 786.461.3436     Www.[facility].org    * National Suicide Hotline --440.919.1425 (800-SUICIDE)    6547-3233 [employer]  All rights reserved.  This information is not intended as a substitute for professional medical care.  Always follow up with your healthcare professional's instructions.     Instrucciones Para Elie de Yocasta    Instrucciones a Seguir    Dieta regular  Actividad: Aumentarntar gradualmente  Joseph: Regadera o Geno  Restricciones: No levantar nada pesado  Cuidado Personal: No tampones o duchas vaginales  Actividad Sexual: Naye relaciones sexuales  Planificacion Familiar: Consulta con schmid medico  Cuidado de los Senos: Use un sosten de soporte  Regresar al trabajo/escuela: Cuando schmid medico le indique    Cuando debe llamar al Doctor    *Fiebre de 100.4 o mas alto  *Nausea/Vomito persistente  *Secrecion de la  "incision  *Clare sangrado vaginal o coagulos  *Inflamacion/dolor en los brazos o las piernas  *Severo dolor de fang, vision borrosa or desmayos  *Frequencia/ardor urinario  *Senales de depresion post-parto        La Depresion Post-Parto    Usted acaba de tener un shari'.  A pesar de que sabe que deberia sentirse emocionada y alston, lo que seinte son ganas de llorar sin motivo y tiene dificultades para realizar shabbir tareas diarias.  Usted pasa la mayor parte del tiempo leanne, cansada y desesperanzada, y hasta podria sentirse avergonzada o culpable.  Cande lo que le esta sucediendo no es culpa suya, y puede sentirse mejor.  Hable con schmid medico para que la ayude.     La Depresion Despues del Parto    Mike vez sienta cansancio y ganas de llorar nacrisa despues de melany a river.  Esta etapa melancolica, denominada "baby blues", puede hacerla sentir leanne y desesperanzada, o temerosa de que algo abdirahman le vaya a pasar al shari.  Algunas mujeres hasta llegan a poner en lisa el que puedan ser buenas madres.    Que' es la Depresion?    La depresion es un trastorno del animo que afecta schmid manera de pernsar y de sentir.  El sintoma mas frecuente es un sentimiento de honda tristeza: tambien podria causane la sensacion de que ya no puede sobrellevar la tawanna.    Otros sintomas incluyen:      * Ganar o perder mucho peso  * Dormir en exceso o demasiado poco  * Estar cansada todo el tiempo  * Sentirse inquieta  * Tener miedo de querer herir al shari'  * No tener interes por el shari'  * Sentirse inutil o culpable   * No encontrar placer en las cosas que solia gustarle hacer  * Dificultades para pensar con claridad o christiana decisiones  * Pensar sobre la muerte o el suicidio     Que' Causa la Depresion Postparto?    La causea exacta de la depresion postarto se desconce, aunque posiblemente es el resultado de los cambios hormonales que suceden david y despues del parto.  Tambien puede deberse al cansancio que le causan las exigencias del shari' " y el proceso de adaptacion a schmid maternidad.  Todos estos factores podrian deprimirla.  En algunos casos, existe didier predisposicion genetica a rosendo tipo de depresion.    La depresion puede tratarse    Lo zaragoza es que hay muchas maneras de tratar la depresion postparo.  Emprenda el primer paso para sentirse mejor hablando con schmid medico.     Recursos    * National Institutes of Mental Health-- 530-831-9616     www.[facility].nih.gov    * National [place] on Mental Illness -- 439-195-9694     Www.daniel.org    * Mental Health [place] --  [phone]     Www.[facility].org    * [facility] -- [phone]    7063-5920 [employer]  Todos los derechos reservados.  Esta informacion no pretende sustituir las atencion medica profesional.  Solo schmid medico puede diagnosticar y tratar un problema de misa.          Instrucciones de la lactancia maternal para los bebes recién nacidos:   La Academia [employer] recomienda la leche maternal coco la unica Micaela de nutrición para schmid bebé david los primeros 6 meses de la tawanna, y puede continuar, con la introducción de comida, hasta y despues de 1 ano de edad. Informado sobre astorga consejos: Hay muchos beneficios de amamantar para el misa de la madre y del bebé. Naye los chupones, teteras, o botellas por lo menos por las primeras 4 semanas. Usar los chupones, teteras, o botellas pueden interumpir el proceso de amamantar.   Alimenta en cuanto hay muestras de hambre:  Grace en la boca, hacienda movimientos de succionar.  Ruiditos suavecitos o estirarse  Llorar es un signo de hambre tardío: no esperes a que el bebé llore.  Es de esperarse que haya 8-12 alimentaciones por 24 horas, aunque estas alimentaciones no sigan un horario definido.  Alterna el seno con el que empiezas, o empieza con el seno que se siente más lleno.  Cambia de lado cuando el bebé empiece a tragar más despacio o se desconecte del seno.  Esta sarah si el bebé no come del erica seno en  cada alimentación.  Si el bebé esta muy dormido o somnoliente: el contacto de piel a piel puede animarlo a empezar a comer:  Quítale la camiseta y acuéstalo sobre tu pecho desnudo  Duerme cerca de tu bebé, aun en la casa. Aprende a melany pecho acostada.  En la posición correcta los dos estarán cómodos:  barbilla con seno, pecho con pecho  Labio del bebé abierto hacia afuera para tragar: el labio del bebé debe estar volteado hacia afuera  Boca abierta sarah naz: la boca del bebé cubre la mayoría de la areola (el área oscura del seno)--no nada más el pezón  Fíjate en los signos de que hay transferencia de leche:  Puedes oír al bebé tragar.  No se oyen ruidos más o menos trisha coco clic.  El bebé no demuestra que tiene más hambre después de la alimentación.  El cuerpo del bebé y shabbir mary están relajados por un tiempo corto.  Lo que entra, tiene que salir. Está pendiente de:  Al cuarto día, por lo menos 3 cacas al día.  La blas cambia de maryann a ana o café y luego a amarillo más líquido cuando baja tu leche.  Después del cuarto día, por lo menos 6 pa?ales mojados/pesados al día.  La orina debe ser de color amarillo rebeca cuando baja tu leche.  Debes christiana agua cuando tienes sed y comer alimentos sanos cuando tiene hambre. Roya siesta para descansar lo suficiente.   No tomes medicamentos o alcohol sino con el consejo de schmid doctor. Puedes llamar al [facility] ([phone]), Lunes a [place]ier[place], 8am-5pm, para obtener información sobre los medicamentos y la leche maternal.  La eldon de seguimiento con la pediatra debe ser 2 días después de salir del hospital. El bebé deberia opal ganado el peso de nacimiento cuando tiene 10-14 días de tawanna.

## 2024-02-15 NOTE — H&P
"History and Physical  Obstetrics      SUBJECTIVE:     Erika Vasquez is a 33 y.o.  female with an Estimated Date of Delivery: 24 admitted for  with tubal sterilization.  Her current obstetrical history is significant for gestational DM-poorly controlled.  She reports no complaints. Fetal Movement: normal.    PTA Medications   Medication Sig    blood sugar diagnostic Strp To check BG 5 times daily, to use with insurance preferred meter    blood-glucose meter kit To ch5eck BG 5 times daily, to use with insurance preferred meter    cephALEXin (KEFLEX) 500 MG capsule Take 1 capsule (500 mg total) by mouth 4 (four) times daily. for 10 days    insulin NPH (HUMULIN N NPH U-100 INSULIN) 100 unit/mL injection Inject 36 Units into the skin every morning, THEN 15 Units every evening.    insulin regular (NOVOLIN R REGULAR U100 INSULIN) 100 unit/mL Inj injection Inject 20 Units into the skin every morning for 90 days, THEN 15 Units every evening.    insulin syringe-needle U-100 1 mL 31 gauge x 15/64" Syrg 1 Needle by Misc.(Non-Drug; Combo Route) route 4 (four) times daily.    lancets Misc To check BG 5 times daily, to use with insurance preferred meter    metFORMIN (GLUCOPHAGE) 500 MG tablet Take 2 tablets (1,000 mg total) by mouth 2 (two) times daily with meals. (Patient not taking: Reported on 2024)    prenatal 21-iron fu-folic acid (PRENATAL COMPLETE) 14 mg iron- 400 mcg Tab Take 1 tablet by mouth once daily.    prenatal vit,giorgi 74-iron-folic 27 mg iron- 1 mg Tab Take 1 tablet by mouth once daily. (Patient not taking: Reported on 2022)       Review of patient's allergies indicates:  No Known Allergies     History reviewed. No pertinent past medical history.  Past Surgical History:   Procedure Laterality Date     SECTION       SECTION N/A 2022    Procedure:  SECTION;  Surgeon: Barber Moya MD;  Location: [facility] L&D;  Service: OB/GYN;  Laterality: N/A; "    GALLBLADDER SURGERY       Family History   Problem Relation Age of Onset    Hypertension Father     Diabetes Father      Social History     Tobacco Use    Smoking status: Never    Smokeless tobacco: Never   Substance Use Topics    Alcohol use: Not Currently    Drug use: Never     ROS:  GENERAL: Feeling well overall. Denies fever or chills.   SKIN: Denies rash or lesions.   HEAD: Denies head injury or headache.   NODES: Denies enlarged lymph nodes.   CHEST: Denies chest pain or shortness of breath.   CARDIOVASCULAR: Denies palpitations or left sided chest pain.    ABDOMEN: Denies diarrhea, nausea, vomiting or rectal bleeding.   URINARY: No dysuria, hematuria, or burning on urination.  REPRODUCTIVE: See HPI.   BREASTS: Denies pain, lumps, or nipple discharge.   HEMATOLOGIC: No easy bruisability or excessive bleeding.   MUSCULOSKELETAL: Denies joint pain or swelling.   NEUROLOGIC: Denies syncope or weakness.   PSYCHIATRIC: Denies depression, anxiety or mood swings.         Vital Signs (Most Recent)  Temp: 97.5 °F (36.4 °C) (02/15/24 0910)  Pulse: 68 (02/15/24 0910)  Resp: 19 (02/15/24 0910)  BP: 124/63 (02/15/24 0910)  SpO2: 99 % (02/15/24 0910)    Physical Exam:  General:  alert and normal appearing gravid female   Skin:  Skin color, texture, turgor normal. No rashes or lesions   HEENT:  conjunctivae/corneas clear. PERRL.   Lungs:  clear to auscultation bilaterally   Heart:  regular rate and rhythm, S1, S2 normal, no murmur, click, rub or gallop and regular rate and rhythm       Abdomen:  soft, non-tender; bowel sounds normal   Uterine Size:  size greater than dates   Presentations:  unsure   FHT:  140's  BPM   Pelvis: Exam deferred.   Cervix:    Laboratory:  Lab Results   Component Value Date    WBC 10.67 02/15/2024    HGB 12.5 02/15/2024    HCT 35.1 (L) 02/15/2024    MCV 76 (L) 02/15/2024     02/15/2024      Glucose 101   ASSESSMENT/PLAN:     38w5d gestation.  Not in labor.     Conditions:  Diabetes: Gestational, Prior , and Undesired Fertility    Patient Active Problem List   Diagnosis    Encounter for supervision of normal pregnancy in third trimester    History of  section        Risks, benefits, alternatives and possible complications have been discussed in detail with the patient.  Pre-admission, admission, and post admission procedures and expectations were discussed in detail.  All questions answered, all appropriate consents will be signed at the Hospital. Admission is planned for today.   spinal

## 2024-02-15 NOTE — ANESTHESIA PREPROCEDURE EVALUATION
02/15/2024  Erika Vasquez is a 33 y.o., female  for repeat Csection with Tubal Ligation    Hx of anesthetics in past without complications  NPO>8 hours  No food or drug allergies  Amenable to proceed with scheduled procedure    Patient Active Problem List   Diagnosis    Encounter for supervision of normal pregnancy in third trimester    History of  section       History reviewed. No pertinent past medical history.    ECHO: No results found for this or any previous visit.      There is no height or weight on file to calculate BMI.    Tobacco Use: Low Risk  (2/15/2024)    Patient History     Smoking Tobacco Use: Never     Smokeless Tobacco Use: Never     Passive Exposure: Not on file       Social History     Substance and Sexual Activity   Drug Use Never        Alcohol Use: Not on file       Review of patient's allergies indicates:  No Known Allergies      Airway:  No value filed.         Pre-op Assessment    I have reviewed the Patient Summary Reports.     I have reviewed the Nursing Notes. I have reviewed the NPO Status.   I have reviewed the Medications.     Review of Systems  Anesthesia Hx:  No previous Anesthesia   History of prior surgery of interest to airway management or planning:          Denies Family Hx of Anesthesia complications.    Denies Personal Hx of Anesthesia complications.                    Social:  Non-Smoker       Hematology/Oncology:  Hematology Normal   Oncology Normal                                   EENT/Dental:  EENT/Dental Normal           Cardiovascular:  Exercise tolerance: good                NYHA Classification I                           Pulmonary:  Pulmonary Normal                       Renal/:  Renal/ Normal                 Hepatic/GI:  Hepatic/GI Normal                 Musculoskeletal:  Musculoskeletal Normal                OB/GYN/PEDS:         Planned Repeat     ,  class D - Scheduled (Elective).      Issues with Current Pregnancy  are Gestational Diabetes, Insulin controlled              Neurological:  Neurology Normal                                      Endocrine:  Endocrine Normal          Morbid Obesity / BMI > 40  Dermatological:  Skin Normal    Psych:  Psychiatric Normal                    Physical Exam  General: Well nourished and Cooperative    Airway:  Mallampati: II   Mouth Opening: Normal  Tongue: Normal  Neck ROM: Normal ROM    Dental:  Intact    Chest/Lungs:  Clear to auscultation, Normal Respiratory Rate    Heart:  Rate: Normal  Rhythm: Regular Rhythm      Anesthesia Plan  Type of Anesthesia, risks & benefits discussed:    Anesthesia Type: Spinal, CSE, Gen ETT  Intra-op Monitoring Plan: Standard ASA Monitors  Post Op Pain Control Plan: multimodal analgesia, intrathecal opioid, IV/PO Opioids PRN, peripheral nerve block and epidural analgesia  Informed Consent: Informed consent signed with the Patient and all parties understand the risks and agree with anesthesia plan.  All questions answered. Patient consented to blood products? Yes  ASA Score: 3  Day of Surgery Review of History & Physical: I have interviewed and examined the patient. I have reviewed the patient's H&P dated: There are no significant changes.     Ready For Surgery From Anesthesia Perspective.     .

## 2024-02-15 NOTE — L&D DELIVERY NOTE
[facility] - Labor & Delivery   Section   Operative Note    SUMMARY     Date of Procedure: 2/15/2024     Procedure: Procedure(s) (LRB):   SECTION, WITH TUBAL LIGATION (N/A)    Surgeon(s) and Role:     * Enrrique Simon MD - Primary    Assisting Alyse Tracey    Pre-Operative Diagnosis: 39 weeks gestation of pregnancy [Z3A.39]  Gest DM-poorly controlled, prev c/s x 2, desires BTL  Post-Operative Diagnosis: Post-Op Diagnosis Codes:     * 39 weeks gestation of pregnancy [Z3A.39]    Anesthesia: Spinal/Epidural    Technical Procedures Used: pp BTL via modified jaison method           Description of the Findings of the Procedure: nml anatomy      Complications: No    Blood Loss: * No values recorded between 2/15/2024  9:57 AM and 2/15/2024 10:47 AM *     With patient in supine position, the legs are  and Henry Catheter placed and positioning to supine done.   Abdomen prepped with Chloroprep and 3 minute drying time allowed prior to draping of the abdomen.   Time out taken with OR team members.  Pfannenstiel Incision made through the skin, transverse fascial incision developed, rectus muscles  in the midline and the peritoneum entered.   no adhesions noted.  The lower uterine segment and position of the fetus identified.   Bladder flap taken down through transverse peritoneal incision.    Low Transverse Incision made through well developer lower uterine segment and extended laterally with blunt dissection.   Clear fluid noted.  Infant delivered from vertex presentation.  Cord clamped after one minute and  handed to attending nurse.  Cord blood taken, placenta delivered.  The uterus was exteriorized.  The edges of the uterine incision are grasped with Negro clamps at the angles and the inferior and superior midline edges of the incision.    Closure with running lock 0 vicryl, starting at each angle, tying in the midline.   Observation for bleeding with suture of any bleeding  along the hysterotomy line.   With good hemostasis noted, the anterior pelvis is rinsed with sterile saline.   Right and left adnexa with normal anatomy. Rt and left tubes grasped janet delfino clamp and suture ligated twice with 2-0 chromic and segment of rt and left tubes excised and sent for perm section, hemostasis confirmed     Closure of the abdomen with 2 0 Vicryl running of the peritoneum, fascial closure with #1 vicryl  Skin closure with 4 0 Vicryl subcuticular.  Wound dressed with Aquacell.          Specimens:   Specimen (24h ago, onward)      None            Condition: Good    VTE Risk Mitigation (From admission, onward)           Ordered     IP VTE HIGH RISK PATIENT  Once         02/15/24 1047     Place sequential compression device  Until discontinued         02/15/24 104                    Disposition: PACU - hemodynamically stable.    Attestation: Good         Delivery Information for Dyllan Vasquez    Birth information:  YOB: 2023   Time of birth: 10:10 AM   Sex: male   Head Delivery Date/Time: 2/15/2024 10:10 AM   Delivery type: , Low Transverse   Gestational Age: 38w5d        Delivery Providers    Delivering clinician: Enrrique Simon MD           Measurements    Weight: 4030 g  Weight (lbs): 8 lb 14.2 oz  Length:          Apgars    Living status: Living  Apgar Component Scores:  1 min.:  5 min.:  10 min.:  15 min.:  20 min.:    Skin color:  1  1       Heart rate:  2  2       Reflex irritability:  2  2       Muscle tone:  2  2       Respiratory effort:  2  2       Total:  9  9       Apgars assigned by: SHARMIN VAZQUEZ         Operative Delivery    Forceps attempted?: No  Vacuum extractor attempted?: No         Shoulder Dystocia    Shoulder dystocia present?: No           Presentation    Presentation: Vertex  Position: Middle Occiput Anterior           Interventions/Resuscitation    Method: Bulb Suctioning, Tactile Stimulation       Cord    Vessels: 3  vessels  Complications: None  Delayed Cord Clamping?: No  Cord Clamped Date/Time: 2/15/2024 10:10 AM  Cord Blood Disposition: Sent with Baby  Gases Sent?: Yes  Stem Cell Collection (by MD): No       Placenta    Placenta delivery date/time: 2/15/2024 1012  Placenta removal: Manual removal  Placenta appearance: Intact  Placenta disposition: Donation           Labor Events:       labor: No     Labor Onset Date/Time:         Dilation Complete Date/Time:         Start Pushing Date/Time:         Start Pushing Date/Time:       Rupture Date/Time:            Rupture type:          Fluid Amount:       Fluid Color:                steroids: None     Antibiotics given for GBS: No     Induction: none     Indications for induction:        Augmentation:       Indications for augmentation:       Labor complications: None     Additional complications:          Cervical ripening:                     Delivery:      Episiotomy: None     Indication for Episiotomy:       Perineal Lacerations: None Repaired:      Periurethral Laceration:   Repaired:     Labial Laceration:   Repaired:     Sulcus Laceration:   Repaired:     Vaginal Laceration:   Repaired:     Cervical Laceration:   Repaired:     Repair suture: None     Repair # of packets:       Last Value - EBL - Nursing (mL):       Sum - EBL - Nursing (mL): 0     Last Value - EBL - Anesthesia (mL):      Calculated QBL (mL): 361      Running total QBL (mL): 361      Vaginal Sweep Performed: No     Surgicount Correct: Yes     Vaginal Packing: No Quantity:       Other providers:       Anesthesia    Method: Spinal          Details (if applicable):  Trial of Labor No    Categorization: Repeat    Priority: Routine   Indications for : Prior Uterine Surgery   Incision Type: low transverse     Additional  information:  Forceps:    Vacuum:    Breech:    Observed anomalies    Other (Comments):

## 2024-02-15 NOTE — ANESTHESIA PROCEDURE NOTES
Spinal    Diagnosis: repeat csection  Patient location during procedure: OR  Start time: 2/15/2024 9:36 AM  Timeout: 2/15/2024 9:35 AM  End time: 2/15/2024 9:43 AM    Staffing  Authorizing Provider: Clayton Fair MD  Performing Provider: Augustine Hart Jr., CRNA    Staffing  Performed by: Augustine Hart Jr., CRNA  Authorized by: Clayton Fair MD    Preanesthetic Checklist  Completed: patient identified, IV checked, site marked, risks and benefits discussed, surgical consent, monitors and equipment checked, pre-op evaluation and timeout performed  Spinal Block  Patient position: sitting  Prep: ChloraPrep  Patient monitoring: heart rate, cardiac monitor and continuous pulse ox  Approach: midline  Location: L3-4  Injection technique: single shot  CSF Fluid: clear free-flowing CSF  Needle  Needle type: Joann   Needle gauge: 25 G  Needle length: 3.5 in  Additional Documentation: no paresthesia on injection and negative aspiration for heme  Needle localization: anatomical landmarks  Assessment  Sensory level: T5   Dermatomal levels determined by pinch or prick  Ease of block: easy  Patient's tolerance of the procedure: comfortable throughout block and no complaints

## 2024-02-15 NOTE — LACTATION NOTE
Rounded on couplet. Mother reports infant BR well after delivery. States she plans to do both BR/FF as she has done with previous children.  Discussed benefits of BR/possible risks of FF; size of baby's stomach; adequacy of colostrum; supply/demand. Encouraged more BR & to do so first; discouraged FF if BR effectively. AAP recommendations discussed. Mom will breastfeed frequently & on cue at least 8+ times/24 hrs.  Will monitor for signs of deep latch & adequate fdg; I&O. Instructed to call for any questions/needs. Verbalized understanding.

## 2024-02-15 NOTE — ANESTHESIA POSTPROCEDURE EVALUATION
Anesthesia Post Evaluation    Patient: Erika Vasquez    Procedure(s) Performed: Procedure(s) (LRB):   SECTION, WITH TUBAL LIGATION (N/A)    Final Anesthesia Type: spinal      Patient location during evaluation: labor & delivery  Patient participation: Yes- Able to Participate  Level of consciousness: awake and alert  Post-procedure vital signs: reviewed and stable  Pain management: adequate  Airway patency: patent    PONV status at discharge: No PONV  Anesthetic complications: no      Cardiovascular status: blood pressure returned to baseline  Respiratory status: unassisted and spontaneous ventilation  Hydration status: euvolemic  Follow-up not needed.              Vitals Value Taken Time   BP 90/56 02/15/24 1356   Temp 36.7 °C (98 °F) 02/15/24 1356   Pulse 78 02/15/24 1356   Resp 20 02/15/24 1356   SpO2 97 % 02/15/24 1356         Event Time   Out of Recovery 13:00:00         Pain/Xavi Score: No data recorded

## 2024-02-15 NOTE — TRANSFER OF CARE
"Anesthesia Transfer of Care Note    Patient: Erika Vasquez    Procedure(s) Performed: Procedure(s) (LRB):   SECTION, WITH TUBAL LIGATION (N/A)    Patient location: Labor and Delivery    Anesthesia Type: spinal    Transport from OR: Transported from OR on room air with adequate spontaneous ventilation    Post pain: adequate analgesia    Post assessment: no apparent anesthetic complications    Post vital signs: stable    Level of consciousness: awake    Nausea/Vomiting: no nausea/vomiting    Complications: none    Transfer of care protocol was followed      Last vitals: Visit Vitals  /74   Pulse 91   Temp 36.4 °C (97.5 °F)   Resp 19   Ht 5' 2.99" (1.6 m)   Wt 120 kg (264 lb 8.8 oz)   LMP 2023 (Exact Date)   SpO2 99%   Breastfeeding No   BMI 46.88 kg/m²     "

## 2024-02-16 LAB
BASOPHILS # BLD AUTO: 0.01 K/UL (ref 0–0.2)
BASOPHILS NFR BLD: 0.1 % (ref 0–1.9)
DIFFERENTIAL METHOD BLD: ABNORMAL
EOSINOPHIL # BLD AUTO: 0.1 K/UL (ref 0–0.5)
EOSINOPHIL NFR BLD: 0.7 % (ref 0–8)
ERYTHROCYTE [DISTWIDTH] IN BLOOD BY AUTOMATED COUNT: 15.4 % (ref 11.5–14.5)
HCT VFR BLD AUTO: 30.7 % (ref 37–48.5)
HGB BLD-MCNC: 10.8 G/DL (ref 12–16)
IMM GRANULOCYTES # BLD AUTO: 0.04 K/UL (ref 0–0.04)
IMM GRANULOCYTES NFR BLD AUTO: 0.4 % (ref 0–0.5)
LYMPHOCYTES # BLD AUTO: 0.7 K/UL (ref 1–4.8)
LYMPHOCYTES NFR BLD: 7.1 % (ref 18–48)
MCH RBC QN AUTO: 26.9 PG (ref 27–31)
MCHC RBC AUTO-ENTMCNC: 35.2 G/DL (ref 32–36)
MCV RBC AUTO: 77 FL (ref 82–98)
MONOCYTES # BLD AUTO: 0.6 K/UL (ref 0.3–1)
MONOCYTES NFR BLD: 5.3 % (ref 4–15)
NEUTROPHILS # BLD AUTO: 8.9 K/UL (ref 1.8–7.7)
NEUTROPHILS NFR BLD: 86.4 % (ref 38–73)
NRBC BLD-RTO: 0 /100 WBC
PLATELET # BLD AUTO: 185 K/UL (ref 150–450)
PMV BLD AUTO: 13 FL (ref 9.2–12.9)
POCT GLUCOSE: 126 MG/DL (ref 70–110)
POCT GLUCOSE: 157 MG/DL (ref 70–110)
POCT GLUCOSE: 73 MG/DL (ref 70–110)
POCT GLUCOSE: 89 MG/DL (ref 70–110)
RBC # BLD AUTO: 4.01 M/UL (ref 4–5.4)
RPR SER QL: NORMAL
WBC # BLD AUTO: 10.29 K/UL (ref 3.9–12.7)

## 2024-02-16 PROCEDURE — 36415 COLL VENOUS BLD VENIPUNCTURE: CPT | Performed by: OBSTETRICS & GYNECOLOGY

## 2024-02-16 PROCEDURE — 25000003 PHARM REV CODE 250: Performed by: OBSTETRICS & GYNECOLOGY

## 2024-02-16 PROCEDURE — 11000001 HC ACUTE MED/SURG PRIVATE ROOM

## 2024-02-16 PROCEDURE — 25000003 PHARM REV CODE 250: Performed by: UROLOGY

## 2024-02-16 PROCEDURE — 63600175 PHARM REV CODE 636 W HCPCS: Performed by: UROLOGY

## 2024-02-16 PROCEDURE — 85025 COMPLETE CBC W/AUTO DIFF WBC: CPT | Performed by: OBSTETRICS & GYNECOLOGY

## 2024-02-16 RX ADMIN — ACETAMINOPHEN 650 MG: 325 TABLET ORAL at 06:02

## 2024-02-16 RX ADMIN — OXYCODONE HYDROCHLORIDE 10 MG: 5 TABLET ORAL at 09:02

## 2024-02-16 RX ADMIN — SIMETHICONE 80 MG: 80 TABLET, CHEWABLE ORAL at 09:02

## 2024-02-16 RX ADMIN — OXYCODONE AND ACETAMINOPHEN 1 TABLET: 10; 325 TABLET ORAL at 04:02

## 2024-02-16 RX ADMIN — DOCUSATE SODIUM 200 MG: 100 CAPSULE, LIQUID FILLED ORAL at 09:02

## 2024-02-16 RX ADMIN — OXYCODONE AND ACETAMINOPHEN 1 TABLET: 10; 325 TABLET ORAL at 09:02

## 2024-02-16 RX ADMIN — MUPIROCIN: 20 OINTMENT TOPICAL at 09:02

## 2024-02-16 RX ADMIN — IBUPROFEN 800 MG: 400 TABLET ORAL at 09:02

## 2024-02-16 RX ADMIN — KETOROLAC TROMETHAMINE 30 MG: 30 INJECTION, SOLUTION INTRAMUSCULAR; INTRAVENOUS at 06:02

## 2024-02-16 RX ADMIN — PRENATAL VIT W/ FE FUMARATE-FA TAB 27-0.8 MG 1 TABLET: 27-0.8 TAB at 09:02

## 2024-02-16 RX ADMIN — ACETAMINOPHEN 650 MG: 325 TABLET ORAL at 11:02

## 2024-02-16 RX ADMIN — IBUPROFEN 800 MG: 400 TABLET ORAL at 11:02

## 2024-02-16 NOTE — LACTATION NOTE
Rounded on couplet. Mom reports nipple graspable/somewhat flat. Given nipple shells to help nipples karolina better. Mom reports baby latches well and has been breastfeeding prior to each bottle given. Denies any nipple soreness at this time.Encouraged mom to continue to breastfeed 8/+ in 24 or on demand. Encouraged mom to call this RN if further breastfeeding assistance is needed. Mom verbalized understanding.      [facility] - Mother & Baby  Lactation Note - Mom    SUMMARY     Maternal Assessment    Breast Density: Bilateral:, soft  Nipples: Bilateral:, graspable, flat  Left Nipple Symptoms:  (denies pain)  Right Nipple Symptoms:  (denies pain)      LATCH Score         Breasts WDL    Breast WDL: WDL  Left Nipple Symptoms:  (denies pain)  Right Nipple Symptoms:  (denies pain)    Maternal Infant Feeding    Maternal Preparation: breast care, hand hygiene  Maternal Emotional State: independent, relaxed  Pain with Feeding: no  Comfort Measures Following Feeding: air-drying encouraged  Latch Assistance: no (enc to call for latch assist prn)    Lactation Referrals    Community Referrals: home health care  Home Health Care Lactation Follow-up Date/Time: ths given  Outpatient Lactation Program Lactation Follow-up Date/Time: call lact ctr prn    Lactation Interventions    Breast Care: Breastfeeding: open to air  Breastfeeding Assistance: nipple shell utilized, feeding cue recognition promoted, feeding on demand promoted, support offered  Breast Care: Breastfeeding: open to air  Breastfeeding Assistance: nipple shell utilized, feeding cue recognition promoted, feeding on demand promoted, support offered  Fetal Wellbeing Promotion: intake and output monitored  Breastfeeding Support: diary/feeding log utilized, encouragement provided       Breastfeeding Session         Maternal Information

## 2024-02-16 NOTE — PLAN OF CARE
Note copied from Infant's chart (MRN: [id number])          SOCIAL WORK DISCHARGE PLANNING ASSESSMENT     SW completed discharge planning assessment with pt's mother in mother's room K301 with assistance from  Estuardo [id number]. Pt's mother was easily engaged and education on the role of  was provided. Pt's mother reported all necessities for patient were obtained, including a car seat. Pt's mother reported she has good support from family and friends. Pt's father will provide transportation home following discharge. Pt's mother was provided education on how obtain a breast pump through [facility] and education on how to enroll with WIC. No other needs for community resources were reported and all resources were provided to pt's mother in [place]. Pt's mother was encouraged to call with any questions or concerns. Pt's mother verbalized understanding.      Legal Name: Marc Rebolledo           :  2/15/2023  Address: [address] [place] [address]  Parent's Phone Numbers: pt's mother Erika Vasquez and pt's father Marc Rebolledo [phone]     Pediatrician:  Gunjan Ibanez NP             Patient Active Problem List   Diagnosis     infant of 38 completed weeks of gestation    LGA (large for gestational age) infant    IDM (infant of diabetic mother)      Birth Hospital:[facility]           AICHA: 24     Birth Weight:   4.03 kg (8 lb 14.2 oz)              Birth Length: 50cm                  Gestational Age: 38w5d           Apgars    Living status: Living  Apgar Component Scores:  1 min.:  5 min.:  10 min.:  15 min.:  20 min.:    Skin color:  1  1          Heart rate:  2  2          Reflex irritability:  2  2          Muscle tone:  2  2          Respiratory effort:  2  2          Total:  9  9          Apgars assigned by: SHARMIN VAZQUEZ           24 1258   OB Discharge Planning Assessment   Assessment Type Discharge Planning Assessment   Source of  Information family; utilized  (pt's mother with [employer] Estuardo [id number])   Verified Demographic and Insurance Information Yes   Insurance Medicaid   Medicaid Other (see comments)  ([employer])   Medicaid Insurance Primary   Father's Involvement Fully Involved   Is Father signing the birth certificate Yes   Father's Address [address] [address]   Family Involvement Moderate   Primary Contact Name and Number pt's mother Erika Vasquez [phone] and pt's father Marc Rebolledo [phone]   Received Prenatal Care Yes   Transportation Anticipated family or friend will provide   Receive WIC Benefits Not certified, will apply for     Arrangements Self;Family;Friends   Infant Feeding Plan breastfeeding;formula feeding   Breast Pump Needed yes   Does baby have crib or safe sleep space? Yes   Do you have a car seat? Yes   Has other essential care items? Clothing;Bottles;Diapers   Pediatrician Gunjan Burger NP   Resources/Education Provided Preparing for Your Baby's Discharge Home;Insurance Coverage of Breast Pumps and Supplies;Breast Pumps through [employer] [place] insurance plan;[facility]   DCFS No indications (Indicators for Report)   Discharge Plan A Home with family

## 2024-02-16 NOTE — NURSING
Henry cath removed, tip intact.  Pericare done, pt tolerated well.  Pt instructed to call for assistance prior to ambulating.  Pt verbalized understanding.

## 2024-02-17 ENCOUNTER — LACTATION ENCOUNTER (OUTPATIENT)
Dept: OBSTETRICS AND GYNECOLOGY | Facility: HOSPITAL | Age: 34
End: 2024-02-17

## 2024-02-17 VITALS
TEMPERATURE: 99 F | RESPIRATION RATE: 18 BRPM | HEART RATE: 96 BPM | HEIGHT: 63 IN | BODY MASS INDEX: 46.88 KG/M2 | OXYGEN SATURATION: 97 % | SYSTOLIC BLOOD PRESSURE: 117 MMHG | WEIGHT: 264.56 LBS | DIASTOLIC BLOOD PRESSURE: 54 MMHG

## 2024-02-17 LAB — POCT GLUCOSE: 107 MG/DL (ref 70–110)

## 2024-02-17 PROCEDURE — 25000003 PHARM REV CODE 250: Performed by: OBSTETRICS & GYNECOLOGY

## 2024-02-17 PROCEDURE — 99238 HOSP IP/OBS DSCHRG MGMT 30/<: CPT | Mod: ,,, | Performed by: STUDENT IN AN ORGANIZED HEALTH CARE EDUCATION/TRAINING PROGRAM

## 2024-02-17 RX ORDER — IBUPROFEN 800 MG/1
800 TABLET ORAL 3 TIMES DAILY
Qty: 60 TABLET | Refills: 1 | Status: SHIPPED | OUTPATIENT
Start: 2024-02-17

## 2024-02-17 RX ORDER — OXYCODONE AND ACETAMINOPHEN 5; 325 MG/1; MG/1
1 TABLET ORAL EVERY 4 HOURS PRN
Qty: 20 TABLET | Refills: 0 | Status: SHIPPED | OUTPATIENT
Start: 2024-02-17

## 2024-02-17 RX ADMIN — IBUPROFEN 800 MG: 400 TABLET ORAL at 08:02

## 2024-02-17 RX ADMIN — DOCUSATE SODIUM 200 MG: 100 CAPSULE, LIQUID FILLED ORAL at 08:02

## 2024-02-17 RX ADMIN — PRENATAL VIT W/ FE FUMARATE-FA TAB 27-0.8 MG 1 TABLET: 27-0.8 TAB at 08:02

## 2024-02-17 RX ADMIN — OXYCODONE AND ACETAMINOPHEN 1 TABLET: 10; 325 TABLET ORAL at 06:02

## 2024-02-17 RX ADMIN — MUPIROCIN: 20 OINTMENT TOPICAL at 08:02

## 2024-02-17 NOTE — DISCHARGE SUMMARY
Delivery Discharge Summary  Obstetrics      Primary OB Clinician: Enrrique Simon MD      Admission date: 2/15/2024  Discharge date: 2024    Disposition: To home, self care    Discharge Diagnosis List:      Patient Active Problem List   Diagnosis    Encounter for supervision of normal pregnancy in third trimester    History of  section    38 weeks gestation of pregnancy    Gestational diabetes mellitus (GDM) in third trimester    RLTCS/BTL       Procedure:  RLTCS/BTL    Hospital Course:  Erika Vasquez is a 33 y.o. now , POD #2 who was admitted on 2/15/2024 at 38w5d for RLTCS/BTL. Patient was subsequently admitted to labor and delivery unit with signed consents. The patient underwent an uncomplicated repeat  delivery with BTL. Please see delivery note for further details. Her postpartum course was uncomplicated. On discharge day, patient's pain is controlled with oral pain medications. Pt is tolerating ambulation without SOB or CP, and regular diet without N/V. Reports lochia is mild. Denies any HA, vision changes, F/C, LE swelling. Denies any breast pain/soreness.    Pt in stable condition and ready for discharge. She has been instructed to start and/or continue medications and follow up with her obstetrics provider as listed below.    Pertinent studies:  CBC  Recent Labs   Lab 02/15/24  0731 24  0620   WBC 10.67 10.29   HGB 12.5 10.8*   HCT 35.1* 30.7*   MCV 76* 77*    185          Immunization History   Administered Date(s) Administered    Influenza - Quadrivalent - PF *Preferred* (6 months and older) 2022    Tdap 2022        Delivery:    Episiotomy: None   Lacerations: None   Repair suture: None   Repair # of packets:     Blood loss (ml):       Birth information:  YOB: 2023   Time of birth: 10:10 AM   Sex: male   Delivery type: , Low Transverse   Gestational Age: 38w5d     Measurements    Weight: 4030 g  Weight  "(lbs): 8 lb 14.2 oz  Length:          Delivery Clinician: Delivery Providers    Delivering clinician: Enrrique Simon MD          Additional  information:  Forceps:    Vacuum:    Breech:    Observed anomalies      Living?:     Apgars    Living status: Living  Apgar Component Scores:  1 min.:  5 min.:  10 min.:  15 min.:  20 min.:    Skin color:  1  1       Heart rate:  2  2       Reflex irritability:  2  2       Muscle tone:  2  2       Respiratory effort:  2  2       Total:  9  9       Apgars assigned by: SHARMIN VAZQUEZ         Placenta: Delivered:       appearance    Patient Instructions:   Current Discharge Medication List        START taking these medications    Details   ibuprofen (ADVIL,MOTRIN) 800 MG tablet Take 1 tablet (800 mg total) by mouth 3 (three) times daily.  Qty: 60 tablet, Refills: 1      oxyCODONE-acetaminophen (PERCOCET) 5-325 mg per tablet Take 1 tablet by mouth every 4 (four) hours as needed for Pain.  Qty: 20 tablet, Refills: 0    Comments: Quantity prescribed more than 7 day supply? No           STOP taking these medications       blood sugar diagnostic Strp Comments:   Reason for Stopping:         blood-glucose meter kit Comments:   Reason for Stopping:         cephALEXin (KEFLEX) 500 MG capsule Comments:   Reason for Stopping:         insulin NPH (HUMULIN N NPH U-100 INSULIN) 100 unit/mL injection Comments:   Reason for Stopping:         insulin regular (NOVOLIN R REGULAR U100 INSULIN) 100 unit/mL Inj injection Comments:   Reason for Stopping:         insulin syringe-needle U-100 1 mL 31 gauge x 15/64" Syrg Comments:   Reason for Stopping:         lancets Misc Comments:   Reason for Stopping:         metFORMIN (GLUCOPHAGE) 500 MG tablet Comments:   Reason for Stopping:         prenatal 21-iron fu-folic acid (PRENATAL COMPLETE) 14 mg iron- 400 mcg Tab Comments:   Reason for Stopping:         prenatal vit,giorgi 74-iron-folic 27 mg iron- 1 mg Tab Comments:   Reason for Stopping:         "       Discharge Procedure Orders   BREAST PUMP FOR HOME USE     Order Specific Question Answer Comments   Type of pump: Electric    Weight: 120 kg (264 lb 8.8 oz)    Length of need (1-99 months): 99      Pelvic Rest   Order Comments: Pelvic Rest - Nothing in the Vagina for 6 weeks.     Notify your health care provider if you experience any of the following:   Order Comments: Vaginal Bleeding greater than a pad per hour.     Notify your health care provider if you experience any of the following:  increased confusion or weakness     Notify your health care provider if you experience any of the following:  persistent dizziness, light-headedness, or visual disturbances     Notify your health care provider if you experience any of the following:  worsening rash     Notify your health care provider if you experience any of the following:  severe persistent headache     Notify your health care provider if you experience any of the following:  difficulty breathing or increased cough     Notify your health care provider if you experience any of the following:  redness, tenderness, or signs of infection (pain, swelling, redness, odor or green/yellow discharge around incision site)     Notify your health care provider if you experience any of the following:  severe uncontrolled pain     Notify your health care provider if you experience any of the following:  persistent nausea and vomiting or diarrhea     Notify your health care provider if you experience any of the following:  temperature >100.4     Activity as tolerated        Follow-up Information       Enrrique Simon MD Follow up in 1 week(s).    Specialty: Obstetrics and Gynecology  Why: For Post-Op Visit  Contact information:  [address]  [address] [place] [id number]  [phone]                              Ivelisse Hayes M.D.  OB/GYN  [facility]

## 2024-02-17 NOTE — PROGRESS NOTES
POSTPARTUM PROGRESS NOTE     Erika RivasHodan is a 33 y.o. female POD #2 status post Repeat  section w/BTL at 38w5d in a pregnancy complicated by limited prenatal care, prior  x 2, poorly controlled gestational diabetes and unwanted fertility. Patient is doing well this morning. She denies nausea, vomiting, fever or chills.  Patient reports moderate abdominal pain that is adequately relieved by oral pain medications. Lochia is mild to moderate  and decreasing. Patient is voiding without difficulty and ambulating with no difficulty. She has passed flatus, and has not had BM.  Patient does plan to breast feed. S/P BTL for contraception.      Objective:       Temp:  [98.1 °F (36.7 °C)-98.7 °F (37.1 °C)] 98.2 °F (36.8 °C)  Pulse:  [73-82] 73  Resp:  [18] 18  SpO2:  [97 %-99 %] 97 %  BP: (105-117)/(67-71) 105/67    General:   alert, appears stated age, and cooperative   Lungs:   Non-labored breathing   Heart:   regular rate and rhythm   Abdomen:  soft, non-tender; bowel sounds normal; no masses,  no organomegaly   Uterus:  firm located at the umblicus.    Incision: Bandage in place, clean, dry and intact   Extremities: no pedal edema noted     Lab Review  Recent Results (from the past 4 hour(s))   POCT glucose    Collection Time: 24  6:16 AM   Result Value Ref Range    POCT Glucose 107 70 - 110 mg/dL       I/O    Intake/Output Summary (Last 24 hours) at 2024 0719  Last data filed at 2024 0750  Gross per 24 hour   Intake --   Output 400 ml   Net -400 ml        Assessment:     Patient Active Problem List   Diagnosis    Encounter for supervision of normal pregnancy in third trimester    History of  section    38 weeks gestation of pregnancy    Gestational diabetes mellitus (GDM) in third trimester        Plan:   1. Postpartum care:  - Patient doing well. Continue routine management and advances.  - Continue PO pain meds. Pain well controlled.  - Heme: H/H - 12.5/35.1 >  10.8/30.7  - Encourage ambulation  - Contraception - S/P BTL  - Lactation consultant following    2. GDM  - Trending POCT Glucoses  - Will need 2 HR GTT at 6 weeks PP    Dispo: As patient meets milestones, will plan to discharge today.    Ivelisse Hayes

## 2024-02-17 NOTE — PLAN OF CARE
Discharge instructions given to patient verbally and in writing with [facility], [place]  Quinten #[id number] used at the bedside. Verbalized understanding. Received Mother-Baby care guide during hospital stay. Prescriptions given with explanation for use. Verbalized understanding. CDC vaccine information statement given and risk/benifits of vaccine discussed. Tdap up to date. States she feels comfortable taking care of baby and has demonstrated ability to care for  and herself. Says she will have assistance when she returns home.

## 2024-02-17 NOTE — PLAN OF CARE
VSS. Pt voiding without difficulty.  Ambulating well.  Tolerating regular diet.  Reports pain relieved with ordered pain meds administered.  Encouraged to feed infant 8 or more times in 24 hour period and on demand feedings.  Verbalized understanding.

## 2024-02-17 NOTE — LACTATION NOTE
This note was copied from a baby's chart.  Couplet discharged to home. No lactation services requested prior to discharge. No requests/needs/questions verbalized by patient or RN.

## 2024-02-19 LAB
FINAL PATHOLOGIC DIAGNOSIS: NORMAL
GROSS: NORMAL
Lab: NORMAL

## 2024-02-22 ENCOUNTER — TELEPHONE (OUTPATIENT)
Dept: OBSTETRICS AND GYNECOLOGY | Facility: CLINIC | Age: 34
End: 2024-02-22
Payer: MEDICAID

## 2024-02-22 NOTE — TELEPHONE ENCOUNTER
Spoke with pt to get her scheduled for bandage removal for feb 26 at 8;30am.     No further questions or concerns       ----- Message from Radha Ragsdale RN sent at 2/22/2024 10:34 AM CST -----  Hey girl,    Call her for Dr. Denise    Thank you      ----- Message -----  From: Ivelisse Hayes MD  Sent: 2/17/2024   7:31 AM CST  To: [facility] Staff    7-10 bandage removal appointment needed.

## 2024-02-26 ENCOUNTER — CLINICAL SUPPORT (OUTPATIENT)
Dept: OBSTETRICS AND GYNECOLOGY | Facility: CLINIC | Age: 34
End: 2024-02-26
Payer: MEDICAID

## 2024-02-26 VITALS — SYSTOLIC BLOOD PRESSURE: 103 MMHG | DIASTOLIC BLOOD PRESSURE: 70 MMHG

## 2024-02-26 DIAGNOSIS — Z98.891 HISTORY OF CESAREAN SECTION: Primary | ICD-10-CM

## 2024-02-26 NOTE — PROGRESS NOTES
Patient here for removal of bandage.  done on 02/15/2024. Bandage removed using [employer]; cleansed and steri strips applied. No complaints of pain; no ss of infection noted. Patient has appointment with Dr. Simon tomorrow. Instructed patient on ss of infection to monitor for. All questions answered and patient verbalized understanding.

## 2025-04-20 NOTE — NURSING
CRISTEL . Discharge teaching given from bilingual RN. Verbalized understading. No complaints at this time.     Patient advised to Follow Labor Precautions:  Call MD or return to Labor and Delivery if...     Labor (after 36 weeks)  - Painful contractions every 5 minutes for 2 hours that do not go away with 2 bottles of water, 2 tylenol, and rest.       Labor (before 36 weeks)  - More than 4 contractions in 1 hour   -First try 2 bottles of water, rest, and 2 tylenol.... If the contractions do not go away call doctors office or after hours clinic first and/or come to hospital for evaluation.       Water Breaks  - Gush or leaking of fluid from vagina, or if unsure.      Vaginal bleeding  - Bright red bleeding like a period, soaking a pad in 1 hour.     Decreased or No fetal movement  - You should feel 10 movements within two hours.  -If you are not feeling the baby move.... Drink a glass of orange juice or apple juice  - If you DO NOT feel 10 movements within two hours, please  call the MD or come to the hospital.     Unable to keep fluids or food down for more than 24 hours     Blurred vision, spots before your eyes, dizziness, headache that does not get better with Tylenol (Acetaminophen), bad swelling, chest pain, or trouble breathing.     Drink 10-12 glasses of water daily  Take medications as prescribed  Patient voiced understanding of the above D/C instructions.  Patient ambulated off of the unit without difficulty.   Vaccine status unknown

## (undated) DEVICE — STAPLER SKIN PROXIMATE WIDE

## (undated) DEVICE — DRESSING AQUACEL AG 3.5X10IN